# Patient Record
Sex: FEMALE | Race: OTHER | NOT HISPANIC OR LATINO | URBAN - METROPOLITAN AREA
[De-identification: names, ages, dates, MRNs, and addresses within clinical notes are randomized per-mention and may not be internally consistent; named-entity substitution may affect disease eponyms.]

---

## 2019-01-20 ENCOUNTER — INPATIENT (INPATIENT)
Facility: HOSPITAL | Age: 54
LOS: 9 days | Discharge: HOME | DRG: 216 | End: 2019-01-30
Attending: STUDENT IN AN ORGANIZED HEALTH CARE EDUCATION/TRAINING PROGRAM | Admitting: INTERNAL MEDICINE
Payer: MEDICAID

## 2019-01-20 VITALS
OXYGEN SATURATION: 99 % | WEIGHT: 179.9 LBS | SYSTOLIC BLOOD PRESSURE: 167 MMHG | RESPIRATION RATE: 20 BRPM | TEMPERATURE: 98 F | HEART RATE: 75 BPM | DIASTOLIC BLOOD PRESSURE: 97 MMHG

## 2019-01-20 DIAGNOSIS — I48.91 UNSPECIFIED ATRIAL FIBRILLATION: ICD-10-CM

## 2019-01-20 LAB
ALBUMIN SERPL ELPH-MCNC: 4.5 G/DL — SIGNIFICANT CHANGE UP (ref 3.3–5)
ALP SERPL-CCNC: 108 U/L — SIGNIFICANT CHANGE UP (ref 40–120)
ALT FLD-CCNC: 20 U/L — SIGNIFICANT CHANGE UP (ref 10–45)
ANION GAP SERPL CALC-SCNC: 13 MMOL/L — SIGNIFICANT CHANGE UP (ref 5–17)
AST SERPL-CCNC: 26 U/L — SIGNIFICANT CHANGE UP (ref 10–40)
BASOPHILS # BLD AUTO: 0.1 K/UL — SIGNIFICANT CHANGE UP (ref 0–0.2)
BASOPHILS NFR BLD AUTO: 0.8 % — SIGNIFICANT CHANGE UP (ref 0–2)
BILIRUB SERPL-MCNC: 0.8 MG/DL — SIGNIFICANT CHANGE UP (ref 0.2–1.2)
BUN SERPL-MCNC: 21 MG/DL — SIGNIFICANT CHANGE UP (ref 7–23)
CALCIUM SERPL-MCNC: 10.4 MG/DL — SIGNIFICANT CHANGE UP (ref 8.4–10.5)
CHLORIDE SERPL-SCNC: 100 MMOL/L — SIGNIFICANT CHANGE UP (ref 96–108)
CO2 SERPL-SCNC: 28 MMOL/L — SIGNIFICANT CHANGE UP (ref 22–31)
CREAT SERPL-MCNC: 0.87 MG/DL — SIGNIFICANT CHANGE UP (ref 0.5–1.3)
D DIMER BLD IA.RAPID-MCNC: <150 NG/ML DDU — SIGNIFICANT CHANGE UP
EOSINOPHIL # BLD AUTO: 0.3 K/UL — SIGNIFICANT CHANGE UP (ref 0–0.5)
EOSINOPHIL NFR BLD AUTO: 3.3 % — SIGNIFICANT CHANGE UP (ref 0–6)
GLUCOSE SERPL-MCNC: 124 MG/DL — HIGH (ref 70–99)
HCT VFR BLD CALC: 43.5 % — SIGNIFICANT CHANGE UP (ref 34.5–45)
HGB BLD-MCNC: 14.8 G/DL — SIGNIFICANT CHANGE UP (ref 11.5–15.5)
LYMPHOCYTES # BLD AUTO: 2.2 K/UL — SIGNIFICANT CHANGE UP (ref 1–3.3)
LYMPHOCYTES # BLD AUTO: 28.9 % — SIGNIFICANT CHANGE UP (ref 13–44)
MCHC RBC-ENTMCNC: 30.6 PG — SIGNIFICANT CHANGE UP (ref 27–34)
MCHC RBC-ENTMCNC: 33.9 GM/DL — SIGNIFICANT CHANGE UP (ref 32–36)
MCV RBC AUTO: 90.3 FL — SIGNIFICANT CHANGE UP (ref 80–100)
MONOCYTES # BLD AUTO: 0.6 K/UL — SIGNIFICANT CHANGE UP (ref 0–0.9)
MONOCYTES NFR BLD AUTO: 7.5 % — SIGNIFICANT CHANGE UP (ref 2–14)
NEUTROPHILS # BLD AUTO: 4.5 K/UL — SIGNIFICANT CHANGE UP (ref 1.8–7.4)
NEUTROPHILS NFR BLD AUTO: 59.4 % — SIGNIFICANT CHANGE UP (ref 43–77)
PLATELET # BLD AUTO: 266 K/UL — SIGNIFICANT CHANGE UP (ref 150–400)
POTASSIUM SERPL-MCNC: 3 MMOL/L — LOW (ref 3.5–5.3)
POTASSIUM SERPL-SCNC: 3 MMOL/L — LOW (ref 3.5–5.3)
PROT SERPL-MCNC: 8.6 G/DL — HIGH (ref 6–8.3)
RBC # BLD: 4.82 M/UL — SIGNIFICANT CHANGE UP (ref 3.8–5.2)
RBC # FLD: 13.8 % — SIGNIFICANT CHANGE UP (ref 10.3–14.5)
SODIUM SERPL-SCNC: 141 MMOL/L — SIGNIFICANT CHANGE UP (ref 135–145)
TROPONIN T, HIGH SENSITIVITY RESULT: 9 NG/L — SIGNIFICANT CHANGE UP (ref 0–51)
WBC # BLD: 7.6 K/UL — SIGNIFICANT CHANGE UP (ref 3.8–10.5)
WBC # FLD AUTO: 7.6 K/UL — SIGNIFICANT CHANGE UP (ref 3.8–10.5)

## 2019-01-20 PROCEDURE — 71046 X-RAY EXAM CHEST 2 VIEWS: CPT | Mod: 26

## 2019-01-20 PROCEDURE — 71250 CT THORAX DX C-: CPT | Mod: 26

## 2019-01-20 PROCEDURE — 93010 ELECTROCARDIOGRAM REPORT: CPT

## 2019-01-20 PROCEDURE — 99284 EMERGENCY DEPT VISIT MOD MDM: CPT | Mod: 25

## 2019-01-20 RX ORDER — CARVEDILOL PHOSPHATE 80 MG/1
3.12 CAPSULE, EXTENDED RELEASE ORAL EVERY 12 HOURS
Qty: 0 | Refills: 0 | Status: DISCONTINUED | OUTPATIENT
Start: 2019-01-20 | End: 2019-01-25

## 2019-01-20 RX ORDER — ISOSORBIDE MONONITRATE 60 MG/1
30 TABLET, EXTENDED RELEASE ORAL DAILY
Qty: 0 | Refills: 0 | Status: DISCONTINUED | OUTPATIENT
Start: 2019-01-20 | End: 2019-01-25

## 2019-01-20 RX ORDER — FAMOTIDINE 10 MG/ML
20 INJECTION INTRAVENOUS ONCE
Qty: 0 | Refills: 0 | Status: COMPLETED | OUTPATIENT
Start: 2019-01-20 | End: 2019-01-20

## 2019-01-20 RX ORDER — INFLUENZA VIRUS VACCINE 15; 15; 15; 15 UG/.5ML; UG/.5ML; UG/.5ML; UG/.5ML
0.5 SUSPENSION INTRAMUSCULAR ONCE
Qty: 0 | Refills: 0 | Status: COMPLETED | OUTPATIENT
Start: 2019-01-20 | End: 2019-01-20

## 2019-01-20 RX ORDER — POTASSIUM CHLORIDE 20 MEQ
40 PACKET (EA) ORAL ONCE
Qty: 0 | Refills: 0 | Status: COMPLETED | OUTPATIENT
Start: 2019-01-20 | End: 2019-01-20

## 2019-01-20 RX ORDER — ASPIRIN/CALCIUM CARB/MAGNESIUM 324 MG
324 TABLET ORAL ONCE
Qty: 0 | Refills: 0 | Status: COMPLETED | OUTPATIENT
Start: 2019-01-20 | End: 2019-01-20

## 2019-01-20 RX ORDER — LOSARTAN POTASSIUM 100 MG/1
25 TABLET, FILM COATED ORAL
Qty: 0 | Refills: 0 | Status: DISCONTINUED | OUTPATIENT
Start: 2019-01-20 | End: 2019-01-24

## 2019-01-20 RX ADMIN — ISOSORBIDE MONONITRATE 30 MILLIGRAM(S): 60 TABLET, EXTENDED RELEASE ORAL at 17:43

## 2019-01-20 RX ADMIN — Medication 30 MILLILITER(S): at 11:04

## 2019-01-20 RX ADMIN — Medication 324 MILLIGRAM(S): at 11:04

## 2019-01-20 RX ADMIN — Medication 0.5 MILLIGRAM(S): at 22:22

## 2019-01-20 RX ADMIN — LOSARTAN POTASSIUM 25 MILLIGRAM(S): 100 TABLET, FILM COATED ORAL at 17:43

## 2019-01-20 RX ADMIN — Medication 100 MILLIGRAM(S): at 20:23

## 2019-01-20 RX ADMIN — CARVEDILOL PHOSPHATE 3.12 MILLIGRAM(S): 80 CAPSULE, EXTENDED RELEASE ORAL at 22:22

## 2019-01-20 RX ADMIN — FAMOTIDINE 20 MILLIGRAM(S): 10 INJECTION INTRAVENOUS at 11:04

## 2019-01-20 RX ADMIN — Medication 40 MILLIEQUIVALENT(S): at 12:24

## 2019-01-20 NOTE — H&P ADULT - NSHPLABSRESULTS_GEN_ALL_CORE
LABS:                        14.8   7.6   )-----------( 266      ( 20 Jan 2019 11:12 )             43.5     01-20    141  |  100  |  21  ----------------------------<  124<H>  3.0<L>   |  28  |  0.87    Ca    10.4      20 Jan 2019 11:12    TPro  8.6<H>  /  Alb  4.5  /  TBili  0.8  /  DBili  x   /  AST  26  /  ALT  20  /  AlkPhos  108  01-20    PT/INR - ( 20 Jan 2019 11:12 )   PT: 14.8 sec;   INR: 1.28 ratio         PTT - ( 20 Jan 2019 11:12 )  PTT:37.6 sec          RADIOLOGY & ADDITIONAL TESTS:

## 2019-01-20 NOTE — ED ADULT NURSE NOTE - CHPI ED NUR SYMPTOMS NEG
no syncope/no vomiting/no diaphoresis/no chills/no fever/no nausea/no back pain/no congestion/no dizziness

## 2019-01-20 NOTE — ED PROVIDER NOTE - MEDICAL DECISION MAKING DETAILS
53 Guayanese femal, hx htn, afib not on ac p/w chest pain. Likely viral etiology but could be atypical ACS vs PE given risk factors. EKG notable for afib without ischemic changes. Will get ddimer/trop, labs, analgesia, cxr, reassess. Likely a/m to cdu for stress/cta coronary pending labs. 53 Bahamian female, hx htn, afib not on ac p/w chest pain. Likely viral etiology but could be atypical ACS vs PE given risk factors. EKG notable for afib without ischemic changes. Will get ddimer/trop, labs, analgesia, cxr, CTA if positive ddimers , reassess. Likely a/m to cdu for stress/cta coronary pending labs.ZR 53 Azerbaijani female, hx htn, afib not on ac p/w chest pain. Likely viral etiology but could be atypical ACS vs PE given risk factors. EKG notable for afib without ischemic changes. Will get ddimer/trop, labs, analgesia, cxr, CTA if positive ddimers , reassess. Likely a/m to cdu for stress/cta coronary pending lab ZR

## 2019-01-20 NOTE — ED ADULT NURSE REASSESSMENT NOTE - NS ED NURSE REASSESS COMMENT FT1
Pt received from SUDHAKAR Beckman in [facility]   PT Aox3 breathing even unlabored spontaneously, afib 80s on cardiac monitor, reporting 8/10 epigastric CP at this time. Pt received  at 1104. Will reassess pain. Family at bedside. Pt received from SUDHAKAR Beckman in [facility]   PT Aox3 breathing even unlabored spontaneously, afib 70s on cardiac monitor, reporting 8/10 epigastric CP at this time. Pt received  at 1104. Will reassess pain. Family at bedside.

## 2019-01-20 NOTE — H&P ADULT - ASSESSMENT
53F Ghanaian hx afib not on AC, htn p/w chest pain. Patient reports midsternal burning chest pain started a few days ago, constant, gradually worsening worse with cough/deep breath and endorses slight shortness of breath. Radiates to R shoulder. Non exertional/non positional. Denies leg swelling, but notes that she returned on flight from [facility] yesterday (6hr flight). Also notes recent fevers/cough began approximately 1-2 weeks ago. No history of clots, no recent procedures/hospitalizations. 53F Sudanese hx afib not on AC, htn p/w chest pain. Patient reports midsternal burning chest pain started a few days ago, constant, gradually worsening worse with cough/deep breath and endorses slight shortness of breath. Radiates to R shoulder. Non exertional/non positional. Denies leg swelling, but notes that she returned on flight from [facility] yesterday (6hr flight). Also notes recent fevers/cough began approximately 1-2 weeks ago. No history of clots, no recent procedures/hospitalizations.    chest pain  - admit to tele  - HS trop neg x 2  - 2 d echo  - cardio consult    liver cirrhosis on CT  - hepatology consult  - liver sono    Afib /  HTN  - cw coreg  - ? not on a/c

## 2019-01-20 NOTE — ED PROVIDER NOTE - OBJECTIVE STATEMENT
53F Kosovan hx afib not on AC, htn p/w chest pain. Patient reports midsternal burning chest pain started a few days ago, constant, gradually worsening worse with cough/deep breath and endorses slight shortness of breath. Radiates to R shoulder. Non exertional/non positional. Denies leg swelling, but notes that she returned on flight from [facility] yesterday (6hr flight). Also notes recent fevers/cough began approximatly 1-2 weeks ago. No history of clots, no recent procedures/hospitalizations. 53F Georgian hx afib not on AC, htn p/w chest pain. Patient reports midsternal burning chest pain started a few days ago, constant, gradually worsening worse with cough/deep breath and endorses slight shortness of breath. Radiates to R shoulder. Non exertional/non positional. Denies leg swelling, but notes that she returned on flight from [facility] yesterday (6hr flight). Also notes recent fevers/cough began approximately 1-2 weeks ago. No history of clots, no recent procedures/hospitalizations.

## 2019-01-20 NOTE — H&P ADULT - HISTORY OF PRESENT ILLNESS
53F American hx afib not on AC, htn p/w chest pain. Patient reports midsternal burning chest pain started a few days ago, constant, gradually worsening worse with cough/deep breath and endorses slight shortness of breath. Radiates to R shoulder. Non exertional/non positional. Denies leg swelling, but notes that she returned on flight from [facility] yesterday (6hr flight). Also notes recent fevers/cough began approximately 1-2 weeks ago. No history of clots, no recent procedures/hospitalizations.

## 2019-01-20 NOTE — ED PROVIDER NOTE - NEUROLOGICAL, MLM
Alert and oriented, no focal deficits, no motor or sensory deficits. cn2-12 grossly intact, normal speech and tone

## 2019-01-21 LAB
ANION GAP SERPL CALC-SCNC: 12 MMOL/L — SIGNIFICANT CHANGE UP (ref 5–17)
BUN SERPL-MCNC: 18 MG/DL — SIGNIFICANT CHANGE UP (ref 7–23)
CALCIUM SERPL-MCNC: 9.6 MG/DL — SIGNIFICANT CHANGE UP (ref 8.4–10.5)
CHLORIDE SERPL-SCNC: 105 MMOL/L — SIGNIFICANT CHANGE UP (ref 96–108)
CO2 SERPL-SCNC: 26 MMOL/L — SIGNIFICANT CHANGE UP (ref 22–31)
CREAT SERPL-MCNC: 0.71 MG/DL — SIGNIFICANT CHANGE UP (ref 0.5–1.3)
FOLATE SERPL-MCNC: 12.7 NG/ML — SIGNIFICANT CHANGE UP
GLUCOSE SERPL-MCNC: 100 MG/DL — HIGH (ref 70–99)
HCT VFR BLD CALC: 40.6 % — SIGNIFICANT CHANGE UP (ref 34.5–45)
HGB BLD-MCNC: 13.3 G/DL — SIGNIFICANT CHANGE UP (ref 11.5–15.5)
MAGNESIUM SERPL-MCNC: 2 MG/DL — SIGNIFICANT CHANGE UP (ref 1.6–2.6)
MCHC RBC-ENTMCNC: 29.4 PG — SIGNIFICANT CHANGE UP (ref 27–34)
MCHC RBC-ENTMCNC: 32.8 GM/DL — SIGNIFICANT CHANGE UP (ref 32–36)
MCV RBC AUTO: 89.8 FL — SIGNIFICANT CHANGE UP (ref 80–100)
PHOSPHATE SERPL-MCNC: 3.6 MG/DL — SIGNIFICANT CHANGE UP (ref 2.5–4.5)
PLATELET # BLD AUTO: 252 K/UL — SIGNIFICANT CHANGE UP (ref 150–400)
POTASSIUM SERPL-MCNC: 3.8 MMOL/L — SIGNIFICANT CHANGE UP (ref 3.5–5.3)
POTASSIUM SERPL-SCNC: 3.8 MMOL/L — SIGNIFICANT CHANGE UP (ref 3.5–5.3)
RBC # BLD: 4.52 M/UL — SIGNIFICANT CHANGE UP (ref 3.8–5.2)
RBC # FLD: 14.9 % — HIGH (ref 10.3–14.5)
SODIUM SERPL-SCNC: 143 MMOL/L — SIGNIFICANT CHANGE UP (ref 135–145)
TROPONIN T, HIGH SENSITIVITY RESULT: 8 NG/L — SIGNIFICANT CHANGE UP (ref 0–51)
TSH SERPL-MCNC: 2.85 UIU/ML — SIGNIFICANT CHANGE UP (ref 0.27–4.2)
VIT B12 SERPL-MCNC: 312 PG/ML — SIGNIFICANT CHANGE UP (ref 232–1245)
WBC # BLD: 6.7 K/UL — SIGNIFICANT CHANGE UP (ref 3.8–10.5)
WBC # FLD AUTO: 6.7 K/UL — SIGNIFICANT CHANGE UP (ref 3.8–10.5)

## 2019-01-21 PROCEDURE — 99255 IP/OBS CONSLTJ NEW/EST HI 80: CPT | Mod: GC

## 2019-01-21 RX ORDER — HEPARIN SODIUM 5000 [USP'U]/ML
6500 INJECTION INTRAVENOUS; SUBCUTANEOUS EVERY 6 HOURS
Qty: 0 | Refills: 0 | Status: DISCONTINUED | OUTPATIENT
Start: 2019-01-21 | End: 2019-01-25

## 2019-01-21 RX ORDER — HEPARIN SODIUM 5000 [USP'U]/ML
INJECTION INTRAVENOUS; SUBCUTANEOUS
Qty: 25000 | Refills: 0 | Status: DISCONTINUED | OUTPATIENT
Start: 2019-01-21 | End: 2019-01-23

## 2019-01-21 RX ORDER — PREGABALIN 225 MG/1
1000 CAPSULE ORAL DAILY
Qty: 0 | Refills: 0 | Status: COMPLETED | OUTPATIENT
Start: 2019-01-21 | End: 2019-01-23

## 2019-01-21 RX ORDER — HEPARIN SODIUM 5000 [USP'U]/ML
3000 INJECTION INTRAVENOUS; SUBCUTANEOUS EVERY 6 HOURS
Qty: 0 | Refills: 0 | Status: DISCONTINUED | OUTPATIENT
Start: 2019-01-21 | End: 2019-01-25

## 2019-01-21 RX ORDER — HEPARIN SODIUM 5000 [USP'U]/ML
6500 INJECTION INTRAVENOUS; SUBCUTANEOUS ONCE
Qty: 0 | Refills: 0 | Status: COMPLETED | OUTPATIENT
Start: 2019-01-21 | End: 2019-01-21

## 2019-01-21 RX ADMIN — Medication 0.5 MILLIGRAM(S): at 22:22

## 2019-01-21 RX ADMIN — HEPARIN SODIUM 1500 UNIT(S)/HR: 5000 INJECTION INTRAVENOUS; SUBCUTANEOUS at 18:28

## 2019-01-21 RX ADMIN — LOSARTAN POTASSIUM 25 MILLIGRAM(S): 100 TABLET, FILM COATED ORAL at 16:36

## 2019-01-21 RX ADMIN — CARVEDILOL PHOSPHATE 3.12 MILLIGRAM(S): 80 CAPSULE, EXTENDED RELEASE ORAL at 06:33

## 2019-01-21 RX ADMIN — LOSARTAN POTASSIUM 25 MILLIGRAM(S): 100 TABLET, FILM COATED ORAL at 06:33

## 2019-01-21 RX ADMIN — PREGABALIN 1000 MICROGRAM(S): 225 CAPSULE ORAL at 16:36

## 2019-01-21 RX ADMIN — ISOSORBIDE MONONITRATE 30 MILLIGRAM(S): 60 TABLET, EXTENDED RELEASE ORAL at 14:10

## 2019-01-21 RX ADMIN — CARVEDILOL PHOSPHATE 3.12 MILLIGRAM(S): 80 CAPSULE, EXTENDED RELEASE ORAL at 16:35

## 2019-01-21 RX ADMIN — HEPARIN SODIUM 6500 UNIT(S): 5000 INJECTION INTRAVENOUS; SUBCUTANEOUS at 18:29

## 2019-01-21 NOTE — CONSULT NOTE ADULT - SUBJECTIVE AND OBJECTIVE BOX
Patient seen and evaluated at bedside    Chief Complaint: chest pain    HPI:  53 Burkinan female with Afib, HTN presents with chest pain. Patient reports midsternal burning chest pain started a few days ago. She says it is worse with coughing and exertion. It lasts for approx 30 mins and then goes away. She says that she has had CP for the last year but worsened a few weeks ago after a URI. She sees a cardiologist in [facility] and was going to undergo further testing but then she had this travel scheduled so decided to go through with it. No on AC for Afib because she was having hemoptysis when she was discovered to have AF, which was a few weeks ago.      PMH:   HTN (hypertension)  Afib      PSH:   No significant past surgical history      Medications:   benzonatate 100 milliGRAM(s) Oral three times a day PRN  carvedilol 3.125 milliGRAM(s) Oral every 12 hours  cyanocobalamin Injectable 1000 MICROGram(s) IntraMuscular daily  influenza   Vaccine 0.5 milliLiter(s) IntraMuscular once  isosorbide   mononitrate ER Tablet (IMDUR) 30 milliGRAM(s) Oral daily  LORazepam     Tablet 0.5 milliGRAM(s) Oral at bedtime PRN  losartan 25 milliGRAM(s) Oral two times a day      Allergies:  No Known Allergies      FAMILY HISTORY:  No pertinent family history in first degree relatives      Social History:  Smoking History: denies  Alcohol Use: denies  Drug Use: denies    Review of Systems:  REVIEW OF SYSTEMS:  CONSTITUTIONAL: No weakness, fevers or chills  EYES/ENT: No visual changes;  No dysphagia  NECK: No pain or stiffness  RESPIRATORY: No cough, wheezing, hemoptysis; +shortness of breath  CARDIOVASCULAR: +chest pain; No lower extremity edema  GASTROINTESTINAL: No abdominal or epigastric pain. No nausea, vomiting, or hematemesis; No diarrhea or constipation. No melena or hematochezia.  BACK: No back pain  GENITOURINARY: No dysuria, frequency or hematuria  NEUROLOGICAL: No numbness or weakness  SKIN: No itching, burning, rashes, or lesions   All other review of systems is negative unless indicated above.    Physical Exam:  T(F): 98.1 (01-21), Max: 98.2 (01-20)  HR: 67 (01-21) (67 - 83)  BP: 133/90 (01-21) (129/79 - 147/83)  RR: 20 (01-21)  SpO2: 97% (01-21)  GENERAL: No acute distress, well-developed  HEAD:  Atraumatic, Normocephalic  ENT: EOMI, PERRLA, conjunctiva and sclera clear, Neck supple, No JVD, moist mucosa  CHEST/LUNG: Clear to auscultation bilaterally; No wheeze, equal breath sounds bilaterally   BACK: No spinal tenderness  HEART: Regular rate and rhythm; No murmurs, rubs, or gallops  ABDOMEN: Soft, Nontender, Nondistended; Bowel sounds present  EXTREMITIES:  No clubbing, cyanosis, or edema  PSYCH: Nl behavior, nl affect  NEUROLOGY: AAOx3, non-focal, cranial nerves intact  SKIN: Normal color, No rashes or lesions    Cardiovascular Diagnostic Testing:    ECG: Personally reviewed  AF, HR 62, Q waves V1, V2, AVL    Labs: Personally reviewed                        13.3   6.70  )-----------( 252      ( 21 Jan 2019 08:39 )             40.6     01-21    143  |  105  |  18  ----------------------------<  100<H>  3.8   |  26  |  0.71    Ca    9.6      21 Jan 2019 07:18  Phos  3.6     01-21  Mg     2.0     01-21    TPro  8.6<H>  /  Alb  4.5  /  TBili  0.8  /  DBili  x   /  AST  26  /  ALT  20  /  AlkPhos  108  01-20    PT/INR - ( 20 Jan 2019 11:12 )   PT: 14.8 sec;   INR: 1.28 ratio         PTT - ( 20 Jan 2019 11:12 )  PTT:37.6 sec            Thyroid Stimulating Hormone, Serum: 2.85 uIU/mL (01-21 @ 08:39) Patient seen and evaluated at bedside    Chief Complaint: chest pain    HPI:  53 Australian female with Afib, HTN presents with chest pain. Patient reports midsternal burning chest pain started a few days ago. She says it is worse with coughing and exertion. It lasts for approx 30 mins and then goes away. She says that she has had CP for the last year but worsened a few weeks ago after a URI. She sees a cardiologist in [facility] and was going to undergo further testing but then she had this travel scheduled so decided to go through with it. No on AC for Afib because she was having hemoptysis when she was discovered to have AF, which was a few weeks ago.      PMH:   HTN (hypertension)  Afib      PSH:   No significant past surgical history      Medications:   benzonatate 100 milliGRAM(s) Oral three times a day PRN  carvedilol 3.125 milliGRAM(s) Oral every 12 hours  cyanocobalamin Injectable 1000 MICROGram(s) IntraMuscular daily  influenza   Vaccine 0.5 milliLiter(s) IntraMuscular once  isosorbide   mononitrate ER Tablet (IMDUR) 30 milliGRAM(s) Oral daily  LORazepam     Tablet 0.5 milliGRAM(s) Oral at bedtime PRN  losartan 25 milliGRAM(s) Oral two times a day      Allergies:  No Known Allergies      FAMILY HISTORY:  No pertinent family history in first degree relatives      Social History:  Smoking History: denies  Alcohol Use: denies  Drug Use: denies    Review of Systems:  REVIEW OF SYSTEMS:  CONSTITUTIONAL: No weakness, fevers or chills  EYES/ENT: No visual changes;  No dysphagia  NECK: No pain or stiffness  RESPIRATORY: No cough, wheezing, hemoptysis; +shortness of breath  CARDIOVASCULAR: +chest pain; No lower extremity edema  GASTROINTESTINAL: No abdominal or epigastric pain. No nausea, vomiting, or hematemesis; No diarrhea or constipation. No melena or hematochezia.  BACK: No back pain  GENITOURINARY: No dysuria, frequency or hematuria  NEUROLOGICAL: No numbness or weakness  SKIN: No itching, burning, rashes, or lesions   All other review of systems is negative unless indicated above.    Physical Exam:  T(F): 98.1 (01-21), Max: 98.2 (01-20)  HR: 67 (01-21) (67 - 83)  BP: 133/90 (01-21) (129/79 - 147/83)  RR: 20 (01-21)  SpO2: 97% (01-21)  GENERAL: No acute distress, well-developed  HEAD:  Atraumatic, Normocephalic  ENT: EOMI, PERRLA, conjunctiva and sclera clear, Neck supple, No JVD, moist mucosa  CHEST/LUNG: Clear to auscultation bilaterally; No wheeze, equal breath sounds bilaterally   BACK: No spinal tenderness  HEART: Irregular rhythm; No murmurs, rubs, or gallops  ABDOMEN: Soft, Nontender, Nondistended; Bowel sounds present  EXTREMITIES:  No clubbing, cyanosis, or edema  PSYCH: Nl behavior, nl affect  NEUROLOGY: AAOx3, non-focal, cranial nerves intact  SKIN: Normal color, No rashes or lesions    Cardiovascular Diagnostic Testing:    ECG: Personally reviewed  AF, HR 62, Q waves V1, V2, AVL    Labs: Personally reviewed                        13.3   6.70  )-----------( 252      ( 21 Jan 2019 08:39 )             40.6     01-21    143  |  105  |  18  ----------------------------<  100<H>  3.8   |  26  |  0.71    Ca    9.6      21 Jan 2019 07:18  Phos  3.6     01-21  Mg     2.0     01-21    TPro  8.6<H>  /  Alb  4.5  /  TBili  0.8  /  DBili  x   /  AST  26  /  ALT  20  /  AlkPhos  108  01-20    PT/INR - ( 20 Jan 2019 11:12 )   PT: 14.8 sec;   INR: 1.28 ratio         PTT - ( 20 Jan 2019 11:12 )  PTT:37.6 sec            Thyroid Stimulating Hormone, Serum: 2.85 uIU/mL (01-21 @ 08:39) Patient seen and evaluated at bedside    Chief Complaint: chest pain    HPI:  53 Fijian female with Afib, HTN presents with chest pain. Patient reports midsternal burning chest pain started a few days ago. She says it is worse with coughing and exertion. It lasts for approx 30 mins and then goes away. She says that she has had CP for the last year but worsened a few weeks ago after a URI. She sees a cardiologist in [facility] and was going to undergo further testing but then she had this travel scheduled so decided to go through with it. No on AC for Afib because she was having hemoptysis when she was discovered to have AF, which was a few weeks ago.      PMH:   HTN (hypertension)  Afib      PSH:   No significant past surgical history      Medications:   benzonatate 100 milliGRAM(s) Oral three times a day PRN  carvedilol 3.125 milliGRAM(s) Oral every 12 hours  cyanocobalamin Injectable 1000 MICROGram(s) IntraMuscular daily  influenza   Vaccine 0.5 milliLiter(s) IntraMuscular once  isosorbide   mononitrate ER Tablet (IMDUR) 30 milliGRAM(s) Oral daily  LORazepam     Tablet 0.5 milliGRAM(s) Oral at bedtime PRN  losartan 25 milliGRAM(s) Oral two times a day      Allergies:  No Known Allergies      FAMILY HISTORY:  Father - [place]     Social History:  Smoking History: denies  Alcohol Use: denies  Drug Use: denies    Review of Systems:  REVIEW OF SYSTEMS:  CONSTITUTIONAL: No weakness, fevers or chills  EYES/ENT: No visual changes;  No dysphagia  NECK: No pain or stiffness  RESPIRATORY: No cough, wheezing, hemoptysis; +shortness of breath  CARDIOVASCULAR: +chest pain; No lower extremity edema  GASTROINTESTINAL: No abdominal or epigastric pain. No nausea, vomiting, or hematemesis; No diarrhea or constipation. No melena or hematochezia.  BACK: No back pain  GENITOURINARY: No dysuria, frequency or hematuria  NEUROLOGICAL: No numbness or weakness  SKIN: No itching, burning, rashes, or lesions   All other review of systems is negative unless indicated above.    Physical Exam:  T(F): 98.1 (01-21), Max: 98.2 (01-20)  HR: 67 (01-21) (67 - 83)  BP: 133/90 (01-21) (129/79 - 147/83)  RR: 20 (01-21)  SpO2: 97% (01-21)  GENERAL: No acute distress, well-developed  HEAD:  Atraumatic, Normocephalic  ENT: EOMI, PERRLA, conjunctiva and sclera clear, Neck supple, No JVD, moist mucosa  CHEST/LUNG: Clear to auscultation bilaterally; No wheeze, equal breath sounds bilaterally   BACK: No spinal tenderness  HEART: Irregular rhythm; No murmurs, rubs, or gallops  ABDOMEN: Soft, Nontender, Nondistended; Bowel sounds present  EXTREMITIES:  No clubbing, cyanosis, or edema  PSYCH: Nl behavior, nl affect  NEUROLOGY: AAOx3, non-focal, cranial nerves intact  SKIN: Normal color, No rashes or lesions    Cardiovascular Diagnostic Testing:    ECG: Personally reviewed  AF, HR 62, Q waves V1, V2, AVL    Labs: Personally reviewed                        13.3   6.70  )-----------( 252      ( 21 Jan 2019 08:39 )             40.6     01-21    143  |  105  |  18  ----------------------------<  100<H>  3.8   |  26  |  0.71    Ca    9.6      21 Jan 2019 07:18  Phos  3.6     01-21  Mg     2.0     01-21    TPro  8.6<H>  /  Alb  4.5  /  TBili  0.8  /  DBili  x   /  AST  26  /  ALT  20  /  AlkPhos  108  01-20    PT/INR - ( 20 Jan 2019 11:12 )   PT: 14.8 sec;   INR: 1.28 ratio         PTT - ( 20 Jan 2019 11:12 )  PTT:37.6 sec            Thyroid Stimulating Hormone, Serum: 2.85 uIU/mL (01-21 @ 08:39)

## 2019-01-21 NOTE — CONSULT NOTE ADULT - ASSESSMENT
53 Cuban female with Afib, HTN presents with chest pain.    Chest pain. Concern for unstable angina but also has some atypical components (worsened after URI, lasts for 30 mins at a time).   - pharmacologic NST tomorrow  - TTE  - cont Coreg and ARB    AF. CHADS-VASc 2.  - start heparin drip as she denies current hemoptysis  - cont coreg    H/o hemoptysis/pulmonary nodules. Unclear how much this is contributing to SOB/CP.  - W/u per medicine

## 2019-01-21 NOTE — CHART NOTE - NSCHARTNOTEFT_GEN_A_CORE
Vital signs and labs reviewed,  All current documents reviewed.  Cardiology contacted re: recommendation to start Heparin gtt - this was confirmed with [facility] ([id number]) - to start full anticoagulation with IV Heparin at this time, anticipate pharm. stress test in AM.  Dr Lujan contacted to confirm above.   OK to start full anticoagulation with Heparin drip - will monitor for signs/symptoms of bleeding - history reports episode of hemoptysis in past.

## 2019-01-21 NOTE — PROGRESS NOTE ADULT - ASSESSMENT
53F Georgian hx afib not on AC, htn p/w chest pain. Patient reports midsternal burning chest pain started a few days ago, constant, gradually worsening worse with cough/deep breath and endorses slight shortness of breath. Radiates to R shoulder. Non exertional/non positional. Denies leg swelling, but notes that she returned on flight from [facility] yesterday (6hr flight). Also notes recent fevers/cough began approximately 1-2 weeks ago. No history of clots, no recent procedures/hospitalizations.    chest pain  - admit to tele  - HS trop neg x 2  - 2 d echo  - cardio consult    liver cirrhosis on CT  - hepatology consult  - liver sono    Afib /  HTN  - cw coreg  - ? not on a/c

## 2019-01-21 NOTE — PROGRESS NOTE ADULT - SUBJECTIVE AND OBJECTIVE BOX
Patient is a 53y old  Female who presents with a chief complaint of chest pain (20 Jan 2019 17:19)      SUBJECTIVE / OVERNIGHT EVENTS:  No chest pain. No shortness of breath. No complaints. No events overnight.     MEDICATIONS  (STANDING):  carvedilol 3.125 milliGRAM(s) Oral every 12 hours  influenza   Vaccine 0.5 milliLiter(s) IntraMuscular once  isosorbide   mononitrate ER Tablet (IMDUR) 30 milliGRAM(s) Oral daily  losartan 25 milliGRAM(s) Oral two times a day    MEDICATIONS  (PRN):  benzonatate 100 milliGRAM(s) Oral three times a day PRN Cough  LORazepam     Tablet 0.5 milliGRAM(s) Oral at bedtime PRN insomnia      Vital Signs Last 24 Hrs  T(C): 36.5 (21 Jan 2019 07:58), Max: 36.8 (20 Jan 2019 14:44)  T(F): 97.7 (21 Jan 2019 07:58), Max: 98.2 (20 Jan 2019 14:44)  HR: 83 (21 Jan 2019 07:58) (74 - 83)  BP: 147/83 (21 Jan 2019 07:58) (129/79 - 170/91)  BP(mean): --  RR: 18 (21 Jan 2019 07:58) (18 - 21)  SpO2: 96% (21 Jan 2019 07:58) (96% - 100%)  CAPILLARY BLOOD GLUCOSE        I&O's Summary      PHYSICAL EXAM:  GENERAL: NAD, well-developed  HEAD:  Atraumatic, Normocephalic  EYES: EOMI, PERRLA, conjunctiva and sclera clear  NECK: Supple, No JVD  CHEST/LUNG: Clear to auscultation bilaterally; No wheeze  HEART: Regular rate and rhythm; No murmurs, rubs, or gallops  ABDOMEN: Soft, Nontender, Nondistended; Bowel sounds present  EXTREMITIES:  2+ Peripheral Pulses, No clubbing, cyanosis, or edema  PSYCH: AAOx3  NEUROLOGY: non-focal  SKIN: No rashes or lesions    LABS:                        13.3   6.70  )-----------( 252      ( 21 Jan 2019 08:39 )             40.6     01-21    143  |  105  |  18  ----------------------------<  100<H>  3.8   |  26  |  0.71    Ca    9.6      21 Jan 2019 07:18  Phos  3.6     01-21  Mg     2.0     01-21    TPro  8.6<H>  /  Alb  4.5  /  TBili  0.8  /  DBili  x   /  AST  26  /  ALT  20  /  AlkPhos  108  01-20    PT/INR - ( 20 Jan 2019 11:12 )   PT: 14.8 sec;   INR: 1.28 ratio         PTT - ( 20 Jan 2019 11:12 )  PTT:37.6 sec          RADIOLOGY & ADDITIONAL TESTS:    Imaging Personally Reviewed:    Consultant(s) Notes Reviewed:      Care Discussed with Consultants/Other Providers:

## 2019-01-22 LAB
ANION GAP SERPL CALC-SCNC: 15 MMOL/L — SIGNIFICANT CHANGE UP (ref 5–17)
APTT BLD: 139.3 SEC — CRITICAL HIGH (ref 27.5–36.3)
APTT BLD: 80.8 SEC — HIGH (ref 27.5–36.3)
APTT BLD: >200 SEC — CRITICAL HIGH (ref 27.5–36.3)
BUN SERPL-MCNC: 14 MG/DL — SIGNIFICANT CHANGE UP (ref 7–23)
CALCIUM SERPL-MCNC: 9.8 MG/DL — SIGNIFICANT CHANGE UP (ref 8.4–10.5)
CHLORIDE SERPL-SCNC: 106 MMOL/L — SIGNIFICANT CHANGE UP (ref 96–108)
CO2 SERPL-SCNC: 22 MMOL/L — SIGNIFICANT CHANGE UP (ref 22–31)
CREAT SERPL-MCNC: 0.68 MG/DL — SIGNIFICANT CHANGE UP (ref 0.5–1.3)
GLUCOSE SERPL-MCNC: 103 MG/DL — HIGH (ref 70–99)
HCT VFR BLD CALC: 37.3 % — SIGNIFICANT CHANGE UP (ref 34.5–45)
HCT VFR BLD CALC: 41.2 % — SIGNIFICANT CHANGE UP (ref 34.5–45)
HGB BLD-MCNC: 13 G/DL — SIGNIFICANT CHANGE UP (ref 11.5–15.5)
HGB BLD-MCNC: 13.9 G/DL — SIGNIFICANT CHANGE UP (ref 11.5–15.5)
INR BLD: 1.31 RATIO — HIGH (ref 0.88–1.16)
MAGNESIUM SERPL-MCNC: 1.9 MG/DL — SIGNIFICANT CHANGE UP (ref 1.6–2.6)
MCHC RBC-ENTMCNC: 30.2 PG — SIGNIFICANT CHANGE UP (ref 27–34)
MCHC RBC-ENTMCNC: 31 PG — SIGNIFICANT CHANGE UP (ref 27–34)
MCHC RBC-ENTMCNC: 33.8 GM/DL — SIGNIFICANT CHANGE UP (ref 32–36)
MCHC RBC-ENTMCNC: 34.8 GM/DL — SIGNIFICANT CHANGE UP (ref 32–36)
MCV RBC AUTO: 89.3 FL — SIGNIFICANT CHANGE UP (ref 80–100)
MCV RBC AUTO: 89.5 FL — SIGNIFICANT CHANGE UP (ref 80–100)
PLATELET # BLD AUTO: 215 K/UL — SIGNIFICANT CHANGE UP (ref 150–400)
PLATELET # BLD AUTO: 231 K/UL — SIGNIFICANT CHANGE UP (ref 150–400)
POTASSIUM SERPL-MCNC: 3.9 MMOL/L — SIGNIFICANT CHANGE UP (ref 3.5–5.3)
POTASSIUM SERPL-SCNC: 3.9 MMOL/L — SIGNIFICANT CHANGE UP (ref 3.5–5.3)
PROTHROM AB SERPL-ACNC: 15.2 SEC — HIGH (ref 10–12.9)
RBC # BLD: 4.18 M/UL — SIGNIFICANT CHANGE UP (ref 3.8–5.2)
RBC # BLD: 4.6 M/UL — SIGNIFICANT CHANGE UP (ref 3.8–5.2)
RBC # FLD: 13.2 % — SIGNIFICANT CHANGE UP (ref 10.3–14.5)
RBC # FLD: 13.2 % — SIGNIFICANT CHANGE UP (ref 10.3–14.5)
SODIUM SERPL-SCNC: 143 MMOL/L — SIGNIFICANT CHANGE UP (ref 135–145)
WBC # BLD: 6.8 K/UL — SIGNIFICANT CHANGE UP (ref 3.8–10.5)
WBC # BLD: 7.6 K/UL — SIGNIFICANT CHANGE UP (ref 3.8–10.5)
WBC # FLD AUTO: 6.8 K/UL — SIGNIFICANT CHANGE UP (ref 3.8–10.5)
WBC # FLD AUTO: 7.6 K/UL — SIGNIFICANT CHANGE UP (ref 3.8–10.5)

## 2019-01-22 PROCEDURE — 93460 R&L HRT ART/VENTRICLE ANGIO: CPT | Mod: 26,GC

## 2019-01-22 PROCEDURE — 99233 SBSQ HOSP IP/OBS HIGH 50: CPT | Mod: GC

## 2019-01-22 PROCEDURE — 99152 MOD SED SAME PHYS/QHP 5/>YRS: CPT | Mod: GC

## 2019-01-22 PROCEDURE — 93306 TTE W/DOPPLER COMPLETE: CPT | Mod: 26

## 2019-01-22 RX ORDER — ATORVASTATIN CALCIUM 80 MG/1
80 TABLET, FILM COATED ORAL AT BEDTIME
Qty: 0 | Refills: 0 | Status: DISCONTINUED | OUTPATIENT
Start: 2019-01-22 | End: 2019-01-24

## 2019-01-22 RX ADMIN — LOSARTAN POTASSIUM 25 MILLIGRAM(S): 100 TABLET, FILM COATED ORAL at 17:27

## 2019-01-22 RX ADMIN — HEPARIN SODIUM 0 UNIT(S)/HR: 5000 INJECTION INTRAVENOUS; SUBCUTANEOUS at 10:34

## 2019-01-22 RX ADMIN — HEPARIN SODIUM 900 UNIT(S)/HR: 5000 INJECTION INTRAVENOUS; SUBCUTANEOUS at 11:46

## 2019-01-22 RX ADMIN — HEPARIN SODIUM 900 UNIT(S)/HR: 5000 INJECTION INTRAVENOUS; SUBCUTANEOUS at 18:29

## 2019-01-22 RX ADMIN — CARVEDILOL PHOSPHATE 3.12 MILLIGRAM(S): 80 CAPSULE, EXTENDED RELEASE ORAL at 05:29

## 2019-01-22 RX ADMIN — ISOSORBIDE MONONITRATE 30 MILLIGRAM(S): 60 TABLET, EXTENDED RELEASE ORAL at 11:53

## 2019-01-22 RX ADMIN — CARVEDILOL PHOSPHATE 3.12 MILLIGRAM(S): 80 CAPSULE, EXTENDED RELEASE ORAL at 17:27

## 2019-01-22 RX ADMIN — PREGABALIN 1000 MICROGRAM(S): 225 CAPSULE ORAL at 11:53

## 2019-01-22 RX ADMIN — HEPARIN SODIUM 1200 UNIT(S)/HR: 5000 INJECTION INTRAVENOUS; SUBCUTANEOUS at 02:20

## 2019-01-22 RX ADMIN — LOSARTAN POTASSIUM 25 MILLIGRAM(S): 100 TABLET, FILM COATED ORAL at 05:29

## 2019-01-22 RX ADMIN — ATORVASTATIN CALCIUM 80 MILLIGRAM(S): 80 TABLET, FILM COATED ORAL at 23:05

## 2019-01-22 RX ADMIN — Medication 0.5 MILLIGRAM(S): at 23:05

## 2019-01-22 RX ADMIN — HEPARIN SODIUM 0 UNIT(S)/HR: 5000 INJECTION INTRAVENOUS; SUBCUTANEOUS at 01:16

## 2019-01-22 NOTE — PROGRESS NOTE ADULT - SUBJECTIVE AND OBJECTIVE BOX
Patient is a 53y old  Female who presents with a chief complaint of chest pain (21 Jan 2019 16:31)      SUBJECTIVE / OVERNIGHT EVENTS:  had chest pain this morning    MEDICATIONS  (STANDING):  atorvastatin 80 milliGRAM(s) Oral at bedtime  carvedilol 3.125 milliGRAM(s) Oral every 12 hours  cyanocobalamin Injectable 1000 MICROGram(s) IntraMuscular daily  heparin  Infusion.  Unit(s)/Hr (15 mL/Hr) IV Continuous <Continuous>  influenza   Vaccine 0.5 milliLiter(s) IntraMuscular once  isosorbide   mononitrate ER Tablet (IMDUR) 30 milliGRAM(s) Oral daily  losartan 25 milliGRAM(s) Oral two times a day    MEDICATIONS  (PRN):  benzonatate 100 milliGRAM(s) Oral three times a day PRN Cough  heparin  Injectable 6500 Unit(s) IV Push every 6 hours PRN For aPTT less than 40  heparin  Injectable 3000 Unit(s) IV Push every 6 hours PRN For aPTT between 40 - 57  LORazepam     Tablet 0.5 milliGRAM(s) Oral at bedtime PRN insomnia      Vital Signs Last 24 Hrs  T(C): 36.9 (22 Jan 2019 04:28), Max: 36.9 (22 Jan 2019 04:28)  T(F): 98.4 (22 Jan 2019 04:28), Max: 98.4 (22 Jan 2019 04:28)  HR: 73 (22 Jan 2019 11:56) (67 - 77)  BP: 133/86 (22 Jan 2019 11:56) (126/78 - 144/97)  BP(mean): --  RR: 18 (22 Jan 2019 11:56) (18 - 20)  SpO2: 99% (22 Jan 2019 11:56) (97% - 99%)  CAPILLARY BLOOD GLUCOSE        I&O's Summary    21 Jan 2019 07:01  -  22 Jan 2019 07:00  --------------------------------------------------------  IN: 30 mL / OUT: 0 mL / NET: 30 mL    22 Jan 2019 07:01  -  22 Jan 2019 14:49  --------------------------------------------------------  IN: 45 mL / OUT: 0 mL / NET: 45 mL        PHYSICAL EXAM:  GENERAL: NAD, well-developed  HEAD:  Atraumatic, Normocephalic  EYES: EOMI, PERRLA, conjunctiva and sclera clear  NECK: Supple, No JVD  CHEST/LUNG: Clear to auscultation bilaterally; No wheeze  HEART: Regular rate and rhythm; No murmurs, rubs, or gallops  ABDOMEN: Soft, Nontender, Nondistended; Bowel sounds present  EXTREMITIES:  2+ Peripheral Pulses, No clubbing, cyanosis, or edema  PSYCH: AAOx3  NEUROLOGY: non-focal  SKIN: No rashes or lesions    LABS:                        13.9   6.8   )-----------( 231      ( 22 Jan 2019 09:55 )             41.2     01-22    143  |  106  |  14  ----------------------------<  103<H>  3.9   |  22  |  0.68    Ca    9.8      22 Jan 2019 09:56  Phos  3.6     01-21  Mg     1.9     01-22      PT/INR - ( 22 Jan 2019 09:56 )   PT: 15.2 sec;   INR: 1.31 ratio         PTT - ( 22 Jan 2019 09:56 )  PTT:139.3 sec          RADIOLOGY & ADDITIONAL TESTS:    Imaging Personally Reviewed:    Consultant(s) Notes Reviewed:      Care Discussed with Consultants/Other Providers: Patient is a 53y old  Female who presents with a chief complaint of chest pain (21 Jan 2019 16:31)      SUBJECTIVE / OVERNIGHT EVENTS:  had chest pain this morning    MEDICATIONS  (STANDING):  atorvastatin 80 milliGRAM(s) Oral at bedtime  carvedilol 3.125 milliGRAM(s) Oral every 12 hours  cyanocobalamin Injectable 1000 MICROGram(s) IntraMuscular daily  heparin  Infusion.  Unit(s)/Hr (15 mL/Hr) IV Continuous <Continuous>  influenza   Vaccine 0.5 milliLiter(s) IntraMuscular once  isosorbide   mononitrate ER Tablet (IMDUR) 30 milliGRAM(s) Oral daily  losartan 25 milliGRAM(s) Oral two times a day    MEDICATIONS  (PRN):  benzonatate 100 milliGRAM(s) Oral three times a day PRN Cough  heparin  Injectable 6500 Unit(s) IV Push every 6 hours PRN For aPTT less than 40  heparin  Injectable 3000 Unit(s) IV Push every 6 hours PRN For aPTT between 40 - 57  LORazepam     Tablet 0.5 milliGRAM(s) Oral at bedtime PRN insomnia      Vital Signs Last 24 Hrs  T(C): 36.9 (22 Jan 2019 04:28), Max: 36.9 (22 Jan 2019 04:28)  T(F): 98.4 (22 Jan 2019 04:28), Max: 98.4 (22 Jan 2019 04:28)  HR: 73 (22 Jan 2019 11:56) (67 - 77)  BP: 133/86 (22 Jan 2019 11:56) (126/78 - 144/97)  BP(mean): --  RR: 18 (22 Jan 2019 11:56) (18 - 20)  SpO2: 99% (22 Jan 2019 11:56) (97% - 99%)  CAPILLARY BLOOD GLUCOSE        I&O's Summary    21 Jan 2019 07:01  -  22 Jan 2019 07:00  --------------------------------------------------------  IN: 30 mL / OUT: 0 mL / NET: 30 mL    22 Jan 2019 07:01  -  22 Jan 2019 14:49  --------------------------------------------------------  IN: 45 mL / OUT: 0 mL / NET: 45 mL        PHYSICAL EXAM:  GENERAL: NAD, well-developed  HEAD:  Atraumatic, Normocephalic  EYES: EOMI, PERRLA, conjunctiva and sclera clear  NECK: Supple, No JVD  CHEST/LUNG: Clear to auscultation bilaterally; No wheeze  HEART: Regular rate and rhythm; No murmurs, rubs, or gallops  ABDOMEN: Soft, Nontender, Nondistended; Bowel sounds present  EXTREMITIES:  2+ Peripheral Pulses, No clubbing, cyanosis, or edema  PSYCH: AAOx3  NEUROLOGY: non-focal  SKIN: No rashes or lesions    LABS:                        13.9   6.8   )-----------( 231      ( 22 Jan 2019 09:55 )             41.2     01-22    143  |  106  |  14  ----------------------------<  103<H>  3.9   |  22  |  0.68    Ca    9.8      22 Jan 2019 09:56  Phos  3.6     01-21  Mg     1.9     01-22      PT/INR - ( 22 Jan 2019 09:56 )   PT: 15.2 sec;   INR: 1.31 ratio         PTT - ( 22 Jan 2019 09:56 )  PTT:139.3 sec          RADIOLOGY & ADDITIONAL TESTS:    Imaging Personally Reviewed:    < from: Transthoracic Echocardiogram (01.22.19 @ 07:32) >  Conclusions:  1. Doming and thickening of the tips of the mitral valve  consistent with rheumatic mitral valve disease. Severe  mitral regurgitation. Mean transmitral valve gradient  equals 8 mm Hg, consistent with moderate mitral stenosis.  2. Severely dilated left atrium.  LA volume index = 142  cc/m2.  3. Moderate concentric left ventricular hypertrophy.  4. Normal left ventricular systolic function , with  paradoxical septal motion consistent with right heart  overload. No segmental wall motion abnormalities.  5. Normal right ventricular size with decreased right  ventricular systolic function.  6. Estimated pulmonary artery systolic pressure equals 55  mm Hg, assuming right atrial pressure equals 8 mm Hg,  consistent with moderate to severe pulmonary pressures.    < end of copied text >    Consultant(s) Notes Reviewed:      Care Discussed with Consultants/Other Providers:

## 2019-01-22 NOTE — PROGRESS NOTE ADULT - SUBJECTIVE AND OBJECTIVE BOX
Consult Cardiology Consult Progress Note  · Requested by Name:	GELACIO McintyreLe	  		  · Date/Time:	22-Jan-2019	  · Reason for Referral/Consultation:	chest pain	  · Reason for Admission	chest pain	      · Subjective and Objective: 	  Patient seen and evaluated at bedside    Chief Complaint: chest pain    HPI:  53 [place] female with Afib, HTN presents with chest pain. Patient reports midsternal burning chest pain started a few days ago. She says it is worse with coughing and exertion. It lasts for approx 30 mins and then goes away. She says that she has had CP for the last year but worsened a few weeks ago after a URI. She sees a cardiologist in [facility] and was going to undergo further testing but then she had this travel scheduled so decided to go through with it. No on AC for Afib because she was having hemoptysis when she was discovered to have AF, which was a few weeks ago.    =================  Interval Events  - planning for [facility] given symptoms and RFs  - Concerning pulmonary nodules with hemoptysis  - Still with ongoing CP        =================      PMH:   HTN (hypertension)  Afib      PSH:   No significant past surgical history      Medications:   benzonatate 100 milliGRAM(s) Oral three times a day PRN  carvedilol 3.125 milliGRAM(s) Oral every 12 hours  cyanocobalamin Injectable 1000 MICROGram(s) IntraMuscular daily  influenza   Vaccine 0.5 milliLiter(s) IntraMuscular once  isosorbide   mononitrate ER Tablet (IMDUR) 30 milliGRAM(s) Oral daily  LORazepam     Tablet 0.5 milliGRAM(s) Oral at bedtime PRN  losartan 25 milliGRAM(s) Oral two times a day      Allergies:  No Known Allergies      FAMILY HISTORY:  Father - MI     Social History:  Smoking History: denies  Alcohol Use: denies  Drug Use: denies    Review of Systems:  REVIEW OF SYSTEMS:  CONSTITUTIONAL: No weakness, fevers or chills  EYES/ENT: No visual changes;  No dysphagia  NECK: No pain or stiffness  RESPIRATORY: No cough, wheezing, hemoptysis; +shortness of breath  CARDIOVASCULAR: +chest pain; No lower extremity edema  GASTROINTESTINAL: No abdominal or epigastric pain. No nausea, vomiting, or hematemesis; No diarrhea or constipation. No melena or hematochezia.  BACK: No back pain  GENITOURINARY: No dysuria, frequency or hematuria  NEUROLOGICAL: No numbness or weakness  SKIN: No itching, burning, rashes, or lesions   All other review of systems is negative unless indicated above.    Physical Exam:  T(F): 98.1 (01-21), Max: 98.2 (01-20)  HR: 67 (01-21) (67 - 83)  BP: 133/90 (01-21) (129/79 - 147/83)  RR: 20 (01-21)  SpO2: 97% (01-21)  GENERAL: No acute distress, well-developed  HEAD:  Atraumatic, Normocephalic  ENT: EOMI, PERRLA, conjunctiva and sclera clear, Neck supple, No JVD, moist mucosa  CHEST/LUNG: Clear to auscultation bilaterally; No wheeze, equal breath sounds bilaterally   BACK: No spinal tenderness  HEART: Irregular rhythm; No murmurs, rubs, or gallops  ABDOMEN: Soft, Nontender, Nondistended; Bowel sounds present  EXTREMITIES:  No clubbing, cyanosis, or edema  PSYCH: Nl behavior, nl affect  NEUROLOGY: AAOx3, non-focal, cranial nerves intact  SKIN: Normal color, No rashes or lesions    Cardiovascular Diagnostic Testing:    ECG: Personally reviewed  AF, HR 62, Q waves V1, V2, AVL    Labs: Personally reviewed 01/22/2019                        13.3   6.70  )-----------( 252      ( 21 Jan 2019 08:39 )             40.6     01-21    143  |  105  |  18  ----------------------------<  100<H>  3.8   |  26  |  0.71    Ca    9.6      21 Jan 2019 07:18  Phos  3.6     01-21  Mg     2.0     01-21    TPro  8.6<H>  /  Alb  4.5  /  TBili  0.8  /  DBili  x   /  AST  26  /  ALT  20  /  AlkPhos  108  01-20    PT/INR - ( 20 Jan 2019 11:12 )   PT: 14.8 sec;   INR: 1.28 ratio         PTT - ( 20 Jan 2019 11:12 )  PTT:37.6 sec            Thyroid Stimulating Hormone, Serum: 2.85 uIU/mL (01-21 @ 08:39)      Assessment and Recommendation:   · Assessment		  53 [place] female with Afib, HTN presents with chest pain.    Chest pain. Concern for unstable angina but also has some atypical components (worsened after URI, lasts for 30 mins at a time).   - LHC  - TTE  - cont Coreg and ARB    AF. CHADS-VASc 2.  - cont coreg    H/o hemoptysis/pulmonary nodules. Unclear how much this is contributing to SOB/CP.  - Would check a Chest CT

## 2019-01-22 NOTE — PROGRESS NOTE ADULT - ASSESSMENT
53F [place] hx afib not on AC, htn p/w chest pain. Patient reports midsternal burning chest pain started a few days ago, constant, gradually worsening worse with cough/deep breath and endorses slight shortness of breath. Radiates to R shoulder. Non exertional/non positional. Denies leg swelling, but notes that she returned on flight from [facility] yesterday (6hr flight). Also notes recent fevers/cough began approximately 1-2 weeks ago. No history of clots, no recent procedures/hospitalizations.    chest pain ACS  -  tele monitior  - HS trop neg x 2  - 2 d echo done  - cardio consult appreciated  - cardiac cath today    liver cirrhosis on CT  - hepatology consult was called.  no inpt workup is indicated.  pt to follow up out pt with liver clinic  - liver sono    Afib /  HTN  - cw coreg  - on a/c    PULM nodule  - nedds repeat imaging in 1 month  - follow up with pulm clinic

## 2019-01-22 NOTE — CHART NOTE - NSCHARTNOTEFT_GEN_A_CORE
Removal of Radial Band    Pulses in the right upper extremity are palpable. The patient was placed in the supine position. The insertion site was identified and the band deflated per protocol. The radial band was removed slowly. Direct pressure was applied for 3 minutes      Monitoring of the right wrist and both upper extremities including neuro-vascular checks and vital signs every 15 minutes x 4.    Complications: None

## 2019-01-22 NOTE — PROVIDER CONTACT NOTE (CRITICAL VALUE NOTIFICATION) - ACTION/TREATMENT ORDERED:
NP made aware. Follow Heparin Full Anticoagulant nomogram. Hold Heparin gtt for 60 min and restart Heparin at 9cc/hr.
Per nomogram, hold Heparin gtt for 1 hr and restart at 1200 units/hr

## 2019-01-23 DIAGNOSIS — I34.0 NONRHEUMATIC MITRAL (VALVE) INSUFFICIENCY: ICD-10-CM

## 2019-01-23 LAB
ALBUMIN SERPL ELPH-MCNC: 4 G/DL — SIGNIFICANT CHANGE UP (ref 3.3–5)
ALP SERPL-CCNC: 93 U/L — SIGNIFICANT CHANGE UP (ref 40–120)
ALT FLD-CCNC: 18 U/L — SIGNIFICANT CHANGE UP (ref 10–45)
ANION GAP SERPL CALC-SCNC: 13 MMOL/L — SIGNIFICANT CHANGE UP (ref 5–17)
APPEARANCE UR: CLEAR — SIGNIFICANT CHANGE UP
APTT BLD: 36.6 SEC — HIGH (ref 27.5–36.3)
APTT BLD: 67.4 SEC — HIGH (ref 27.5–36.3)
APTT BLD: 67.5 SEC — HIGH (ref 27.5–36.3)
AST SERPL-CCNC: 19 U/L — SIGNIFICANT CHANGE UP (ref 10–40)
BILIRUB SERPL-MCNC: 1 MG/DL — SIGNIFICANT CHANGE UP (ref 0.2–1.2)
BILIRUB UR-MCNC: NEGATIVE — SIGNIFICANT CHANGE UP
BLD GP AB SCN SERPL QL: NEGATIVE — SIGNIFICANT CHANGE UP
BUN SERPL-MCNC: 12 MG/DL — SIGNIFICANT CHANGE UP (ref 7–23)
CALCIUM SERPL-MCNC: 9.4 MG/DL — SIGNIFICANT CHANGE UP (ref 8.4–10.5)
CHLORIDE SERPL-SCNC: 105 MMOL/L — SIGNIFICANT CHANGE UP (ref 96–108)
CO2 SERPL-SCNC: 24 MMOL/L — SIGNIFICANT CHANGE UP (ref 22–31)
COLOR SPEC: SIGNIFICANT CHANGE UP
CREAT SERPL-MCNC: 0.59 MG/DL — SIGNIFICANT CHANGE UP (ref 0.5–1.3)
DIFF PNL FLD: NEGATIVE — SIGNIFICANT CHANGE UP
GLUCOSE SERPL-MCNC: 102 MG/DL — HIGH (ref 70–99)
GLUCOSE UR QL: NEGATIVE — SIGNIFICANT CHANGE UP
HCG SERPL-ACNC: 3.4 MIU/ML — SIGNIFICANT CHANGE UP
HCT VFR BLD CALC: 40.4 % — SIGNIFICANT CHANGE UP (ref 34.5–45)
HGB BLD-MCNC: 13.6 G/DL — SIGNIFICANT CHANGE UP (ref 11.5–15.5)
KETONES UR-MCNC: NEGATIVE — SIGNIFICANT CHANGE UP
LEUKOCYTE ESTERASE UR-ACNC: NEGATIVE — SIGNIFICANT CHANGE UP
MAGNESIUM SERPL-MCNC: 1.9 MG/DL — SIGNIFICANT CHANGE UP (ref 1.6–2.6)
MCHC RBC-ENTMCNC: 30 PG — SIGNIFICANT CHANGE UP (ref 27–34)
MCHC RBC-ENTMCNC: 33.7 GM/DL — SIGNIFICANT CHANGE UP (ref 32–36)
MCV RBC AUTO: 88.9 FL — SIGNIFICANT CHANGE UP (ref 80–100)
NITRITE UR-MCNC: NEGATIVE — SIGNIFICANT CHANGE UP
NT-PROBNP SERPL-SCNC: 815 PG/ML — HIGH (ref 0–300)
PH UR: 6 — SIGNIFICANT CHANGE UP (ref 5–8)
PLATELET # BLD AUTO: 215 K/UL — SIGNIFICANT CHANGE UP (ref 150–400)
POTASSIUM SERPL-MCNC: 3.6 MMOL/L — SIGNIFICANT CHANGE UP (ref 3.5–5.3)
POTASSIUM SERPL-SCNC: 3.6 MMOL/L — SIGNIFICANT CHANGE UP (ref 3.5–5.3)
PROT SERPL-MCNC: 7.5 G/DL — SIGNIFICANT CHANGE UP (ref 6–8.3)
PROT UR-MCNC: NEGATIVE — SIGNIFICANT CHANGE UP
RBC # BLD: 4.54 M/UL — SIGNIFICANT CHANGE UP (ref 3.8–5.2)
RBC # FLD: 13.1 % — SIGNIFICANT CHANGE UP (ref 10.3–14.5)
RH IG SCN BLD-IMP: POSITIVE — SIGNIFICANT CHANGE UP
SODIUM SERPL-SCNC: 142 MMOL/L — SIGNIFICANT CHANGE UP (ref 135–145)
SP GR SPEC: 1.01 — SIGNIFICANT CHANGE UP (ref 1.01–1.02)
UROBILINOGEN FLD QL: ABNORMAL
WBC # BLD: 6.5 K/UL — SIGNIFICANT CHANGE UP (ref 3.8–10.5)
WBC # FLD AUTO: 6.5 K/UL — SIGNIFICANT CHANGE UP (ref 3.8–10.5)

## 2019-01-23 PROCEDURE — 99254 IP/OBS CNSLTJ NEW/EST MOD 60: CPT | Mod: GC

## 2019-01-23 PROCEDURE — 99253 IP/OBS CNSLTJ NEW/EST LOW 45: CPT

## 2019-01-23 PROCEDURE — 99254 IP/OBS CNSLTJ NEW/EST MOD 60: CPT

## 2019-01-23 PROCEDURE — 76700 US EXAM ABDOM COMPLETE: CPT | Mod: 26

## 2019-01-23 PROCEDURE — 99233 SBSQ HOSP IP/OBS HIGH 50: CPT | Mod: GC

## 2019-01-23 PROCEDURE — 93880 EXTRACRANIAL BILAT STUDY: CPT | Mod: 26

## 2019-01-23 RX ORDER — HEPARIN SODIUM 5000 [USP'U]/ML
900 INJECTION INTRAVENOUS; SUBCUTANEOUS
Qty: 25000 | Refills: 0 | Status: DISCONTINUED | OUTPATIENT
Start: 2019-01-23 | End: 2019-01-25

## 2019-01-23 RX ADMIN — Medication 100 MILLIGRAM(S): at 21:19

## 2019-01-23 RX ADMIN — HEPARIN SODIUM 900 UNIT(S)/HR: 5000 INJECTION INTRAVENOUS; SUBCUTANEOUS at 19:37

## 2019-01-23 RX ADMIN — HEPARIN SODIUM 900 UNIT(S)/HR: 5000 INJECTION INTRAVENOUS; SUBCUTANEOUS at 12:17

## 2019-01-23 RX ADMIN — HEPARIN SODIUM 900 UNIT(S)/HR: 5000 INJECTION INTRAVENOUS; SUBCUTANEOUS at 05:55

## 2019-01-23 RX ADMIN — CARVEDILOL PHOSPHATE 3.12 MILLIGRAM(S): 80 CAPSULE, EXTENDED RELEASE ORAL at 05:31

## 2019-01-23 RX ADMIN — LOSARTAN POTASSIUM 25 MILLIGRAM(S): 100 TABLET, FILM COATED ORAL at 17:24

## 2019-01-23 RX ADMIN — CARVEDILOL PHOSPHATE 3.12 MILLIGRAM(S): 80 CAPSULE, EXTENDED RELEASE ORAL at 17:24

## 2019-01-23 RX ADMIN — Medication 0.5 MILLIGRAM(S): at 21:19

## 2019-01-23 RX ADMIN — PREGABALIN 1000 MICROGRAM(S): 225 CAPSULE ORAL at 12:20

## 2019-01-23 RX ADMIN — LOSARTAN POTASSIUM 25 MILLIGRAM(S): 100 TABLET, FILM COATED ORAL at 05:31

## 2019-01-23 RX ADMIN — ISOSORBIDE MONONITRATE 30 MILLIGRAM(S): 60 TABLET, EXTENDED RELEASE ORAL at 12:20

## 2019-01-23 RX ADMIN — ATORVASTATIN CALCIUM 80 MILLIGRAM(S): 80 TABLET, FILM COATED ORAL at 21:19

## 2019-01-23 NOTE — CONSULT NOTE ADULT - ASSESSMENT
53F [place]i hx afib not on AC, htn p/w chest pain. LHC showed clean coronaries, ECHO with severe mitral valve dz, CT-showed pulmonary nodules and pulm consult for eval prior to valve repair.     1.  Pulmonary nodules-  -Etiology: unclear at this time but could be infectious or malignancy  -Patient denies significant weight loss  -No tobacco use or exposures and no family history  -Would benefit from CT-guided biopsy of left lower lobe nodule prior to valve repair  -Please discuss case with Dr. MAMI Arriaga or Dr. Cobian re: scheduling a time    Rest of plan is as per primary team.    Gregorio Encarnacion DO  Pulmonary Fellow

## 2019-01-23 NOTE — CONSULT NOTE ADULT - SUBJECTIVE AND OBJECTIVE BOX
History of Present Illness:  53F [place] hx afib not on AC, htn p/w chest pain. Patient reports midsternal burning chest pain started a few days ago, constant, gradually worsening worse with cough/deep breath and endorses slight shortness of breath. Radiates to R shoulder. Non exertional/non positional. Denies leg swelling, but notes that she returned on flight from [facility] yesterday (6hr flight). Also notes recent fevers/cough began approximately 1-2 weeks ago. No history of clots, no recent procedures/hospitalizations.    CT Surgery consulted for Severe MR    Past Medical History  HTN (hypertension)  Afib    Past Surgical History  No significant past surgical history      MEDICATIONS  (STANDING):  atorvastatin 80 milliGRAM(s) Oral at bedtime  carvedilol 3.125 milliGRAM(s) Oral every 12 hours  heparin  Infusion. 900 Unit(s)/Hr (9 mL/Hr) IV Continuous <Continuous>  influenza   Vaccine 0.5 milliLiter(s) IntraMuscular once  isosorbide   mononitrate ER Tablet (IMDUR) 30 milliGRAM(s) Oral daily  losartan 25 milliGRAM(s) Oral two times a day    MEDICATIONS  (PRN):  benzonatate 100 milliGRAM(s) Oral three times a day PRN Cough  heparin  Injectable 6500 Unit(s) IV Push every 6 hours PRN For aPTT less than 40  heparin  Injectable 3000 Unit(s) IV Push every 6 hours PRN For aPTT between 40 - 57  LORazepam     Tablet 0.5 milliGRAM(s) Oral at bedtime PRN insomnia      Vital Signs Last 24 Hrs  T(C): 37.1 (01-23-19 @ 05:00), Max: 37.1 (01-23-19 @ 05:00)  T(F): 98.8 (01-23-19 @ 05:00), Max: 98.8 (01-23-19 @ 05:00)  HR: 93 (01-23-19 @ 05:00) (66 - 93)  BP: 142/80 (01-23-19 @ 05:00) (130/84 - 167/116)  RR: 18 (01-23-19 @ 05:00) (18 - 18)  SpO2: 100% (01-23-19 @ 05:00) (97% - 100%)           Weight (kg): 81.6 (20 Jan 2019 10:24)  Allergies: No Known Allergies    SOCIAL HISTORY:   Traveler from [facility]  Smoker: [ ] Yes  [x ] No        PACK YEARS:                         WHEN QUIT?  ETOH use: [ ] Yes  [x ] No              FREQUENCY / QUANTITY:  Ilicit Drug use:  [ ] Yes  [x ] No  FAMILY HISTORY:  No pertinent family history in first degree relatives    Review of Systems  GENERAL:  no weakness, fatigue, fevers or chills  NEURO: no dizziness, numbness, tingling or weakness  SKIN: no itching, burning, rashes, or lesions   HEENT: no visual changes;  no headache, no vertigo, no recent colds  RESPIRATORY: no shortness of breath, no cough, sputum, wheezing, hemoptysis;   CARDIOVASCULAR:  no chest pain,  or palpitations  GI: no abd pain. no N/V/D.  PERIPHERAL VASCULAR: no swelling, no tenderness, no erythema, no varicose veins.     PHYSICAL EXAM  General: Well nourished, well developed, NAD.                                              Neuro: Normal exam oriented to person/place & time with no focal motor or sensory  deficits.                    Eyes: Normal exam of conjunctiva & lids, pupils equally reactive.   ENT: Normal exam of nasal/oral mucosa with absence of cyanosis.   Neck: Normal exam of jugular veins, trachea & thyroid.   Chest: Normal lung exam with good air movement absence of wheezes, rales, or rhonchi:                                                                          CV:  Auscultation: normal S1S2, Irregular  3/6  Murmurs   Carotids: No Bruits[x ]  Abdominal Aorta: normal [ x] nonpalpable[ ]                                                                         GI: Normal exam of abdomen with no noted masses or tenderness. +[id number]                                                                                            Extremities: Normal no evidence of cyanosis or deformity, Edema: negative   Lower Extremity Pulses: Right[ x] Left[ x]Varicosities[ -]  SKIN : Normal exam to inspection & palpation.                                                           LABS:                        13.6   6.5   )-----------( 215      ( 23 Jan 2019 05:35 )             40.4     01-23    142  |  105  |  12  ----------------------------<  102<H>  3.6   |  24  |  0.59    Ca    9.4      23 Jan 2019 05:35  Mg     1.9     01-23    TPro  7.5  /  Alb  4.0  /  TBili  1.0  /  DBili  x   /  AST  19  /  ALT  18  /  AlkPhos  93  01-23    PT/INR - ( 22 Jan 2019 09:56 )   PT: 15.2 sec;   INR: 1.31 ratio         PTT - ( 23 Jan 2019 11:32 )  PTT:67.4 sec      Cardiac Cath:  Preliminary 1/22/19  diagnostic via RRA luminal irregularities 90% mid LCX <2mm vessel too small to stent , Luminal irregularities to RCA  TTE / ABBY:< from: Transthoracic Echocardiogram (01.22.19 @ 07:32) >  EF 61%  1. Doming and thickening of the tips of the mitral valve  consistent with rheumatic mitral valve disease. Severe  mitral regurgitation. Mean transmitral valve gradient  equals 8 mm Hg, consistent with moderate mitral stenosis.  2. Severely dilated left atrium.  LA volume index = 142  cc/m2.  3. Moderate concentric left ventricular hypertrophy.  4. Normal left ventricular systolic function , with  paradoxical septal motion consistent with right heart  overload. No segmental wall motion abnormalities.  5. Normal right ventricular size with decreased right  ventricular systolic function.  6. Estimated pulmonary artery systolic pressure equals 55  mm Hg, assuming right atrial pressure equals 8 mm Hg,  consistent with moderate to severe pulmonary pressures.  < from: CT Chest No Cont (01.20.19 @ 12:38) >  Indeterminate 1.6 cm left lower lobe pulmonary nodule; suggest one-month   follow-up chest CT.    Mosaic attenuation may reflect pulmonary hypertension.    Massivecardiomegaly.    Cirrhotic liver.

## 2019-01-23 NOTE — PROGRESS NOTE ADULT - SUBJECTIVE AND OBJECTIVE BOX
Patient seen and examined at bedside.    Overnight Events: Denies CP, palpitations. Continues to have orthopnea and PND.       REVIEW OF SYSTEMS:  Constitutional:     No fevers, chills, weight loss, weight gain  HEENT:                  No dry eyes, nasal congestion, postnasal drip  CV:                         No chest pain, palpitations, orthopnea, PND  Resp:                     No cough, SOB, dyspnea, wheezing, sputum  GI:                          No nausea, vomiting, abdominal pain, diarrhea, constipation  :                        No dysuria, nocturia, hematuria, increased urinary frequency  Musculoskeletal: No back pain, myalgias, arthralgias   Skin:                       No rash, pruritus, ecchymoses  Neurological:        No headache, dizziness, syncope, weakness, numbness  Psychiatric:           No anxiety, depression   Endocrine:            No hot/cold intolerance, polydipsia  Heme/Lymph:      No bleeding, easy bruising  Allergic/Immune: No itchy eyes, rhinorrhea, hives angioedema      Current Meds:  atorvastatin 80 milliGRAM(s) Oral at bedtime  benzonatate 100 milliGRAM(s) Oral three times a day PRN  carvedilol 3.125 milliGRAM(s) Oral every 12 hours  heparin  Infusion. 900 Unit(s)/Hr IV Continuous <Continuous>  heparin  Injectable 6500 Unit(s) IV Push every 6 hours PRN  heparin  Injectable 3000 Unit(s) IV Push every 6 hours PRN  influenza   Vaccine 0.5 milliLiter(s) IntraMuscular once  isosorbide   mononitrate ER Tablet (IMDUR) 30 milliGRAM(s) Oral daily  LORazepam     Tablet 0.5 milliGRAM(s) Oral at bedtime PRN  losartan 25 milliGRAM(s) Oral two times a day      PAST MEDICAL & SURGICAL HISTORY:  HTN (hypertension)  Afib  No significant past surgical history      Vitals:  T(F): 98.6 (01-23), Max: 98.8 (01-23)  HR: 71 (01-23) (66 - 93)  BP: 157/99 (01-23) (130/84 - 167/116)  RR: 18 (01-23)  SpO2: 100% (01-23)  I&O's Summary    22 Jan 2019 07:01  -  23 Jan 2019 07:00  --------------------------------------------------------  IN: 81 mL / OUT: 0 mL / NET: 81 mL    23 Jan 2019 07:01  -  23 Jan 2019 14:33  --------------------------------------------------------  IN: 360 mL / OUT: 0 mL / NET: 360 mL        Physical Exam:  Appearance: No acute distress; well appearing  Eyes: PERRL, EOMI, pink conjunctiva  HENT: Normal oral mucosa  Cardiovascular: irregularly irregular, S1, S2, diastolic murmur; +JVD  Respiratory: Clear to auscultation bilaterally  Gastrointestinal: soft, non-tender, non-distended with normal bowel sounds  Musculoskeletal: No clubbing; no joint deformity   Neurologic: Non-focal  Lymphatic: No lymphadenopathy  Psychiatry: AAOx3, mood & affect appropriate  Skin: No rashes, ecchymoses, or cyanosis                          13.6   6.5   )-----------( 215      ( 23 Jan 2019 05:35 )             40.4     01-23    142  |  105  |  12  ----------------------------<  102<H>  3.6   |  24  |  0.59    Ca    9.4      23 Jan 2019 05:35  Mg     1.9     01-23    TPro  7.5  /  Alb  4.0  /  TBili  1.0  /  DBili  x   /  AST  19  /  ALT  18  /  AlkPhos  93  01-23    PT/INR - ( 22 Jan 2019 09:56 )   PT: 15.2 sec;   INR: 1.31 ratio         PTT - ( 23 Jan 2019 11:32 )  PTT:67.4 sec          Cath:      Echo:  < from: Transthoracic Echocardiogram (01.22.19 @ 07:32) >  Dimensions:    Normal Values:  LA:     7.2    2.0 - 4.0 cm  Ao:     2.3    2.0 - 3.8 cm  SEPTUM: 1.3    0.6 - 1.2 cm  PWT:    1.3  0.6 - 1.1 cm  LVIDd:  5.7    3.0 - 5.6 cm  LVIDs:  3.8    1.8 - 4.0 cm  Derived variables:  LVMI: 176 g/m2  RWT: 0.45  Fractional short: 33 %  EF (Teicholtz): 61 %  Doppler Peak Velocity (m/sec): AoV=1.2  ------------------------------------------------------------------------  Conclusions:  1. Doming and thickening of the tips of the mitral valve  consistent with rheumatic mitral valve disease. Severe  mitral regurgitation. Mean transmitral valve gradient  equals 8 mm Hg, consistent with moderate mitral stenosis.  2. Severely dilated left atrium.  LA volume index = 142  cc/m2.  3. Moderate concentric left ventricular hypertrophy.  4. Normal left ventricular systolic function , with  paradoxical septal motion consistent with right heart  overload. No segmental wall motion abnormalities.  5. Normal right ventricular size with decreased right  ventricular systolic function.  6. Estimated pulmonary artery systolic pressure equals 55  mm Hg, assuming right atrial pressure equals 8 mm Hg,  consistent with moderate to severe pulmonary pressures.    < end of copied text >      Interpretation of Telemetry: AFib Patient seen and examined at bedside.    Overnight Events: Denies CP, palpitations. Continues to have orthopnea and PND.       REVIEW OF SYSTEMS:  Constitutional:     No fevers, chills, weight loss, weight gain  HEENT:                  No dry eyes, nasal congestion, postnasal drip  CV:                         No chest pain, palpitations, orthopnea, PND  Resp:                     No cough, SOB, dyspnea, wheezing, sputum  GI:                          No nausea, vomiting, abdominal pain, diarrhea, constipation  :                        No dysuria, nocturia, hematuria, increased urinary frequency  Musculoskeletal: No back pain, myalgias, arthralgias   Skin:                       No rash, pruritus, ecchymoses  Neurological:        No headache, dizziness, syncope, weakness, numbness  Psychiatric:           No anxiety, depression   Endocrine:            No hot/cold intolerance, polydipsia  Heme/Lymph:      No bleeding, easy bruising  Allergic/Immune: No itchy eyes, rhinorrhea, hives angioedema      Current Meds:  atorvastatin 80 milliGRAM(s) Oral at bedtime  benzonatate 100 milliGRAM(s) Oral three times a day PRN  carvedilol 3.125 milliGRAM(s) Oral every 12 hours  heparin  Infusion. 900 Unit(s)/Hr IV Continuous <Continuous>  heparin  Injectable 6500 Unit(s) IV Push every 6 hours PRN  heparin  Injectable 3000 Unit(s) IV Push every 6 hours PRN  influenza   Vaccine 0.5 milliLiter(s) IntraMuscular once  isosorbide   mononitrate ER Tablet (IMDUR) 30 milliGRAM(s) Oral daily  LORazepam     Tablet 0.5 milliGRAM(s) Oral at bedtime PRN  losartan 25 milliGRAM(s) Oral two times a day      PAST MEDICAL & SURGICAL HISTORY:  HTN (hypertension)  Afib  No significant past surgical history      Vitals:  T(F): 98.6 (01-23), Max: 98.8 (01-23)  HR: 71 (01-23) (66 - 93)  BP: 157/99 (01-23) (130/84 - 167/116)  RR: 18 (01-23)  SpO2: 100% (01-23)  I&O's Summary    22 Jan 2019 07:01  -  23 Jan 2019 07:00  --------------------------------------------------------  IN: 81 mL / OUT: 0 mL / NET: 81 mL    23 Jan 2019 07:01  -  23 Jan 2019 14:33  --------------------------------------------------------  IN: 360 mL / OUT: 0 mL / NET: 360 mL        Physical Exam:  Appearance: No acute distress; well appearing  Eyes: PERRL, EOMI, pink conjunctiva  HENT: Normal oral mucosa  Cardiovascular: irregularly irregular, S1, S2, diastolic murmur; +JVD  Respiratory: Clear to auscultation bilaterally  Gastrointestinal: soft, non-tender, non-distended with normal bowel sounds  Musculoskeletal: No clubbing; no joint deformity   Neurologic: Non-focal  Lymphatic: No lymphadenopathy  Psychiatry: AAOx3, mood & affect appropriate  Skin: No rashes, ecchymoses, or cyanosis                          13.6   6.5   )-----------( 215      ( 23 Jan 2019 05:35 )             40.4     01-23    142  |  105  |  12  ----------------------------<  102<H>  3.6   |  24  |  0.59    Ca    9.4      23 Jan 2019 05:35  Mg     1.9     01-23    TPro  7.5  /  Alb  4.0  /  TBili  1.0  /  DBili  x   /  AST  19  /  ALT  18  /  AlkPhos  93  01-23    PT/INR - ( 22 Jan 2019 09:56 )   PT: 15.2 sec;   INR: 1.31 ratio         PTT - ( 23 Jan 2019 11:32 )  PTT:67.4 sec            Echo:  < from: Transthoracic Echocardiogram (01.22.19 @ 07:32) >  Dimensions:    Normal Values:  LA:     7.2    2.0 - 4.0 cm  Ao:     2.3    2.0 - 3.8 cm  SEPTUM: 1.3    0.6 - 1.2 cm  PWT:    1.3  0.6 - 1.1 cm  LVIDd:  5.7    3.0 - 5.6 cm  LVIDs:  3.8    1.8 - 4.0 cm  Derived variables:  LVMI: 176 g/m2  RWT: 0.45  Fractional short: 33 %  EF (Teicholtz): 61 %  Doppler Peak Velocity (m/sec): AoV=1.2  ------------------------------------------------------------------------  Conclusions:  1. Doming and thickening of the tips of the mitral valve  consistent with rheumatic mitral valve disease. Severe  mitral regurgitation. Mean transmitral valve gradient  equals 8 mm Hg, consistent with moderate mitral stenosis.  2. Severely dilated left atrium.  LA volume index = 142  cc/m2.  3. Moderate concentric left ventricular hypertrophy.  4. Normal left ventricular systolic function , with  paradoxical septal motion consistent with right heart  overload. No segmental wall motion abnormalities.  5. Normal right ventricular size with decreased right  ventricular systolic function.  6. Estimated pulmonary artery systolic pressure equals 55  mm Hg, assuming right atrial pressure equals 8 mm Hg,  consistent with moderate to severe pulmonary pressures.    < end of copied text >      Interpretation of Telemetry: AFib

## 2019-01-23 NOTE — PROGRESS NOTE ADULT - SUBJECTIVE AND OBJECTIVE BOX
Patient is a 53y old  Female who presents with a chief complaint of chest pain (22 Jan 2019 22:28)      SUBJECTIVE / OVERNIGHT EVENTS: s/p cath.  verbal report: clean coronaries.  No chest pain. No shortness of breath. No complaints. No events overnight.     MEDICATIONS  (STANDING):  atorvastatin 80 milliGRAM(s) Oral at bedtime  carvedilol 3.125 milliGRAM(s) Oral every 12 hours  heparin  Infusion. 900 Unit(s)/Hr (9 mL/Hr) IV Continuous <Continuous>  influenza   Vaccine 0.5 milliLiter(s) IntraMuscular once  isosorbide   mononitrate ER Tablet (IMDUR) 30 milliGRAM(s) Oral daily  losartan 25 milliGRAM(s) Oral two times a day    MEDICATIONS  (PRN):  benzonatate 100 milliGRAM(s) Oral three times a day PRN Cough  heparin  Injectable 6500 Unit(s) IV Push every 6 hours PRN For aPTT less than 40  heparin  Injectable 3000 Unit(s) IV Push every 6 hours PRN For aPTT between 40 - 57  LORazepam     Tablet 0.5 milliGRAM(s) Oral at bedtime PRN insomnia      Vital Signs Last 24 Hrs  T(C): 37.1 (23 Jan 2019 05:00), Max: 37.1 (23 Jan 2019 05:00)  T(F): 98.8 (23 Jan 2019 05:00), Max: 98.8 (23 Jan 2019 05:00)  HR: 93 (23 Jan 2019 05:00) (66 - 93)  BP: 142/80 (23 Jan 2019 05:00) (130/84 - 167/116)  BP(mean): --  RR: 18 (23 Jan 2019 05:00) (18 - 18)  SpO2: 100% (23 Jan 2019 05:00) (97% - 100%)  CAPILLARY BLOOD GLUCOSE        I&O's Summary    22 Jan 2019 07:01  -  23 Jan 2019 07:00  --------------------------------------------------------  IN: 81 mL / OUT: 0 mL / NET: 81 mL        PHYSICAL EXAM:  GENERAL: NAD, well-developed  HEAD:  Atraumatic, Normocephalic  EYES: EOMI, PERRLA, conjunctiva and sclera clear  NECK: Supple, No JVD  CHEST/LUNG: Clear to auscultation bilaterally; No wheeze  HEART: Regular rate and rhythm; No murmurs, rubs, or gallops  ABDOMEN: Soft, Nontender, Nondistended; Bowel sounds present  EXTREMITIES:  2+ Peripheral Pulses, No clubbing, cyanosis, or edema  PSYCH: AAOx3  NEUROLOGY: non-focal  SKIN: No rashes or lesions    LABS:                        13.6   6.5   )-----------( 215      ( 23 Jan 2019 05:35 )             40.4     01-23    142  |  105  |  12  ----------------------------<  102<H>  3.6   |  24  |  0.59    Ca    9.4      23 Jan 2019 05:35  Mg     1.9     01-23    TPro  7.5  /  Alb  4.0  /  TBili  1.0  /  DBili  x   /  AST  19  /  ALT  18  /  AlkPhos  93  01-23    PT/INR - ( 22 Jan 2019 09:56 )   PT: 15.2 sec;   INR: 1.31 ratio         PTT - ( 23 Jan 2019 11:32 )  PTT:67.4 sec          RADIOLOGY & ADDITIONAL TESTS:    Imaging Personally Reviewed:    < from: Transthoracic Echocardiogram (01.22.19 @ 07:32) >  Conclusions:  1. Doming and thickening of the tips of the mitral valve  consistent with rheumatic mitral valve disease. Severe  mitral regurgitation. Mean transmitral valve gradient  equals 8 mm Hg, consistent with moderate mitral stenosis.  2. Severely dilated left atrium.  LA volume index = 142  cc/m2.  3. Moderate concentric left ventricular hypertrophy.  4. Normal left ventricular systolic function , with  paradoxical septal motion consistent with right heart  overload. No segmental wall motion abnormalities.  5. Normal right ventricular size with decreased right  ventricular systolic function.  6. Estimated pulmonary artery systolic pressure equals 55  mm Hg, assuming right atrial pressure equals 8 mm Hg,  consistent with moderate to severe pulmonary pressures.  ------------------------------------------------------------------------    < end of copied text >  < from: US Abdomen Complete (01.23.19 @ 12:02) >  IMPRESSION:     Mildly nodular liver edge, consistent with cirrhosis.      < end of copied text >    Consultant(s) Notes Reviewed:      Care Discussed with Consultants/Other Providers:

## 2019-01-23 NOTE — PROGRESS NOTE ADULT - ASSESSMENT
53F [place] hx afib not on AC, htn p/w chest pain. Patient reports midsternal burning chest pain started a few days ago, constant, gradually worsening worse with cough/deep breath and endorses slight shortness of breath. Radiates to R shoulder. Non exertional/non positional. Denies leg swelling, but notes that she returned on flight from [facility] yesterday (6hr flight). Also notes recent fevers/cough began approximately 1-2 weeks ago. No history of clots, no recent procedures/hospitalizations.    chest pain ACS  -  tele monitior  - HS trop neg x 2  - 2 d echo done  - cardio consult appreciated  - s/p cardiac cath   - cont medical management    liver cirrhosis on CT  - hepatology consult was called.  no inpt workup is indicated.  pt to follow up out pt with liver clinic  - liver sono done    Afib /  HTN  - cw coreg  - on a/c    PULM nodule  - needs repeat imaging in 1 month  - follow up with pulm clinic    d/c planning if cleared by cardiology.

## 2019-01-23 NOTE — CONSULT NOTE ADULT - SUBJECTIVE AND OBJECTIVE BOX
CHIEF COMPLAINT:    HPI:    PAST MEDICAL & SURGICAL HISTORY:  HTN (hypertension)  Afib  No significant past surgical history      FAMILY HISTORY:  No pertinent family history in first degree relatives      SOCIAL HISTORY:  Smoking: __ packs x ___ years  EtOH Use:  Marital Status:  Occupation:  Recent Travel:  Country of Birth:  Advance Directives:    Allergies    No Known Allergies    Intolerances        HOME MEDICATIONS:    REVIEW OF SYSTEMS:  Constitutional:   Eyes:  ENT:  CV:  Resp:  GI:  :  MSK:  Integumentary:  Neurological:  Psychiatric:  Endocrine:  Hematologic/Lymphatic:  Allergic/Immunologic:  [ ] All other systems negative  [ ] Unable to assess ROS because ________    OBJECTIVE:  ICU Vital Signs Last 24 Hrs  T(C): 37 (23 Jan 2019 13:32), Max: 37.1 (23 Jan 2019 05:00)  T(F): 98.6 (23 Jan 2019 13:32), Max: 98.8 (23 Jan 2019 05:00)  HR: 71 (23 Jan 2019 13:32) (66 - 93)  BP: 157/99 (23 Jan 2019 13:32) (130/84 - 167/116)  BP(mean): --  ABP: --  ABP(mean): --  RR: 18 (23 Jan 2019 13:32) (18 - 18)  SpO2: 100% (23 Jan 2019 13:32) (97% - 100%)        01-22 @ 07:01 - 01-23 @ 07:00  --------------------------------------------------------  IN: 81 mL / OUT: 0 mL / NET: 81 mL    01-23 @ 07:01 - 01-23 @ 16:24  --------------------------------------------------------  IN: 360 mL / OUT: 0 mL / NET: 360 mL      CAPILLARY BLOOD GLUCOSE          PHYSICAL EXAM:  General:  AAOx3  HEENT: EOMI, PERRL  Lymph Nodes: No LAD  Neck: JVP 4-6cm  Respiratory:   Cardiovascular: RRR, no m/r/g  Abdomen: soft, NT/ND, no HSM  Extremities: no c/c/e  Skin: nl. skin turgor  Neurological: no deficitis      HOSPITAL MEDICATIONS:  MEDICATIONS  (STANDING):  atorvastatin 80 milliGRAM(s) Oral at bedtime  carvedilol 3.125 milliGRAM(s) Oral every 12 hours  heparin  Infusion. 900 Unit(s)/Hr (9 mL/Hr) IV Continuous <Continuous>  influenza   Vaccine 0.5 milliLiter(s) IntraMuscular once  isosorbide   mononitrate ER Tablet (IMDUR) 30 milliGRAM(s) Oral daily  losartan 25 milliGRAM(s) Oral two times a day    MEDICATIONS  (PRN):  benzonatate 100 milliGRAM(s) Oral three times a day PRN Cough  heparin  Injectable 6500 Unit(s) IV Push every 6 hours PRN For aPTT less than 40  heparin  Injectable 3000 Unit(s) IV Push every 6 hours PRN For aPTT between 40 - 57  LORazepam     Tablet 0.5 milliGRAM(s) Oral at bedtime PRN insomnia      LABS:                        13.6   6.5   )-----------( 215      ( 23 Jan 2019 05:35 )             40.4     01-23    142  |  105  |  12  ----------------------------<  102<H>  3.6   |  24  |  0.59    Ca    9.4      23 Jan 2019 05:35  Mg     1.9     01-23    TPro  7.5  /  Alb  4.0  /  TBili  1.0  /  DBili  x   /  AST  19  /  ALT  18  /  AlkPhos  93  01-23    PT/INR - ( 22 Jan 2019 09:56 )   PT: 15.2 sec;   INR: 1.31 ratio         PTT - ( 23 Jan 2019 11:32 )  PTT:67.4 sec          MICROBIOLOGY:     RADIOLOGY:  [ ] Reviewed and interpreted by me    PULMONARY FUNCTION TESTS:    EKG: CHIEF COMPLAINT: SOB    HPI:  54 y/o F with PMH of rheumatic fever as a child, AF not on AC [facility].  En route, she deeveopled chest discomfort, presented to the ED for further evaluation.  She went for [facility] which was clean but found to have moderate MS and severe MRRimma  Pulmonary consulted for evaluation of pulmonary nodules prior to valve replacement surgery.    Of note, she is a non-smoker.  Cooked on a gas stove.  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      No family history of malignancy.    PAST MEDICAL & SURGICAL HISTORY:  HTN (hypertension)  Afib  No significant past surgical history  FAMILY HISTORY:  No pertinent family history in first degree relatives      SOCIAL HISTORY:  Smoking: denies  EtOH Use: denies  Marital Status:   Occupation: house wife  Recent Travel: [facility]       Allergies    No Known Allergies    Intolerances    HOME MEDICATIONS:  · 	losartan 50 mg oral tablet: 0.5 tab(s) orally every 12 hours, Last Dose Taken:    · 	isosorbide mononitrate 30 mg oral tablet, extended release: 1 tab(s) orally once a day (in the morning), Last Dose Taken:    · 	Coreg 3.125 mg oral tablet: 1 tab(s) orally once a day  	  REVIEW OF SYSTEMS:  Constitutional: fatigue  Eyes:  ENT:  CV: chest pain  Resp: sob  GI: abd pain  :  MSK:  Integumentary:  Neurological:  Psychiatric:  Endocrine:  Hematologic/Lymphatic:  Allergic/Immunologic:  [x] All other systems negative  [ ] Unable to assess ROS because ________    OBJECTIVE:  ICU Vital Signs Last 24 Hrs  T(C): 37 (23 Jan 2019 13:32), Max: 37.1 (23 Jan 2019 05:00)  T(F): 98.6 (23 Jan 2019 13:32), Max: 98.8 (23 Jan 2019 05:00)  HR: 71 (23 Jan 2019 13:32) (66 - 93)  BP: 157/99 (23 Jan 2019 13:32) (130/84 - 167/116)  BP(mean): --  ABP: --  ABP(mean): --  RR: 18 (23 Jan 2019 13:32) (18 - 18)  SpO2: 100% (23 Jan 2019 13:32) (97% - 100%)        01-22 @ 07:01  -  01-23 @ 07:00  --------------------------------------------------------  IN: 81 mL / OUT: 0 mL / NET: 81 mL    01-23 @ 07:01  - 01-23 @ 16:24  --------------------------------------------------------  IN: 360 mL / OUT: 0 mL / NET: 360 mL      CAPILLARY BLOOD GLUCOSE          PHYSICAL EXAM:  General:  AAOx3  HEENT: EOMI, PERRL  Lymph Nodes: No LAD  Respiratory: dec bs at bases  Cardiovascular: AF, diastolic murmur  Abdomen: soft, NT/ND, no HSM  Extremities: no c/c/e  Skin: nl. skin turgor  Neurological: no deficitis      HOSPITAL MEDICATIONS:  MEDICATIONS  (STANDING):  atorvastatin 80 milliGRAM(s) Oral at bedtime  carvedilol 3.125 milliGRAM(s) Oral every 12 hours  heparin  Infusion. 900 Unit(s)/Hr (9 mL/Hr) IV Continuous <Continuous>  influenza   Vaccine 0.5 milliLiter(s) IntraMuscular once  isosorbide   mononitrate ER Tablet (IMDUR) 30 milliGRAM(s) Oral daily  losartan 25 milliGRAM(s) Oral two times a day    MEDICATIONS  (PRN):  benzonatate 100 milliGRAM(s) Oral three times a day PRN Cough  heparin  Injectable 6500 Unit(s) IV Push every 6 hours PRN For aPTT less than 40  heparin  Injectable 3000 Unit(s) IV Push every 6 hours PRN For aPTT between 40 - 57  LORazepam     Tablet 0.5 milliGRAM(s) Oral at bedtime PRN insomnia      LABS:                        13.6   6.5   )-----------( 215      ( 23 Jan 2019 05:35 )             40.4     01-23    142  |  105  |  12  ----------------------------<  102<H>  3.6   |  24  |  0.59    Ca    9.4      23 Jan 2019 05:35  Mg     1.9     01-23    TPro  7.5  /  Alb  4.0  /  TBili  1.0  /  DBili  x   /  AST  19  /  ALT  18  /  AlkPhos  93  01-23    PT/INR - ( 22 Jan 2019 09:56 )   PT: 15.2 sec;   INR: 1.31 ratio         PTT - ( 23 Jan 2019 11:32 )  PTT:67.4 sec          RADIOLOGY:  [x] Reviewed and interpreted by me

## 2019-01-23 NOTE — PROGRESS NOTE ADULT - ASSESSMENT
53 Croat[place] female with Afib, HTN who presented with chest pain.    ****Note in progress    #HFrEF  #Chest pain   - LHC with clean cors  - cont Coreg and ARB    #Severe MR  #Mod MS  - CT surgery consulted, appreciate recs    AFib   - CHADS-VASc 2.  - cont coreg  - hep gtt    EARL Maloney  Cardiology Fellow  [id number] 53 Citizen of [place] female with Afib, HTN who presented with chest pain. LHC with clean coronaries. TTE with reduced EF, severe MR and mod MS.     #HFrEF  #Chest pain   - LHC with clean cors  - cont Coreg and ARB    #Severe MR  #Mod MS  - LA severely dilated  - CT surgery consulted, appreciate recs    AFib   - CHADS-VASc 2.  - cont coreg  - hep gtt    EARL Maloney  Cardiology Fellow  [id number]

## 2019-01-23 NOTE — CONSULT NOTE ADULT - PROBLEM SELECTOR RECOMMENDATION 9
TELE  [facility] TSH Pro BNP  bedside spirometry   cardiac surgery workup and evaluation  Pulmonary  consult  care as per primary Team  D/w Dr Bellamy

## 2019-01-23 NOTE — CONSULT NOTE ADULT - ASSESSMENT
This is a  53F traveler from  [facility] hx afib not on AC, htn p/w chest pain. Endorses a  hospitalization for her heart during childhood..  Presented to [facility] on 1/20/19  with c/o  midsternal burning chest pain started a few days ago, constant, gradually worsening worse with cough/deep breath and endorses slight shortness of breath. Radiates to R shoulder.  Non exertional/non positional. Denies leg swelling, but notes that she recently traveled  on flight from [facility] yesterday (6hr flight). Also notes recent fevers/cough began approximately 1-2 weeks ago. No history of clots, no recent procedures/hospitalizations .  Chest Ct reveals LLL pulmonary nodule  1.6cm  and cirrhotic liver.  Abdominal US mildly nodular liver edge consistent with cirrhosis. Hepatology  recommends outpatient follow up.  Echocardiogram revels  donning and thickening of mitral valve consistent with RHD severe Mitral regurgitation moderate stenosis with  moderate to severe pulmonary pressures.

## 2019-01-23 NOTE — PROVIDER CONTACT NOTE (OTHER) - ASSESSMENT
stable in no distress, resting comfortable during episode, VS- see flowsheet.  Cath site benign with no hematoma noted, dsg dry & intact. Extremity warm, mobile with positive pulses. Pt denies numbness, tingling, pain or discomfort. Denies chest pain , palpitations, DOCKERY noted. no visual disturbances, no HA.

## 2019-01-23 NOTE — CHART NOTE - NSCHARTNOTEFT_GEN_A_CORE
Notified by RN - patient had 3.4 sec pause on tele with underlying Afib. Patient is asymptomatic. VS stable. Patient had 2 sec pause overnight per tele. Patient on low dose coreg. Discussed case with overnight cardiology fellow - no intervention needed at this time. Continue coreg. Continue to monitor for further episodes.    Jose Cherry PA-C  Department of Medicine  #[id number]

## 2019-01-24 LAB
APTT BLD: 75.2 SEC — HIGH (ref 27.5–36.3)
HBA1C BLD-MCNC: 5.8 % — HIGH (ref 4–5.6)
HCT VFR BLD CALC: 40.7 % — SIGNIFICANT CHANGE UP (ref 34.5–45)
HGB BLD-MCNC: 13.8 G/DL — SIGNIFICANT CHANGE UP (ref 11.5–15.5)
MCHC RBC-ENTMCNC: 30.5 PG — SIGNIFICANT CHANGE UP (ref 27–34)
MCHC RBC-ENTMCNC: 34 GM/DL — SIGNIFICANT CHANGE UP (ref 32–36)
MCV RBC AUTO: 89.6 FL — SIGNIFICANT CHANGE UP (ref 80–100)
PLATELET # BLD AUTO: 246 K/UL — SIGNIFICANT CHANGE UP (ref 150–400)
RBC # BLD: 4.54 M/UL — SIGNIFICANT CHANGE UP (ref 3.8–5.2)
RBC # FLD: 13.5 % — SIGNIFICANT CHANGE UP (ref 10.3–14.5)
WBC # BLD: 7.6 K/UL — SIGNIFICANT CHANGE UP (ref 3.8–10.5)
WBC # FLD AUTO: 7.6 K/UL — SIGNIFICANT CHANGE UP (ref 3.8–10.5)

## 2019-01-24 PROCEDURE — 99254 IP/OBS CNSLTJ NEW/EST MOD 60: CPT | Mod: GC

## 2019-01-24 PROCEDURE — 99233 SBSQ HOSP IP/OBS HIGH 50: CPT | Mod: GC

## 2019-01-24 PROCEDURE — 76937 US GUIDE VASCULAR ACCESS: CPT | Mod: 26,GC

## 2019-01-24 PROCEDURE — 94010 BREATHING CAPACITY TEST: CPT | Mod: 26

## 2019-01-24 PROCEDURE — 93451 RIGHT HEART CATH: CPT | Mod: 26,GC

## 2019-01-24 RX ORDER — CEFUROXIME AXETIL 250 MG
1500 TABLET ORAL ONCE
Qty: 0 | Refills: 0 | Status: DISCONTINUED | OUTPATIENT
Start: 2019-01-24 | End: 2019-01-25

## 2019-01-24 RX ORDER — CHLORHEXIDINE GLUCONATE 213 G/1000ML
30 SOLUTION TOPICAL ONCE
Qty: 0 | Refills: 0 | Status: COMPLETED | OUTPATIENT
Start: 2019-01-24 | End: 2019-01-25

## 2019-01-24 RX ORDER — CHLORHEXIDINE GLUCONATE 213 G/1000ML
1 SOLUTION TOPICAL ONCE
Qty: 0 | Refills: 0 | Status: COMPLETED | OUTPATIENT
Start: 2019-01-24 | End: 2019-01-24

## 2019-01-24 RX ORDER — ATORVASTATIN CALCIUM 80 MG/1
40 TABLET, FILM COATED ORAL AT BEDTIME
Qty: 0 | Refills: 0 | Status: DISCONTINUED | OUTPATIENT
Start: 2019-01-24 | End: 2019-01-25

## 2019-01-24 RX ADMIN — ISOSORBIDE MONONITRATE 30 MILLIGRAM(S): 60 TABLET, EXTENDED RELEASE ORAL at 12:26

## 2019-01-24 RX ADMIN — LOSARTAN POTASSIUM 25 MILLIGRAM(S): 100 TABLET, FILM COATED ORAL at 05:26

## 2019-01-24 RX ADMIN — HEPARIN SODIUM 900 UNIT(S)/HR: 5000 INJECTION INTRAVENOUS; SUBCUTANEOUS at 07:12

## 2019-01-24 RX ADMIN — CARVEDILOL PHOSPHATE 3.12 MILLIGRAM(S): 80 CAPSULE, EXTENDED RELEASE ORAL at 05:26

## 2019-01-24 RX ADMIN — ATORVASTATIN CALCIUM 40 MILLIGRAM(S): 80 TABLET, FILM COATED ORAL at 23:06

## 2019-01-24 RX ADMIN — CARVEDILOL PHOSPHATE 3.12 MILLIGRAM(S): 80 CAPSULE, EXTENDED RELEASE ORAL at 20:25

## 2019-01-24 NOTE — PROVIDER CONTACT NOTE (OTHER) - ACTION/TREATMENT ORDERED:
PA aware , will continue to monitor
PA made aware, follow up with cards, continue to monitor status and maintain safety.

## 2019-01-24 NOTE — CONSULT NOTE ADULT - SUBJECTIVE AND OBJECTIVE BOX
Chief Complaint:  Patient is a 53y old  Female who presents with a chief complaint of chest pain (2019 13:03)    HPI:  53 year old female with hx of Afib (not on AC) and Hypertension presented with one day of chest pain that she describes as burning, affected the middle/right side of her chest and radiating the right shoulder. The pain was exacerbated by deep breathing without alleviating factors. These symptoms initially began on her flight here from [facility]. She denies fever, chills, shortness of breath, nausea, vomiting, diarrhea, hx of EtOH use, family hx of liver disease, constipation, dizziness, palpitations, melena, hematochezia and hematemesis. Of note, she reports a two week history of cough.    Allergies:  No Known Allergies    Home Medications:  Reviewed    Hospital Medications:  atorvastatin 40 milliGRAM(s) Oral at bedtime  benzonatate 100 milliGRAM(s) Oral three times a day PRN  carvedilol 3.125 milliGRAM(s) Oral every 12 hours  cefuroxime  IVPB 1500 milliGRAM(s) IV Intermittent once  chlorhexidine 0.12% Liquid 30 milliLiter(s) Swish and Spit once  chlorhexidine 4% Liquid 1 Application(s) Topical once  heparin  Infusion. 900 Unit(s)/Hr IV Continuous <Continuous>  heparin  Injectable 6500 Unit(s) IV Push every 6 hours PRN  heparin  Injectable 3000 Unit(s) IV Push every 6 hours PRN  influenza   Vaccine 0.5 milliLiter(s) IntraMuscular once  isosorbide   mononitrate ER Tablet (IMDUR) 30 milliGRAM(s) Oral daily  LORazepam     Tablet 0.5 milliGRAM(s) Oral at bedtime PRN    PMHX/PSHX:  HTN (hypertension)  Afib  No significant past surgical history    Family history:  No pertinent family history in first degree relatives  Denies family history of PUD, IBD, colon cancer/polyps, stomach cancer/polyps, pancreatic cancer/masses, liver cancer/disease, breast/ovarian cancer and endometrial cancer.     Social History:   No history of tobacco use, EtOH use or illicit drug use     ROS:   General:  No fevers, chills  ENT:  No sore throat or dysphagia  CV:  + pain No palpitations  Resp:  + cough, No dyspnea or wheezing  GI:  No pain, No nausea, No vomiting, No diarrhea, No constipation, No rectal bleeding, No tarry stools  Skin:  No rash or edema      PHYSICAL EXAM:   GENERAL:  NAD, Appears stated age  HEENT:  NC/AT,  conjunctivae clear and pink, sclera -anicteric  CHEST:  CTA B/L, Normal effort  HEART:  Irregularly irregular, + systolic murmur  ABDOMEN:  Soft, non-tender, non-distended, BS+  EXTREMITIES  No cyanosis or Edema  SKIN:  Warm & Dry.  NEURO:  Alert, oriented    Vital Signs:  Vital Signs Last 24 Hrs  T(C): 36.5 (2019 14:05), Max: 37 (2019 09:00)  T(F): 97.7 (2019 14:05), Max: 98.6 (2019 09:00)  HR: 80 (2019 14:05) (74 - 84)  BP: 133/105 (2019 14:05) (133/105 - 162/98)  BP(mean): --  RR: 16 (2019 14:05) (16 - 18)  SpO2: 94% (2019 14:05) (94% - 98%)  Daily     Daily     LABS:                        13.8   7.6   )-----------( 246      ( 2019 06:18 )             40.7     Mean Cell Volume: 89.6 fl (19 @ 06:18)        142  |  105  |  12  ----------------------------<  102<H>  3.6   |  24  |  0.59    Ca    9.4      2019 05:35  Mg     1.9         TPro  7.5  /  Alb  4.0  /  TBili  1.0  /  DBili  x   /  AST  19  /  ALT  18  /  AlkPhos  93      LIVER FUNCTIONS - ( 2019 05:35 )  Alb: 4.0 g/dL / Pro: 7.5 g/dL / ALK PHOS: 93 U/L / ALT: 18 U/L / AST: 19 U/L / GGT: x           PTT - ( 2019 06:18 )  PTT:75.2 sec  Urinalysis Basic - ( 2019 18:43 )    Color: Light Yellow / Appearance: Clear / S.014 / pH: x  Gluc: x / Ketone: Negative  / Bili: Negative / Urobili: 2 mg/dL   Blood: x / Protein: Negative / Nitrite: Negative   Leuk Esterase: Negative / RBC: x / WBC x   Sq Epi: x / Non Sq Epi: x / Bacteria: x    Imaging:    < from: US Abdomen Complete (19 @ 12:02) >  IMPRESSION:     Mildly nodular liver edge, consistent with cirrhosis.    < end of copied text >

## 2019-01-24 NOTE — PROGRESS NOTE ADULT - SUBJECTIVE AND OBJECTIVE BOX
Patient is a 53y old  Female who presents with a chief complaint of chest pain (2019 11:49)      SUBJECTIVE / OVERNIGHT EVENTS:  No chest pain. No shortness of breath. No complaints. No events overnight.     MEDICATIONS  (STANDING):  atorvastatin 40 milliGRAM(s) Oral at bedtime  carvedilol 3.125 milliGRAM(s) Oral every 12 hours  cefuroxime  IVPB 1500 milliGRAM(s) IV Intermittent once  chlorhexidine 0.12% Liquid 30 milliLiter(s) Swish and Spit once  chlorhexidine 4% Liquid 1 Application(s) Topical once  heparin  Infusion. 900 Unit(s)/Hr (9 mL/Hr) IV Continuous <Continuous>  influenza   Vaccine 0.5 milliLiter(s) IntraMuscular once  isosorbide   mononitrate ER Tablet (IMDUR) 30 milliGRAM(s) Oral daily    MEDICATIONS  (PRN):  benzonatate 100 milliGRAM(s) Oral three times a day PRN Cough  heparin  Injectable 6500 Unit(s) IV Push every 6 hours PRN For aPTT less than 40  heparin  Injectable 3000 Unit(s) IV Push every 6 hours PRN For aPTT between 40 - 57  LORazepam     Tablet 0.5 milliGRAM(s) Oral at bedtime PRN insomnia      Vital Signs Last 24 Hrs  T(C): 37 (2019 09:00), Max: 37 (2019 13:32)  T(F): 98.6 (2019 09:00), Max: 98.6 (2019 13:32)  HR: 74 (2019 12:24) (71 - 84)  BP: 151/82 (2019 12:24) (142/90 - 162/98)  BP(mean): --  RR: 18 (2019 10:38) (18 - 18)  SpO2: 97% (2019 10:38) (97% - 100%)  CAPILLARY BLOOD GLUCOSE        I&O's Summary    2019 07:01  -  2019 07:00  --------------------------------------------------------  IN: 708 mL / OUT: 300 mL / NET: 408 mL        PHYSICAL EXAM:  GENERAL: NAD, well-developed  HEAD:  Atraumatic, Normocephalic  EYES: EOMI, PERRLA, conjunctiva and sclera clear  NECK: Supple, No JVD  CHEST/LUNG: Clear to auscultation bilaterally; No wheeze  HEART: Regular rate and rhythm; murmurs LSB, no rubs, or gallops  ABDOMEN: Soft, Nontender, Nondistended; Bowel sounds present  EXTREMITIES:  2+ Peripheral Pulses, No clubbing, cyanosis, or edema  PSYCH: AAOx3  NEUROLOGY: non-focal  SKIN: No rashes or lesions    LABS:                        13.8   7.6   )-----------( 246      ( 2019 06:18 )             40.7         142  |  105  |  12  ----------------------------<  102<H>  3.6   |  24  |  0.59    Ca    9.4      2019 05:35  Mg     1.9         TPro  7.5  /  Alb  4.0  /  TBili  1.0  /  DBili  x   /  AST  19  /  ALT  18  /  AlkPhos  93      PTT - ( 2019 06:18 )  PTT:75.2 sec      Urinalysis Basic - ( 2019 18:43 )    Color: Light Yellow / Appearance: Clear / S.014 / pH: x  Gluc: x / Ketone: Negative  / Bili: Negative / Urobili: 2 mg/dL   Blood: x / Protein: Negative / Nitrite: Negative   Leuk Esterase: Negative / RBC: x / WBC x   Sq Epi: x / Non Sq Epi: x / Bacteria: x        RADIOLOGY & ADDITIONAL TESTS:    Imaging Personally Reviewed:    Consultant(s) Notes Reviewed:      Care Discussed with Consultants/Other Providers:

## 2019-01-24 NOTE — PROGRESS NOTE ADULT - ASSESSMENT
This is a  53F traveler from  [facility] hx afib not on AC, htn p/w chest pain. Endorses a  hospitalization for her heart during childhood..  Presented to [facility] on 1/20/19  with c/o  midsternal burning chest pain started a few days ago, constant, gradually worsening worse with cough/deep breath and endorses slight shortness of breath. Radiates to R shoulder.  Non exertional/non positional. Denies leg swelling, but notes that she recently traveled  on flight from [facility] yesterday (6hr flight). Also notes recent fevers/cough began approximately 1-2 weeks ago. No history of clots, no recent procedures/hospitalizations .  Chest Ct reveals LLL pulmonary nodule  1.6cm  and cirrhotic liver.  Abdominal US mildly nodular liver edge consistent with cirrhosis. Hepatology  assessment  Jarek class A no contraindication to surgery  Echocardiogram revels  donning and thickening of mitral valve consistent with RHD severe Mitral regurgitation moderate stenosis with  moderate to severe pulmonary pressures. On heparin gtt for Afib scheduled  for MVR in am.

## 2019-01-24 NOTE — CONSULT NOTE ADULT - SUBJECTIVE AND OBJECTIVE BOX
Thoracic Surgery Consult Note  Spectra [id number]    HPI:  53-year-old woman with afib not on AC presented with midsternal chest pain s/p LHC with clean vessels found to have severe mitral regurgitation with a cirrhotic liver and a 1.6cm left lower lobe pulmonary nodule. Patient to undergo mitral valve surgery cardiac surgery on . Thoracic surgery consulted for possible intervention for pulmonary nodule.   Denies cough, shortness of breath. Never smoker.   No personal history of cancer. Never had a colonoscopy before.   S/p hysterectomy for fibroids.    PAST MEDICAL & SURGICAL HISTORY:  HTN (hypertension)  Afib    ALLERGIES:  NKA    	  HOME MEDICATIONS:  Home Medications:  Ativan 2 mg oral tablet: 0.25 tab(s) orally once a day (at bedtime), As Needed (2019 15:15)  Coreg 3.125 mg oral tablet: 1 tab(s) orally once a day    NOTE: PATIENT HAS NOT STARTED MED YET (2019 15:15)  isosorbide mononitrate 30 mg oral tablet, extended release: 1 tab(s) orally once a day (in the morning) (2019 15:15)  Lasix 40 mg oral tablet: 1 tab(s) orally once a day    NOTE: PATIENT HAS NOT STARTED MED YET (2019 15:15)  losartan 50 mg oral tablet: 0.5 tab(s) orally every 12 hours (2019 15:15)      MEDICATIONS  (STANDING):  atorvastatin 80 milliGRAM(s) Oral at bedtime  carvedilol 3.125 milliGRAM(s) Oral every 12 hours  heparin  Infusion. 900 Unit(s)/Hr (9 mL/Hr) IV Continuous <Continuous>  influenza   Vaccine 0.5 milliLiter(s) IntraMuscular once  isosorbide   mononitrate ER Tablet (IMDUR) 30 milliGRAM(s) Oral daily  losartan 25 milliGRAM(s) Oral two times a day      SOCIAL HISTORY:  Denies smoking and ETOH use.     FAMILY HISTORY:  No pertinent history in first degree relatives.  ___________________________________________  REVIEW OF SYSTEMS:  Constitutional: No fevers, chills, no recent weight loss  ENMT: No changes in hearing, no changes in vision, no sore throat, no cough  Respiratory: No shortness of breath  Cardiovascular: No chest pain, palpitations  Gastrointestinal: No abdominal pain, no diarrhea/constipation  Genitourinary: No dysuria, frequency, or urgency    Extremities: No joint swelling, no limited range of movement  Neurological: No paresthesia  Skin: No rashes  ___________________________________________  PHYSICAL EXAM:  Vital Signs Last 24 Hrs  T(C): 36.4 (2019 21:15), Max: 37.1 (2019 05:00)  T(F): 97.5 (2019 21:15), Max: 98.8 (2019 05:00)  HR: 77 (2019 22:45) (66 - 93)  BP: 142/90 (2019 22:45) (130/84 - 162/98)  BP(mean): --  RR: 18 (2019 21:15) (18 - 18)  SpO2: 98% (2019 21:15) (98% - 100%)CAPILLARY BLOOD GLUCOSE        I&O's Detail    2019 07:01  -  2019 07:00  --------------------------------------------------------  IN:    heparin  Infusion.: 81 mL  Total IN: 81 mL    OUT:  Total OUT: 0 mL    Total NET: 81 mL      2019 07:01  -  2019 00:55  --------------------------------------------------------  IN:    heparin  Infusion.: 108 mL    Oral Fluid: 600 mL  Total IN: 708 mL    OUT:    Voided: 300 mL  Total OUT: 300 mL    Total NET: 408 mL        General: A&Ox3, NAD.  Neuro: Motor and sensory grossly intact with no focal deficits.  HEENT: Anicteric sclerae.  Respiratory: Unlabored breathing.   CVS: Regular rate and rhythm.  Abdomen: Soft, non-distended, non-tender.   Extremities: Warm bilaterally w/ palpable pulses.   MSK: Intact ROM.  ____________________________________________  LABS:  CBC Full  -  ( 2019 05:35 )  WBC Count : 6.5 K/uL  Hemoglobin : 13.6 g/dL  Hematocrit : 40.4 %  Platelet Count - Automated : 215 K/uL  Mean Cell Volume : 88.9 fl  Mean Cell Hemoglobin : 30.0 pg  Mean Cell Hemoglobin Concentration : 33.7 gm/dL        142  |  105  |  12  ----------------------------<  102<H>  3.6   |  24  |  0.59    Ca    9.4      2019 05:35  Mg     1.9         TPro  7.5  /  Alb  4.0  /  TBili  1.0  /  DBili  x   /  AST  19  /  ALT  18  /  AlkPhos  93      LIVER FUNCTIONS - ( 2019 05:35 )  Alb: 4.0 g/dL / Pro: 7.5 g/dL / ALK PHOS: 93 U/L / ALT: 18 U/L / AST: 19 U/L / GGT: x           PT/INR - ( 2019 09:56 )   PT: 15.2 sec;   INR: 1.31 ratio         PTT - ( 2019 18:43 )  PTT:67.5 sec  Urinalysis Basic - ( 2019 18:43 )    Color: Light Yellow / Appearance: Clear / S.014 / pH: x  Gluc: x / Ketone: Negative  / Bili: Negative / Urobili: 2 mg/dL   Blood: x / Protein: Negative / Nitrite: Negative   Leuk Esterase: Negative / RBC: x / WBC x   Sq Epi: x / Non Sq Epi: x / Bacteria: x    ____________________________________________  RADIOLOGY:  CT Chest No Cont (19 @ 12:38)   LUNGS AND LARGE AIRWAYS: Patent central airways. Indeterminate 1.6 cm   nodule in the superior segment of the left lower lobe. A 5 mm left apical   nodule on image 17. The lungs demonstrate areas of mosaic attenuation   bilaterally.  PLEURA: No pleural effusion.  VESSELS: The main pulmonary artery is enlarged up to 4.5 cm.  HEART: Massive cardiomegaly. Trace pericardial fluid.      MEDIASTINUM AND ASHLI: No lymphadenopathy.   CHEST WALL AND LOWER NECK: Within normal limits.   BONES: Within normal limits.     Cirrhotic liver.    IMPRESSION:   Indeterminate 1.6 cm left lower lobe pulmonary nodule; suggest one-month   follow-up chest CT.    Mosaic attenuation may reflect pulmonary hypertension.    Massivecardiomegaly.    Cirrhotic liver.

## 2019-01-24 NOTE — CONSULT NOTE ADULT - ATTENDING COMMENTS
Patient seen and examined, data reviewed.  CT Chest personally reviewed by me and discussed with Dr. Dennis Arriaga.  Patient reports 3-4 day history of cough, dyspnea, fever chills and hemoptysis which prompted her visit to a hospital in [facility] where she was noted to be in atrial fibrilation.  She denies history of tobacco exposure.  No prior CXRs or CTs to review.  Asked to evaluate regarding 1.6cm LLL pulmonary nodule and 5 mm nodule in RUL anteriorly.  I am concernced about the LLL nodule which is large and could represent a malignant process, atypical infectious process or inflammatory focus.  The upper lobe  nodule in the right could be inflammatory related to an osteophyte.  Given that she appears hemodynamically stable would advise CT guided biopsy of lung lesion in the LLL with presence of cytology to review specimen and material sent for culture prior to proceeding with surgery.
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  Abdominal sonogram notable for nodular liver surface concerning for possible cardiac cirrhosis (though this appearance is neither sensitive nor specific in the setting of congestion).   No prior history of hepatic decompensations (no ascites, no hepatic encephalopathy, no variceal hemorrhage).  Liver synthetic function appears preserved, and alkaline phosphatase and bilirubin are also within normal limits. Current MELD-Na 9.  Cannot exclude the possibility of compensated (Child-Castro class A) cardiac cirrhosis.   Agree with plan for hepatic venous pressure measurements for further evaluation, to assess for presence of a transhepatic gradient.   Pending these results, will discuss with CT Surgery whether or not to proceed with liver biopsy for further evaluation.
Agree with plan as outlined above with the following exceptions.   Elevated risk factors. Ongoing CP.  Will send for LHC.   Start Atorva 80   Add plavix after cath if necessary  Check TTE  Discussed with family and primary team
Pt seen and examined.  53F with severe MR/MS, pulmonary HTN, right heart dysfunction secondary to rheumatic mitral valve.  Pt scheduled for MVR 1/25.  Pt will need (1) carotid dopplers, (2) thoracic consult for pulm nodule, (3) transhepatic gradient and GI consult to evaluate cirrhosis prior to surgery.

## 2019-01-24 NOTE — PROGRESS NOTE ADULT - SUBJECTIVE AND OBJECTIVE BOX
Patient seen and examined at bedside.    Overnight Events: No complaints this morning. Denies CP, SOB, LE edema.       REVIEW OF SYSTEMS:  Constitutional:     No fevers, chills, weight loss, weight gain  HEENT:                  No dry eyes, nasal congestion, postnasal drip  CV:                         No chest pain, palpitations, orthopnea, PND  Resp:                     No cough, SOB, dyspnea, wheezing, sputum  GI:                          No nausea, vomiting, abdominal pain, diarrhea, constipation  :                        No dysuria, nocturia, hematuria, increased urinary frequency  Musculoskeletal: No back pain, myalgias, arthralgias   Skin:                       No rash, pruritus, ecchymoses  Neurological:        No headache, dizziness, syncope, weakness, numbness  Psychiatric:           No anxiety, depression   Endocrine:            No hot/cold intolerance, polydipsia  Heme/Lymph:      No bleeding, easy bruising  Allergic/Immune: No itchy eyes, rhinorrhea, hives angioedema      Current Meds:  atorvastatin 40 milliGRAM(s) Oral at bedtime  benzonatate 100 milliGRAM(s) Oral three times a day PRN  carvedilol 3.125 milliGRAM(s) Oral every 12 hours  cefuroxime  IVPB 1500 milliGRAM(s) IV Intermittent once  chlorhexidine 0.12% Liquid 30 milliLiter(s) Swish and Spit once  chlorhexidine 4% Liquid 1 Application(s) Topical once  heparin  Infusion. 900 Unit(s)/Hr IV Continuous <Continuous>  heparin  Injectable 6500 Unit(s) IV Push every 6 hours PRN  heparin  Injectable 3000 Unit(s) IV Push every 6 hours PRN  influenza   Vaccine 0.5 milliLiter(s) IntraMuscular once  isosorbide   mononitrate ER Tablet (IMDUR) 30 milliGRAM(s) Oral daily  LORazepam     Tablet 0.5 milliGRAM(s) Oral at bedtime PRN      PAST MEDICAL & SURGICAL HISTORY:  HTN (hypertension)  Afib  No significant past surgical history      Vitals:  T(F): 98.6 (01-24), Max: 98.6 (01-23)  HR: 74 (01-24) (71 - 84)  BP: 147/91 (01-24) (142/90 - 162/98)  RR: 18 (01-24)  SpO2: 97% (01-24)  I&O's Summary    23 Jan 2019 07:01  -  24 Jan 2019 07:00  --------------------------------------------------------  IN: 708 mL / OUT: 300 mL / NET: 408 mL        Physical Exam:  Appearance: No acute distress; well appearing, obese  Eyes: PERRL, EOMI, pink conjunctiva  HENT: Normal oral mucosa  Cardiovascular: irregularly irregular S1, S2, systolic and diastolic murmurs, +JVD  Respiratory: diminished in bases  Gastrointestinal: soft, non-tender, non-distended with normal bowel sounds  Musculoskeletal: No clubbing; no joint deformity   Neurologic: Non-focal  Lymphatic: No lymphadenopathy  Psychiatry: AAOx3, mood & affect appropriate  Skin: No rashes, ecchymoses, or cyanosis                          13.8   7.6   )-----------( 246      ( 24 Jan 2019 06:18 )             40.7     01-23    142  |  105  |  12  ----------------------------<  102<H>  3.6   |  24  |  0.59    Ca    9.4      23 Jan 2019 05:35  Mg     1.9     01-23    TPro  7.5  /  Alb  4.0  /  TBili  1.0  /  DBili  x   /  AST  19  /  ALT  18  /  AlkPhos  93  01-23    PTT - ( 24 Jan 2019 06:18 )  PTT:75.2 sec      Serum Pro-Brain Natriuretic Peptide: 815 pg/mL (01-23 @ 18:43)      Hemoglobin A1C, Whole Blood: 5.8 % (01-23 @ 23:14)      New ECG(s): Personally reviewed    Echo:    Stress Testing:     Cath:    Imaging:    Interpretation of Telemetry: AFib, 2-4 sec pauses

## 2019-01-24 NOTE — PROGRESS NOTE ADULT - SUBJECTIVE AND OBJECTIVE BOX
Cardiac Surgery Pre-op Note:    CC: Patient is a 53y old  Female in  afib  who presents with a chief complaint of chest pain underwent echocardiogram found to have severe MR now schedule for MVR in am      Referring Physician: Dr Watkins  Surgeon: Dr Bellamy  Procedure: MVR     No Known Allergies  Intolerances        HPI:  53F [place] hx afib not on AC, htn p/w chest pain. Patient reports midsternal burning chest pain started a few days ago, constant, gradually worsening worse with cough/deep breath and endorses slight shortness of breath. Radiates to R shoulder. Non exertional/non positional. Denies leg swelling, but notes that she returned on flight from [facility] yesterday (6hr flight). Also notes recent fevers/cough began approximately 1-2 weeks ago. No history of clots, no recent procedures/hospitalizations. (2019 17:19)      PAST MEDICAL & SURGICAL HISTORY:  HTN (hypertension)  Afib  No significant past surgical history      MEDICATIONS  (STANDING):  atorvastatin 40 milliGRAM(s) Oral at bedtime  carvedilol 3.125 milliGRAM(s) Oral every 12 hours  cefuroxime  IVPB 1500 milliGRAM(s) IV Intermittent once  chlorhexidine 0.12% Liquid 30 milliLiter(s) Swish and Spit once  chlorhexidine 4% Liquid 1 Application(s) Topical once  heparin  Infusion. 900 Unit(s)/Hr (9 mL/Hr) IV Continuous <Continuous>  influenza   Vaccine 0.5 milliLiter(s) IntraMuscular once  isosorbide   mononitrate ER Tablet (IMDUR) 30 milliGRAM(s) Oral daily    MEDICATIONS  (PRN):  benzonatate 100 milliGRAM(s) Oral three times a day PRN Cough  heparin  Injectable 6500 Unit(s) IV Push every 6 hours PRN For aPTT less than 40  heparin  Injectable 3000 Unit(s) IV Push every 6 hours PRN For aPTT between 40 - 57  LORazepam     Tablet 0.5 milliGRAM(s) Oral at bedtime PRN insomniaICU Vital Signs Last 24 Hrs    T(C): 36.7 (2019 15:43), Max: 37 (2019 09:00)  T(F): 98.1 (2019 15:43), Max: 98.6 (2019 09:00)  HR: 81 (2019 15:43) (74 - 84)  BP: 138/87 (2019 15:43) (133/105 - 162/98)  RR: 20 (2019 15:43) (16 - 20)  SpO2: 95% (2019 15:43) (94% - 98%)  weight 81.6 kg    Labs:                        13.8   7.6   )-----------( 246      ( 2019 06:18 )             40.7         142  |  105  |  12  ----------------------------<  102<H>  3.6   |  24  |  0.59    Ca    9.4      2019 05:35  Mg     1.9         TPro  7.5  /  Alb  4.0  /  TBili  1.0  /  DBili  x   /  AST  19  /  ALT  18  /  AlkPhos  93      PTT - ( 2019 06:18 )  PTT:75.2 sec    Blood Type: ABO Interpretation: O ( @ 19:13)    HGB A1C: Hemoglobin A1C, Whole Blood: 5.8 % ( @ 23:14)    Prealbumin:   Pro-BNP: Serum Pro-Brain Natriuretic Peptide: 815 pg/mL ( @ 18:43)    Thyroid Panel:  @ 08:39/2.85  --/--/--    MRSA:  / MSSA:   Urinalysis Basic - ( 2019 18:43 )    Color: Light Yellow / Appearance: Clear / S.014 / pH: x  Gluc: x / Ketone: Negative  / Bili: Negative / Urobili: 2 mg/dL   Blood: x / Protein: Negative / Nitrite: Negative   Leuk Esterase: Negative / RBC: x / WBC x   Sq Epi: x / Non Sq Epi: x / Bacteria: x        CXR: < from: Xray Chest 2 Views PA/Lat (19 @ 11:34) >  Cardiomegaly.  Retrocardiac opacity in the left lung, which can represent atelectasis or   pneumonia.  EKG:< from: 12 Lead ECG (19 @ 10:30) >   ATRIAL FIBRILLATION  SEPTAL INFARCT , AGE UNDETERMINED  ABNORMALECG      Carotid Duplex: < from: VA Duplex Carotid, Bilat (19 @ 20:32) >  no hemodynamically significant stenosis seen within the bilateral   internal carotid arteries.    Mild stenosis left external carotid artery.  PFT's:    Echocardiogram:< from: Transthoracic Echocardiogram (19 @ 07:32) >  . Doming and thickening of the tips of the mitral valve  consistent with rheumatic mitral valve disease. Severe  mitral regurgitation. Mean transmitral valve gradient  equals 8 mm Hg, consistent with moderate mitral stenosis.  2. Severely dilated left atrium.  LA volume index = 142  cc/m2.  3. Moderate concentric left ventricular hypertrophy.  4. Normal left ventricular systolic function , with  paradoxical septal motion consistent with right heart  overload. No segmental wall motion abnormalities.  5. Normal right ventricular size with decreased right  ventricular systolic function.  6. Estimated pulmonary artery systolic pressure equals 55  mm Hg, assuming right atrial pressure equals 8 mm Hg,  consistent with moderate to severe pulmonary pressures.        Cardiac catheterization:< from: Cardiac Cath Lab - Adult (19 @ 20:52) >  CORONARY VESSELS: The coronary circulation is right dominant.  LM:   --  LM: Normal.  LAD:   --  LAD: Angiography showed minor luminal irregularities with no  flow limiting lesions.  --  D1: Normal.  CX:   --  Circumflex: Angiography showed minor luminal irregularities with  no flow limiting lesions.  --  OM1: Angiography showed minor luminal irregularities with no flow  limiting lesions.  RCA:   --  Proximal RCA: Angiography showed minor luminal irregularities  with no flow limiting lesions.  COMPLICATIONS: There were no complications.  DIAGNOSTIC IMPRESSIONS: Enlarged cardiac silhouette. Minor CAD.      Gen: WN/WD NAD  Neuro: AAOx3, nonfocal  Pulm: CTA B/L  CV: IRRR, S1S2  Abd: Soft, NT, ND +BS  Ext: No edema, + peripheral pulses      Pt has AICD/PPM [ ] Yes  [x ] No             Brand Name:  Pre-op Beta Blocker ordered within 24 hrs of surgery ([facility] ONLY)?  [ ] Yes  [ ] No  If not, Why?  Type & Cross  [ x] Yes  [ ] No  NPO after Midnight [x ] Yes  [ ] No  Pre-op ABX ordered, to be taped on chart:  [x ] Yes  [ ] No     Hibiclens/Peridex ordered [ x] Yes  [ ] No  Intraop on Hold: PRBCs, CXR, ABBY [ x]   Consent obtained  [ ] Yes  xNo

## 2019-01-24 NOTE — PROGRESS NOTE ADULT - ASSESSMENT
53F [place]rit[place] hx afib not on AC, htn p/w chest pain. Patient reports midsternal burning chest pain started a few days ago, constant, gradually worsening worse with cough/deep breath and endorses slight shortness of breath. Radiates to R shoulder. Non exertional/non positional. Denies leg swelling, but notes that she returned on flight from [facility] yesterday (6hr flight). Also notes recent fevers/cough began approximately 1-2 weeks ago. No history of clots, no recent procedures/hospitalizations.    Sev MR  - RHC  - for valve repair Friday    liver cirrhosis on CT  - liver sono done  - hepatology to see pt    Afib /  HTN  - cw coreg  - on a/c iv heparin    PULM nodule  - seen by pulm and CT surg  - PET scan as out pt    d/c planning if cleared by cardiology.

## 2019-01-24 NOTE — PROGRESS NOTE ADULT - PROBLEM SELECTOR PLAN 1
NPO at midnight  heparin gtt on call to OR  [facility]  Peridex rinse in   MVR in am

## 2019-01-24 NOTE — CONSULT NOTE ADULT - ASSESSMENT
Impression:  # Cirrhotic Appearing Liver; Blood work not consistent with cirrhosis given normal albumin, bilirubin and platelets - however unable to rule out cirrhosis secondary to significant cardiac disease. If patient does have cirrhosis, would be a Child Class A (5 points). Given Class A, unclear if transhepatic pressures/biopsies to confirm cirrhosis would  at this time  # Afib (not on AC)  # Pulmonary Nodule: Pulm recommending outpatient followup with PET and PFTs  # Hypertension    Recommendations:  - Will discuss with CT surgery team regarding transhepatic pressures  - Supportive care per primary team    Lidia Villareal MD  Gastroenterology Fellow  [phone]  Please page on call fellow on weekends and after 5pm on weekdays

## 2019-01-24 NOTE — CONSULT NOTE ADULT - ASSESSMENT
Assessment/Plan: 53y Female with a 1.6cm LLL nodule.   No smoking history or personal history of cancer. Assessment/Plan: 53y Female with a 1.6cm LLL nodule.   No smoking history or personal history of cancer.     - Will need outpatient followup and workup, including PET and PFTs before attempting resection  - No urgent surgical intervention at this time    Discussed with Dr. Kamille Kelly [phone]

## 2019-01-24 NOTE — PROGRESS NOTE ADULT - ASSESSMENT
***Note in progress 53 [place] female with Afib, HTN who presented with chest pain. LHC with clean coronaries. TTE with reduced EF, severe MR and mod MS.     #HFrEF  - LHC with clean cors  - cont Coreg, okay to hold ARB before surgery    #Severe MR  #Mod MS  - LA severely dilated  - CT surgery consulted, appreciate recs    AFib   - CHADS-VASc 2.  - cont coreg  - hep darrel Maloney  Cardiology Fellow  [id number]

## 2019-01-25 ENCOUNTER — APPOINTMENT (OUTPATIENT)
Dept: CARDIOTHORACIC SURGERY | Facility: HOSPITAL | Age: 54
End: 2019-01-25

## 2019-01-25 ENCOUNTER — RESULT REVIEW (OUTPATIENT)
Age: 54
End: 2019-01-25

## 2019-01-25 PROBLEM — I10 ESSENTIAL (PRIMARY) HYPERTENSION: Chronic | Status: ACTIVE | Noted: 2019-01-20

## 2019-01-25 PROBLEM — Z00.00 ENCOUNTER FOR PREVENTIVE HEALTH EXAMINATION: Status: ACTIVE | Noted: 2019-01-25

## 2019-01-25 PROBLEM — I48.91 UNSPECIFIED ATRIAL FIBRILLATION: Chronic | Status: ACTIVE | Noted: 2019-01-20

## 2019-01-25 LAB
ALBUMIN SERPL ELPH-MCNC: 3.3 G/DL — SIGNIFICANT CHANGE UP (ref 3.3–5)
ALP SERPL-CCNC: 59 U/L — SIGNIFICANT CHANGE UP (ref 40–120)
ALT FLD-CCNC: 17 U/L — SIGNIFICANT CHANGE UP (ref 10–45)
ANION GAP SERPL CALC-SCNC: 14 MMOL/L — SIGNIFICANT CHANGE UP (ref 5–17)
ANION GAP SERPL CALC-SCNC: 14 MMOL/L — SIGNIFICANT CHANGE UP (ref 5–17)
APTT BLD: 34.8 SEC — SIGNIFICANT CHANGE UP (ref 27.5–36.3)
APTT BLD: 59.4 SEC — HIGH (ref 27.5–36.3)
AST SERPL-CCNC: 33 U/L — SIGNIFICANT CHANGE UP (ref 10–40)
BASE EXCESS BLDMV CALC-SCNC: 0.2 MMOL/L — SIGNIFICANT CHANGE UP (ref -3–3)
BASE EXCESS BLDMV CALC-SCNC: 0.2 MMOL/L — SIGNIFICANT CHANGE UP (ref -3–3)
BASE EXCESS BLDMV CALC-SCNC: 1.2 MMOL/L — SIGNIFICANT CHANGE UP (ref -3–3)
BASE EXCESS BLDMV CALC-SCNC: 1.5 MMOL/L — SIGNIFICANT CHANGE UP (ref -3–3)
BASE EXCESS BLDMV CALC-SCNC: 1.6 MMOL/L — SIGNIFICANT CHANGE UP (ref -3–3)
BASOPHILS # BLD AUTO: 0 K/UL — SIGNIFICANT CHANGE UP (ref 0–0.2)
BILIRUB SERPL-MCNC: 1.4 MG/DL — HIGH (ref 0.2–1.2)
BLD GP AB SCN SERPL QL: NEGATIVE — SIGNIFICANT CHANGE UP
BUN SERPL-MCNC: 12 MG/DL — SIGNIFICANT CHANGE UP (ref 7–23)
BUN SERPL-MCNC: 9 MG/DL — SIGNIFICANT CHANGE UP (ref 7–23)
CALCIUM SERPL-MCNC: 8.5 MG/DL — SIGNIFICANT CHANGE UP (ref 8.4–10.5)
CALCIUM SERPL-MCNC: 9.5 MG/DL — SIGNIFICANT CHANGE UP (ref 8.4–10.5)
CHLORIDE SERPL-SCNC: 104 MMOL/L — SIGNIFICANT CHANGE UP (ref 96–108)
CHLORIDE SERPL-SCNC: 108 MMOL/L — SIGNIFICANT CHANGE UP (ref 96–108)
CK MB CFR SERPL CALC: 20.2 NG/ML — HIGH (ref 0–3.8)
CK SERPL-CCNC: 253 U/L — HIGH (ref 25–170)
CO2 BLDMV-SCNC: 26 MMOL/L — SIGNIFICANT CHANGE UP (ref 21–29)
CO2 BLDMV-SCNC: 28 MMOL/L — SIGNIFICANT CHANGE UP (ref 21–29)
CO2 BLDMV-SCNC: 28 MMOL/L — SIGNIFICANT CHANGE UP (ref 21–29)
CO2 SERPL-SCNC: 22 MMOL/L — SIGNIFICANT CHANGE UP (ref 22–31)
CO2 SERPL-SCNC: 26 MMOL/L — SIGNIFICANT CHANGE UP (ref 22–31)
CREAT SERPL-MCNC: 0.65 MG/DL — SIGNIFICANT CHANGE UP (ref 0.5–1.3)
CREAT SERPL-MCNC: 0.79 MG/DL — SIGNIFICANT CHANGE UP (ref 0.5–1.3)
EOSINOPHIL # BLD AUTO: 0.2 K/UL — SIGNIFICANT CHANGE UP (ref 0–0.5)
EOSINOPHIL NFR BLD AUTO: 4 % — SIGNIFICANT CHANGE UP (ref 0–6)
FIBRINOGEN PPP-MCNC: 333 MG/DL — LOW (ref 350–510)
GAS PNL BLDA: SIGNIFICANT CHANGE UP
GAS PNL BLDMV: SIGNIFICANT CHANGE UP
GLUCOSE BLDC GLUCOMTR-MCNC: 109 MG/DL — HIGH (ref 70–99)
GLUCOSE BLDC GLUCOMTR-MCNC: 119 MG/DL — HIGH (ref 70–99)
GLUCOSE BLDC GLUCOMTR-MCNC: 141 MG/DL — HIGH (ref 70–99)
GLUCOSE SERPL-MCNC: 100 MG/DL — HIGH (ref 70–99)
GLUCOSE SERPL-MCNC: 100 MG/DL — HIGH (ref 70–99)
HCG UR QL: NEGATIVE — SIGNIFICANT CHANGE UP
HCO3 BLDMV-SCNC: 25 MMOL/L — SIGNIFICANT CHANGE UP (ref 20–28)
HCO3 BLDMV-SCNC: 26 MMOL/L — SIGNIFICANT CHANGE UP (ref 20–28)
HCO3 BLDMV-SCNC: 27 MMOL/L — SIGNIFICANT CHANGE UP (ref 20–28)
HCT VFR BLD CALC: 33.9 % — LOW (ref 34.5–45)
HCT VFR BLD CALC: 34 % — LOW (ref 34.5–45)
HCT VFR BLD CALC: 40.2 % — SIGNIFICANT CHANGE UP (ref 34.5–45)
HGB BLD-MCNC: 11.7 G/DL — SIGNIFICANT CHANGE UP (ref 11.5–15.5)
HGB BLD-MCNC: 11.9 G/DL — SIGNIFICANT CHANGE UP (ref 11.5–15.5)
HGB BLD-MCNC: 13.3 G/DL — SIGNIFICANT CHANGE UP (ref 11.5–15.5)
HOROWITZ INDEX BLDMV+IHG-RTO: 100 — SIGNIFICANT CHANGE UP
HOROWITZ INDEX BLDMV+IHG-RTO: 40 — SIGNIFICANT CHANGE UP
INR BLD: 1.68 RATIO — HIGH (ref 0.88–1.16)
LYMPHOCYTES # BLD AUTO: 17 % — SIGNIFICANT CHANGE UP (ref 13–44)
LYMPHOCYTES # BLD AUTO: 2.4 K/UL — SIGNIFICANT CHANGE UP (ref 1–3.3)
MAGNESIUM SERPL-MCNC: 3 MG/DL — HIGH (ref 1.6–2.6)
MCHC RBC-ENTMCNC: 29.6 PG — SIGNIFICANT CHANGE UP (ref 27–34)
MCHC RBC-ENTMCNC: 31 PG — SIGNIFICANT CHANGE UP (ref 27–34)
MCHC RBC-ENTMCNC: 31.3 PG — SIGNIFICANT CHANGE UP (ref 27–34)
MCHC RBC-ENTMCNC: 33.1 GM/DL — SIGNIFICANT CHANGE UP (ref 32–36)
MCHC RBC-ENTMCNC: 34.5 GM/DL — SIGNIFICANT CHANGE UP (ref 32–36)
MCHC RBC-ENTMCNC: 35 GM/DL — SIGNIFICANT CHANGE UP (ref 32–36)
MCV RBC AUTO: 89.3 FL — SIGNIFICANT CHANGE UP (ref 80–100)
MCV RBC AUTO: 89.5 FL — SIGNIFICANT CHANGE UP (ref 80–100)
MCV RBC AUTO: 89.8 FL — SIGNIFICANT CHANGE UP (ref 80–100)
MONOCYTES # BLD AUTO: 0.4 K/UL — SIGNIFICANT CHANGE UP (ref 0–0.9)
MONOCYTES NFR BLD AUTO: 3 % — SIGNIFICANT CHANGE UP (ref 2–14)
NEUTROPHILS # BLD AUTO: 8.9 K/UL — HIGH (ref 1.8–7.4)
NEUTROPHILS NFR BLD AUTO: 71 % — SIGNIFICANT CHANGE UP (ref 43–77)
O2 CT VFR BLD CALC: 39 MMHG — SIGNIFICANT CHANGE UP (ref 30–65)
O2 CT VFR BLD CALC: 43 MMHG — SIGNIFICANT CHANGE UP (ref 30–65)
O2 CT VFR BLD CALC: 44 MMHG — SIGNIFICANT CHANGE UP (ref 30–65)
O2 CT VFR BLD CALC: 45 MMHG — SIGNIFICANT CHANGE UP (ref 30–65)
O2 CT VFR BLD CALC: 53 MMHG — SIGNIFICANT CHANGE UP (ref 30–65)
PCO2 BLDMV: 39 MMHG — SIGNIFICANT CHANGE UP (ref 30–65)
PCO2 BLDMV: 44 MMHG — SIGNIFICANT CHANGE UP (ref 30–65)
PCO2 BLDMV: 44 MMHG — SIGNIFICANT CHANGE UP (ref 30–65)
PCO2 BLDMV: 45 MMHG — SIGNIFICANT CHANGE UP (ref 30–65)
PCO2 BLDMV: 47 MMHG — SIGNIFICANT CHANGE UP (ref 30–65)
PH BLDMV: 7.36 — SIGNIFICANT CHANGE UP (ref 7.32–7.45)
PH BLDMV: 7.37 — SIGNIFICANT CHANGE UP (ref 7.32–7.45)
PH BLDMV: 7.38 — SIGNIFICANT CHANGE UP (ref 7.32–7.45)
PH BLDMV: 7.39 — SIGNIFICANT CHANGE UP (ref 7.32–7.45)
PH BLDMV: 7.42 — SIGNIFICANT CHANGE UP (ref 7.32–7.45)
PHOSPHATE SERPL-MCNC: 2.2 MG/DL — LOW (ref 2.5–4.5)
PLATELET # BLD AUTO: 159 K/UL — SIGNIFICANT CHANGE UP (ref 150–400)
PLATELET # BLD AUTO: 229 K/UL — SIGNIFICANT CHANGE UP (ref 150–400)
PLATELET # BLD AUTO: ABNORMAL (ref 150–400)
POTASSIUM SERPL-MCNC: 3.6 MMOL/L — SIGNIFICANT CHANGE UP (ref 3.5–5.3)
POTASSIUM SERPL-MCNC: 3.8 MMOL/L — SIGNIFICANT CHANGE UP (ref 3.5–5.3)
POTASSIUM SERPL-SCNC: 3.6 MMOL/L — SIGNIFICANT CHANGE UP (ref 3.5–5.3)
POTASSIUM SERPL-SCNC: 3.8 MMOL/L — SIGNIFICANT CHANGE UP (ref 3.5–5.3)
PROT SERPL-MCNC: 5.4 G/DL — LOW (ref 6–8.3)
PROTHROM AB SERPL-ACNC: 19.5 SEC — HIGH (ref 10–12.9)
RBC # BLD: 3.77 M/UL — LOW (ref 3.8–5.2)
RBC # BLD: 3.81 M/UL — SIGNIFICANT CHANGE UP (ref 3.8–5.2)
RBC # BLD: 4.49 M/UL — SIGNIFICANT CHANGE UP (ref 3.8–5.2)
RBC # FLD: 13.4 % — SIGNIFICANT CHANGE UP (ref 10.3–14.5)
RBC # FLD: 13.6 % — SIGNIFICANT CHANGE UP (ref 10.3–14.5)
RBC # FLD: 14.9 % — HIGH (ref 10.3–14.5)
RH IG SCN BLD-IMP: POSITIVE — SIGNIFICANT CHANGE UP
SAO2 % BLDMV: 66 % — SIGNIFICANT CHANGE UP (ref 60–90)
SAO2 % BLDMV: 73 % — SIGNIFICANT CHANGE UP (ref 60–90)
SAO2 % BLDMV: 75 % — SIGNIFICANT CHANGE UP (ref 60–90)
SAO2 % BLDMV: 76 % — SIGNIFICANT CHANGE UP (ref 60–90)
SAO2 % BLDMV: 86 % — SIGNIFICANT CHANGE UP (ref 60–90)
SODIUM SERPL-SCNC: 144 MMOL/L — SIGNIFICANT CHANGE UP (ref 135–145)
SODIUM SERPL-SCNC: 144 MMOL/L — SIGNIFICANT CHANGE UP (ref 135–145)
TROPONIN T, HIGH SENSITIVITY RESULT: 342 NG/L — HIGH (ref 0–51)
WBC # BLD: 11.1 K/UL — HIGH (ref 3.8–10.5)
WBC # BLD: 13.5 K/UL — HIGH (ref 3.8–10.5)
WBC # BLD: 6.36 K/UL — SIGNIFICANT CHANGE UP (ref 3.8–10.5)
WBC # FLD AUTO: 11.1 K/UL — HIGH (ref 3.8–10.5)
WBC # FLD AUTO: 13.5 K/UL — HIGH (ref 3.8–10.5)
WBC # FLD AUTO: 6.36 K/UL — SIGNIFICANT CHANGE UP (ref 3.8–10.5)

## 2019-01-25 PROCEDURE — 71045 X-RAY EXAM CHEST 1 VIEW: CPT | Mod: 26

## 2019-01-25 PROCEDURE — 99291 CRITICAL CARE FIRST HOUR: CPT

## 2019-01-25 PROCEDURE — 33430 REPLACEMENT OF MITRAL VALVE: CPT

## 2019-01-25 PROCEDURE — 88305 TISSUE EXAM BY PATHOLOGIST: CPT | Mod: 26

## 2019-01-25 PROCEDURE — 99233 SBSQ HOSP IP/OBS HIGH 50: CPT

## 2019-01-25 PROCEDURE — 33641 REPAIR HEART SEPTUM DEFECT: CPT

## 2019-01-25 PROCEDURE — 33463 VALVULOPLASTY TRICUSPID: CPT

## 2019-01-25 PROCEDURE — 99232 SBSQ HOSP IP/OBS MODERATE 35: CPT | Mod: GC

## 2019-01-25 RX ORDER — MILRINONE LACTATE 1 MG/ML
0.25 INJECTION, SOLUTION INTRAVENOUS
Qty: 20 | Refills: 0 | Status: DISCONTINUED | OUTPATIENT
Start: 2019-01-25 | End: 2019-01-26

## 2019-01-25 RX ORDER — POTASSIUM CHLORIDE 20 MEQ
10 PACKET (EA) ORAL
Qty: 0 | Refills: 0 | Status: DISCONTINUED | OUTPATIENT
Start: 2019-01-25 | End: 2019-01-28

## 2019-01-25 RX ORDER — DEXTROSE 50 % IN WATER 50 %
50 SYRINGE (ML) INTRAVENOUS
Qty: 0 | Refills: 0 | Status: DISCONTINUED | OUTPATIENT
Start: 2019-01-25 | End: 2019-01-30

## 2019-01-25 RX ORDER — ALBUMIN HUMAN 25 %
250 VIAL (ML) INTRAVENOUS ONCE
Qty: 0 | Refills: 0 | Status: COMPLETED | OUTPATIENT
Start: 2019-01-25 | End: 2019-01-25

## 2019-01-25 RX ORDER — CHLORHEXIDINE GLUCONATE 213 G/1000ML
5 SOLUTION TOPICAL EVERY 4 HOURS
Qty: 0 | Refills: 0 | Status: DISCONTINUED | OUTPATIENT
Start: 2019-01-25 | End: 2019-01-25

## 2019-01-25 RX ORDER — FENTANYL CITRATE 50 UG/ML
50 INJECTION INTRAVENOUS ONCE
Qty: 0 | Refills: 0 | Status: DISCONTINUED | OUTPATIENT
Start: 2019-01-25 | End: 2019-01-25

## 2019-01-25 RX ORDER — POTASSIUM CHLORIDE 20 MEQ
10 PACKET (EA) ORAL
Qty: 0 | Refills: 0 | Status: COMPLETED | OUTPATIENT
Start: 2019-01-25 | End: 2019-01-26

## 2019-01-25 RX ORDER — POTASSIUM CHLORIDE 20 MEQ
10 PACKET (EA) ORAL
Qty: 0 | Refills: 0 | Status: COMPLETED | OUTPATIENT
Start: 2019-01-25 | End: 2019-01-25

## 2019-01-25 RX ORDER — ACETAMINOPHEN 500 MG
1000 TABLET ORAL ONCE
Qty: 0 | Refills: 0 | Status: COMPLETED | OUTPATIENT
Start: 2019-01-25 | End: 2019-01-25

## 2019-01-25 RX ORDER — SODIUM CHLORIDE 9 MG/ML
250 INJECTION, SOLUTION INTRAVENOUS ONCE
Qty: 0 | Refills: 0 | Status: COMPLETED | OUTPATIENT
Start: 2019-01-25 | End: 2019-01-25

## 2019-01-25 RX ORDER — SODIUM CHLORIDE 9 MG/ML
1000 INJECTION INTRAMUSCULAR; INTRAVENOUS; SUBCUTANEOUS
Qty: 0 | Refills: 0 | Status: DISCONTINUED | OUTPATIENT
Start: 2019-01-25 | End: 2019-01-30

## 2019-01-25 RX ORDER — DEXTROSE 50 % IN WATER 50 %
25 SYRINGE (ML) INTRAVENOUS
Qty: 0 | Refills: 0 | Status: DISCONTINUED | OUTPATIENT
Start: 2019-01-25 | End: 2019-01-28

## 2019-01-25 RX ORDER — INSULIN HUMAN 100 [IU]/ML
3 INJECTION, SOLUTION SUBCUTANEOUS
Qty: 100 | Refills: 0 | Status: DISCONTINUED | OUTPATIENT
Start: 2019-01-25 | End: 2019-01-26

## 2019-01-25 RX ORDER — POTASSIUM PHOSPHATE, MONOBASIC POTASSIUM PHOSPHATE, DIBASIC 236; 224 MG/ML; MG/ML
15 INJECTION, SOLUTION INTRAVENOUS ONCE
Qty: 0 | Refills: 0 | Status: COMPLETED | OUTPATIENT
Start: 2019-01-25 | End: 2019-01-25

## 2019-01-25 RX ORDER — NICARDIPINE HYDROCHLORIDE 30 MG/1
5 CAPSULE, EXTENDED RELEASE ORAL
Qty: 40 | Refills: 0 | Status: DISCONTINUED | OUTPATIENT
Start: 2019-01-25 | End: 2019-01-26

## 2019-01-25 RX ORDER — DOBUTAMINE HCL 250MG/20ML
1.5 VIAL (ML) INTRAVENOUS
Qty: 500 | Refills: 0 | Status: DISCONTINUED | OUTPATIENT
Start: 2019-01-25 | End: 2019-01-26

## 2019-01-25 RX ORDER — METOCLOPRAMIDE HCL 10 MG
10 TABLET ORAL EVERY 8 HOURS
Qty: 0 | Refills: 0 | Status: COMPLETED | OUTPATIENT
Start: 2019-01-25 | End: 2019-01-27

## 2019-01-25 RX ORDER — MEPERIDINE HYDROCHLORIDE 50 MG/ML
25 INJECTION INTRAMUSCULAR; INTRAVENOUS; SUBCUTANEOUS ONCE
Qty: 0 | Refills: 0 | Status: DISCONTINUED | OUTPATIENT
Start: 2019-01-25 | End: 2019-01-26

## 2019-01-25 RX ORDER — CEFUROXIME AXETIL 250 MG
1500 TABLET ORAL EVERY 8 HOURS
Qty: 0 | Refills: 0 | Status: COMPLETED | OUTPATIENT
Start: 2019-01-25 | End: 2019-01-27

## 2019-01-25 RX ORDER — PANTOPRAZOLE SODIUM 20 MG/1
40 TABLET, DELAYED RELEASE ORAL DAILY
Qty: 0 | Refills: 0 | Status: DISCONTINUED | OUTPATIENT
Start: 2019-01-25 | End: 2019-01-26

## 2019-01-25 RX ORDER — DOCUSATE SODIUM 100 MG
100 CAPSULE ORAL THREE TIMES A DAY
Qty: 0 | Refills: 0 | Status: DISCONTINUED | OUTPATIENT
Start: 2019-01-25 | End: 2019-01-30

## 2019-01-25 RX ORDER — WARFARIN SODIUM 2.5 MG/1
5 TABLET ORAL ONCE
Qty: 0 | Refills: 0 | Status: COMPLETED | OUTPATIENT
Start: 2019-01-25 | End: 2019-01-25

## 2019-01-25 RX ADMIN — CHLORHEXIDINE GLUCONATE 5 MILLILITER(S): 213 SOLUTION TOPICAL at 16:20

## 2019-01-25 RX ADMIN — Medication 3.67 MICROGRAM(S)/KG/MIN: at 15:10

## 2019-01-25 RX ADMIN — Medication 10 MILLIGRAM(S): at 23:00

## 2019-01-25 RX ADMIN — Medication 100 MILLIEQUIVALENT(S): at 15:41

## 2019-01-25 RX ADMIN — CHLORHEXIDINE GLUCONATE 30 MILLILITER(S): 213 SOLUTION TOPICAL at 06:28

## 2019-01-25 RX ADMIN — NICARDIPINE HYDROCHLORIDE 25 MG/HR: 30 CAPSULE, EXTENDED RELEASE ORAL at 19:10

## 2019-01-25 RX ADMIN — NICARDIPINE HYDROCHLORIDE 25 MG/HR: 30 CAPSULE, EXTENDED RELEASE ORAL at 14:25

## 2019-01-25 RX ADMIN — Medication 100 MILLIEQUIVALENT(S): at 23:00

## 2019-01-25 RX ADMIN — MILRINONE LACTATE 6.12 MICROGRAM(S)/KG/MIN: 1 INJECTION, SOLUTION INTRAVENOUS at 19:10

## 2019-01-25 RX ADMIN — Medication 100 MILLIEQUIVALENT(S): at 23:56

## 2019-01-25 RX ADMIN — SODIUM CHLORIDE 10 MILLILITER(S): 9 INJECTION INTRAMUSCULAR; INTRAVENOUS; SUBCUTANEOUS at 14:10

## 2019-01-25 RX ADMIN — INSULIN HUMAN 3 UNIT(S)/HR: 100 INJECTION, SOLUTION SUBCUTANEOUS at 19:09

## 2019-01-25 RX ADMIN — WARFARIN SODIUM 5 MILLIGRAM(S): 2.5 TABLET ORAL at 23:55

## 2019-01-25 RX ADMIN — Medication 100 MILLIEQUIVALENT(S): at 15:04

## 2019-01-25 RX ADMIN — FENTANYL CITRATE 50 MICROGRAM(S): 50 INJECTION INTRAVENOUS at 22:27

## 2019-01-25 RX ADMIN — Medication 7.34 MICROGRAM(S)/KG/MIN: at 14:16

## 2019-01-25 RX ADMIN — INSULIN HUMAN 3 UNIT(S)/HR: 100 INJECTION, SOLUTION SUBCUTANEOUS at 17:02

## 2019-01-25 RX ADMIN — Medication 400 MILLIGRAM(S): at 17:18

## 2019-01-25 RX ADMIN — MILRINONE LACTATE 6.12 MICROGRAM(S)/KG/MIN: 1 INJECTION, SOLUTION INTRAVENOUS at 14:33

## 2019-01-25 RX ADMIN — CARVEDILOL PHOSPHATE 3.12 MILLIGRAM(S): 80 CAPSULE, EXTENDED RELEASE ORAL at 06:19

## 2019-01-25 RX ADMIN — Medication 100 MILLIEQUIVALENT(S): at 14:30

## 2019-01-25 RX ADMIN — Medication 125 MILLILITER(S): at 16:19

## 2019-01-25 RX ADMIN — Medication 1000 MILLIGRAM(S): at 17:31

## 2019-01-25 RX ADMIN — Medication 100 MILLIEQUIVALENT(S): at 18:29

## 2019-01-25 RX ADMIN — FENTANYL CITRATE 50 MICROGRAM(S): 50 INJECTION INTRAVENOUS at 22:12

## 2019-01-25 RX ADMIN — PANTOPRAZOLE SODIUM 40 MILLIGRAM(S): 20 TABLET, DELAYED RELEASE ORAL at 16:18

## 2019-01-25 RX ADMIN — Medication 100 MILLIGRAM(S): at 16:18

## 2019-01-25 RX ADMIN — CHLORHEXIDINE GLUCONATE 1 APPLICATION(S): 213 SOLUTION TOPICAL at 04:17

## 2019-01-25 RX ADMIN — POTASSIUM PHOSPHATE, MONOBASIC POTASSIUM PHOSPHATE, DIBASIC 62.5 MILLIMOLE(S): 236; 224 INJECTION, SOLUTION INTRAVENOUS at 16:49

## 2019-01-25 RX ADMIN — Medication 100 MILLIEQUIVALENT(S): at 16:18

## 2019-01-25 RX ADMIN — Medication 100 MILLIEQUIVALENT(S): at 17:25

## 2019-01-25 RX ADMIN — SODIUM CHLORIDE 1000 MILLILITER(S): 9 INJECTION, SOLUTION INTRAVENOUS at 18:28

## 2019-01-25 RX ADMIN — Medication 100 MILLIEQUIVALENT(S): at 18:08

## 2019-01-25 RX ADMIN — Medication 3.67 MICROGRAM(S)/KG/MIN: at 19:09

## 2019-01-25 RX ADMIN — Medication 125 MILLILITER(S): at 18:27

## 2019-01-25 NOTE — BRIEF OPERATIVE NOTE - PROCEDURE
<<-----Click on this checkbox to enter Procedure Atrial fenestration closure  01/25/2019  left atrial appendage ligation  Active  [facility]  ASD repair  01/25/2019    Active  [facility]  Tricuspid valve repair  01/25/2019  34mm ring  Active  [facility]  Mitral valve replacement  01/25/2019  31mm tissue valve  Active  [facility]

## 2019-01-25 NOTE — PROGRESS NOTE ADULT - SUBJECTIVE AND OBJECTIVE BOX
Chief Complaint:  Patient is a 53y old  Female who presents with a chief complaint of MVR / Tricuspid Repair / Cardiac Surgery (2019 14:43)    Interval Events:   Patient s/p tricuspid repair and mitral valve replacement. Transferred to CTICU    Allergies:  No Known Allergies    Hospital Medications:  cefuroxime  IVPB 1500 milliGRAM(s) IV Intermittent every 8 hours  chlorhexidine 0.12% Liquid 5 milliLiter(s) Oral Mucosa every 4 hours  dextrose 50% Injectable 50 milliLiter(s) IV Push every 15 minutes  dextrose 50% Injectable 25 milliLiter(s) IV Push every 15 minutes  DOBUTamine Infusion 1.5 MICROgram(s)/kG/Min IV Continuous <Continuous>  docusate sodium 100 milliGRAM(s) Oral three times a day  influenza   Vaccine 0.5 milliLiter(s) IntraMuscular once  insulin Infusion 3 Unit(s)/Hr IV Continuous <Continuous>  meperidine     Injectable 25 milliGRAM(s) IV Push once  metoclopramide Injectable 10 milliGRAM(s) IV Push every 8 hours  milrinone Infusion 0.25 MICROgram(s)/kG/Min IV Continuous <Continuous>  niCARdipine Infusion 5 mG/Hr IV Continuous <Continuous>  pantoprazole  Injectable 40 milliGRAM(s) IV Push daily  potassium chloride  10 mEq/50 mL IVPB 10 milliEquivalent(s) IV Intermittent every 1 hour  potassium chloride  10 mEq/50 mL IVPB 10 milliEquivalent(s) IV Intermittent every 1 hour  potassium chloride  10 mEq/50 mL IVPB 10 milliEquivalent(s) IV Intermittent every 1 hour  sodium chloride 0.9%. 1000 milliLiter(s) IV Continuous <Continuous>  warfarin 5 milliGRAM(s) Oral once    PMHX/PSHX:  HTN (hypertension)  Afib  No significant past surgical history    ROS:   Unable to Assess     PHYSICAL EXAM:   Vital Signs:  Vital Signs Last 24 Hrs  T(C): 36.3 (2019 15:30), Max: 36.9 (2019 20:02)  T(F): 97.3 (2019 15:30), Max: 98.5 (2019 20:02)  HR: 79 (2019 18:30) (70 - 84)  BP: 126/79 (2019 05:10) (126/79 - 173/104)  BP(mean): --  RR: 10 (2019 18:30) (10 - 25)  SpO2: 100% (2019 18:30) (95% - 100%)  Daily Height in cm: 157 (2019 14:10)    Daily     GENERAL:  NAD, Appears stated age  HEENT:  NC/AT,  conjunctivae clear and pink, sclera -anicteric  CHEST:  Normal Effort, Breath sounds clear, surgical sites dressed  HEART:  RRR, S1 + S2, no murmurs  ABDOMEN:  Soft, non-tender, non-distended, normoactive bowel sounds  EXTEREMITIES:  no cyanosis or edema  SKIN:  Warm & Dry.  NEURO:  Intubated    LABS:                        11.9   11.1  )-----------( CLUMPED    ( 2019 14:38 )             34.0     Mean Cell Volume: 89.3 fl (19 @ 14:38)        144  |  108  |  9   ----------------------------<  100<H>  3.8   |  22  |  0.65    Ca    8.5      2019 14:40  Phos  2.2       Mg     3.0         TPro  5.4<L>  /  Alb  3.3  /  TBili  1.4<H>  /  DBili  x   /  AST  33  /  ALT  17  /  AlkPhos  59  25    LIVER FUNCTIONS - ( 2019 14:40 )  Alb: 3.3 g/dL / Pro: 5.4 g/dL / ALK PHOS: 59 U/L / ALT: 17 U/L / AST: 33 U/L / GGT: x           PT/INR - ( 2019 14:38 )   PT: 19.5 sec;   INR: 1.68 ratio         PTT - ( 2019 14:38 )  PTT:34.8 sec  Urinalysis Basic - ( 2019 18:43 )    Color: Light Yellow / Appearance: Clear / S.014 / pH: x  Gluc: x / Ketone: Negative  / Bili: Negative / Urobili: 2 mg/dL   Blood: x / Protein: Negative / Nitrite: Negative   Leuk Esterase: Negative / RBC: x / WBC x   Sq Epi: x / Non Sq Epi: x / Bacteria: x    Imaging:

## 2019-01-25 NOTE — PROGRESS NOTE ADULT - ASSESSMENT
53F [place] hx afib not on AC, htn p/w chest pain. Patient reports midsternal burning chest pain started a few days ago, constant, gradually worsening worse with cough/deep breath and endorses slight shortness of breath. Radiates to R shoulder. Non exertional/non positional. Denies leg swelling, but notes that she returned on flight from [facility] yesterday (6hr flight). Also notes recent fevers/cough began approximately 1-2 weeks ago. No history of clots, no recent procedures/hospitalizations.    Sev MR  - s/p RHC  - for valve repair     liver cirrhosis on CT  - liver sono done  - hepatology follwing    Afib /  HTN  - cw coreg  - on a/c iv heparin    PULM nodule  - seen by pulm and CT surg  - PET scan as out pt

## 2019-01-25 NOTE — PROGRESS NOTE ADULT - SUBJECTIVE AND OBJECTIVE BOX
Patient is a 53y old  Female who presents with a chief complaint of chest pain (2019 15:57)      SUBJECTIVE / OVERNIGHT EVENTS:  pt seen and examined earlier this morning.  No chest pain. No shortness of breath. No complaints. No events overnight.     MEDICATIONS  (STANDING):  influenza   Vaccine 0.5 milliLiter(s) IntraMuscular once    MEDICATIONS  (PRN):      Vital Signs Last 24 Hrs  T(C): 36.8 (2019 05:10), Max: 36.9 (2019 20:02)  T(F): 98.2 (2019 05:10), Max: 98.5 (2019 20:02)  HR: 72 (2019 05:10) (72 - 84)  BP: 126/79 (2019 05:10) (126/79 - 173/104)  BP(mean): --  RR: 18 (2019 05:10) (16 - 20)  SpO2: 96% (2019 05:10) (94% - 96%)  CAPILLARY BLOOD GLUCOSE        I&O's Summary      PHYSICAL EXAM:  GENERAL: NAD, well-developed  HEAD:  Atraumatic, Normocephalic  EYES: EOMI, PERRLA, conjunctiva and sclera clear  NECK: Supple, No JVD  CHEST/LUNG: Clear to auscultation bilaterally; No wheeze  HEART: Regular rate and rhythm; No murmurs, rubs, or gallops  ABDOMEN: Soft, Nontender, Nondistended; Bowel sounds present  EXTREMITIES:  2+ Peripheral Pulses, No clubbing, cyanosis, or edema  PSYCH: AAOx3  NEUROLOGY: non-focal  SKIN: No rashes or lesions    LABS:                        13.3   6.36  )-----------( 229      ( 2019 07:39 )             40.2         144  |  104  |  12  ----------------------------<  100<H>  3.6   |  26  |  0.79    Ca    9.5      2019 05:56      PTT - ( 2019 07:37 )  PTT:59.4 sec      Urinalysis Basic - ( 2019 18:43 )    Color: Light Yellow / Appearance: Clear / S.014 / pH: x  Gluc: x / Ketone: Negative  / Bili: Negative / Urobili: 2 mg/dL   Blood: x / Protein: Negative / Nitrite: Negative   Leuk Esterase: Negative / RBC: x / WBC x   Sq Epi: x / Non Sq Epi: x / Bacteria: x        RADIOLOGY & ADDITIONAL TESTS:    Imaging Personally Reviewed:    Consultant(s) Notes Reviewed:      Care Discussed with Consultants/Other Providers:

## 2019-01-25 NOTE — BRIEF OPERATIVE NOTE - IV INFUSIONS - CRYSTALLOIDS
Dobutamine <<-----Click on this checkbox to enter Procedure Central line placement  09/01/2018    Active  [place]

## 2019-01-25 NOTE — PROGRESS NOTE ADULT - SUBJECTIVE AND OBJECTIVE BOX
Progress Note:   · Provider Specialty	Cardiology	    Reason for Admission:    Reason for Admission:  · Reason for Admission	chest pain	      · Subjective and Objective: 	  Referring: Nina Bellamy  Reason for Referral: SOB/Mitral Valve Disease    Patient seen and examined at bedside.  =====================  Interval Events:   - No complaints this morning. Denies CP, SOB, LE edema.   - Planning for CTS   =====================================    REVIEW OF SYSTEMS:  Constitutional:     No fevers, chills, weight loss, weight gain  HEENT:                  No dry eyes, nasal congestion, postnasal drip  CV:                         No chest pain, palpitations, orthopnea, PND  Resp:                     No cough, SOB, dyspnea, wheezing, sputum  GI:                          No nausea, vomiting, abdominal pain, diarrhea, constipation  :                        No dysuria, nocturia, hematuria, increased urinary frequency  Musculoskeletal: No back pain, myalgias, arthralgias   Skin:                       No rash, pruritus, ecchymoses  Neurological:        No headache, dizziness, syncope, weakness, numbness  Psychiatric:           No anxiety, depression   Endocrine:            No hot/cold intolerance, polydipsia  Heme/Lymph:      No bleeding, easy bruising  Allergic/Immune: No itchy eyes, rhinorrhea, hives angioedema      Current Meds:  atorvastatin 40 milliGRAM(s) Oral at bedtime  benzonatate 100 milliGRAM(s) Oral three times a day PRN  carvedilol 3.125 milliGRAM(s) Oral every 12 hours  cefuroxime  IVPB 1500 milliGRAM(s) IV Intermittent once  chlorhexidine 0.12% Liquid 30 milliLiter(s) Swish and Spit once  chlorhexidine 4% Liquid 1 Application(s) Topical once  heparin  Infusion. 900 Unit(s)/Hr IV Continuous <Continuous>  heparin  Injectable 6500 Unit(s) IV Push every 6 hours PRN  heparin  Injectable 3000 Unit(s) IV Push every 6 hours PRN  influenza   Vaccine 0.5 milliLiter(s) IntraMuscular once  isosorbide   mononitrate ER Tablet (IMDUR) 30 milliGRAM(s) Oral daily  LORazepam     Tablet 0.5 milliGRAM(s) Oral at bedtime PRN      PAST MEDICAL & SURGICAL HISTORY:  HTN (hypertension)  Afib  No significant past surgical history      Vitals:  T(F): 98.6 (01-24), Max: 98.6 (01-23)  HR: 74 (01-24) (71 - 84)  BP: 147/91 (01-24) (142/90 - 162/98)  RR: 18 (01-24)  SpO2: 97% (01-24)  I&O's Summary    23 Jan 2019 07:01  -  24 Jan 2019 07:00  --------------------------------------------------------  IN: 708 mL / OUT: 300 mL / NET: 408 mL        Physical Exam:  Appearance: No acute distress; well appearing, obese  Eyes: PERRL, EOMI, pink conjunctiva  HENT: Normal oral mucosa  Cardiovascular: irregularly irregular S1, S2, systolic and diastolic murmurs, +JVD  Respiratory: diminished in bases  Gastrointestinal: soft, non-tender, non-distended with normal bowel sounds  Musculoskeletal: No clubbing; no joint deformity   Neurologic: Non-focal  Lymphatic: No lymphadenopathy  Psychiatry: AAOx3, mood & affect appropriate  Skin: No rashes, ecchymoses, or cyanosis    Labs Personally Reviewed 1/ 25/2019                          13.8   7.6   )-----------( 246      ( 24 Jan 2019 06:18 )             40.7     01-23    142  |  105  |  12  ----------------------------<  102<H>  3.6   |  24  |  0.59    Ca    9.4      23 Jan 2019 05:35  Mg     1.9     01-23    TPro  7.5  /  Alb  4.0  /  TBili  1.0  /  DBili  x   /  AST  19  /  ALT  18  /  AlkPhos  93  01-23    PTT - ( 24 Jan 2019 06:18 )  PTT:75.2 sec      Serum Pro-Brain Natriuretic Peptide: 815 pg/mL (01-23 @ 18:43)      Hemoglobin A1C, Whole Blood: 5.8 % (01-23 @ 23:14)      New ECG(s): Personally reviewed    Echo:    Stress Testing:     Cath:    Imaging:    Interpretation of Telemetry: AFib, 2-4 sec pauses      Assessment and Plan:   · Assessment		  53 [place] female with Afib, HTN who presented with chest pain. LHC with clean coronaries. TTE with reduced EF, severe MR and mod MS.     #HFrEF  - LHC with clean cors  - cont Coreg, okay to hold ARB before surgery    #Severe MR  #Mod MS  - LA severely dilated  - CT surgery consulted, appreciate recs    AFib   - CHADS-VASc 2.  - cont coreg  - hep gtt

## 2019-01-25 NOTE — PROGRESS NOTE ADULT - SUBJECTIVE AND OBJECTIVE BOX
VENECIA FIGUEREDO   MRN#: [id number]     The patient is a 53y Female who was seen, evaluated, & examined with the CTICU staff post-operatively with a multidisciplinary care plan formulated & implemented.  All available clinical, laboratory, radiographic, pharmacologic, and electrocardiographic data were reviewed & analyzed.      The patient was in the CTICU in critical condition secondary to:     acute hypoxic respiratory failure-persistent cardiopulmonary dysfunction  cardiogenic shock-cardiovascular dysfunction    For support and evaluation & prevention of further decompensation secondary to persistent cardiopulmonary dysfunction and cardiogenic shock-cardiovascular dysfunction, respiratory status / failure required:     supplemental oxygen with full ventilatory support/mechanical ventilation  continuous pulse oximetry monitoring  following ABGs with A-line monitoring    Invasive hemodynamic monitoring with     a PA catheter was required to follow serial CIs/MVO2s   an A-line was required for continuous MAP/BP monitoring     to ensure adequate cardiovascular support and to evaluate for & help prevent decompensation while receiving     intermittent volume expansion  IV Dobutamine infusion  IV Primacor infusion  IV Cardene infusion    secondary to cardiogenic shock-cardiovascular dysfunction    In addition:   Weaning off of Dobutamine and Cardene and increasing Primacor in the setting of hypertension on Dobutamine and Cardene  Asystolic at start of the procedure; now v-paced at 80 bpm    The patient required critical care management and I provided 30 minutes of non-continuous care to the patient in addition to  discussing the patient and plan at length with the CTICU staff and helping coordinate care.

## 2019-01-25 NOTE — PROGRESS NOTE ADULT - ASSESSMENT
Impression:  # Cirrhotic Appearing Liver; Blood work not consistent with cirrhosis given normal albumin, bilirubin and platelets - however unable to rule out cirrhosis secondary to significant cardiac disease (ie passive congestion). If patient does have cirrhosis, would be a Child Class A (5 points).   # Mod AS/Severe MR: S/p replacement today  # Afib (not on AC)  # Pulmonary Nodule: Pulm recommending outpatient followup with PET and PFTs  # Hypertension    Recommendations:  - Care per CTICU team  - Supportive care per primary team    Lidia Villareal MD  Gastroenterology Fellow  [phone]  Please page on call fellow on weekends and after 5pm on weekdays Impression:  # Cirrhotic Appearing Liver; Blood work not consistent with cirrhosis given normal albumin, bilirubin and platelets - however unable to rule out cirrhosis secondary to significant cardiac disease (ie passive congestion). If patient does have cirrhosis, would be a Child Class A (5 points). Reviewed cath report in chart. HVPG of 11mmHg, consistent with portal Hypertension.  # Mod AS/Severe MR: S/p replacement today  # Afib (not on AC)  # Pulmonary Nodule: Pulm recommending outpatient followup with PET and PFTs  # Hypertension    Recommendations:  - Care per CTICU team  - Supportive care per primary team    Lidia Villareal MD  Gastroenterology Fellow  [phone]  Please page on call fellow on weekends and after 5pm on weekdays

## 2019-01-25 NOTE — BRIEF OPERATIVE NOTE - PRE-OP DX
ASD (atrial septal defect)  01/25/2019    Active  Race, Leilani  Mitral valve stenosis, unspecified etiology  01/25/2019    Active  Race, Leilani  Tricuspid valve insufficiency, unspecified etiology  01/25/2019    Active  Race, Leilani

## 2019-01-25 NOTE — AIRWAY REMOVAL NOTE  ADULT & PEDS - ARTIFICAL AIRWAY REMOVAL COMMENTS
written order for Written order for extubation verified. The patient was identified by full name and birth date compared to the identification band. Present during the procedure was Isa DE LA FUENTE.

## 2019-01-25 NOTE — BRIEF OPERATIVE NOTE - OPERATION/FINDINGS
normal EF, normal PA pressures, mixed rheumatic/myxomatous mitral valve, off bypass heart block to slow sinus rhythm prior to leaving room, V-paced

## 2019-01-26 LAB
ALBUMIN SERPL ELPH-MCNC: 3.8 G/DL — SIGNIFICANT CHANGE UP (ref 3.3–5)
ALP SERPL-CCNC: 55 U/L — SIGNIFICANT CHANGE UP (ref 40–120)
ALT FLD-CCNC: 19 U/L — SIGNIFICANT CHANGE UP (ref 10–45)
ANION GAP SERPL CALC-SCNC: 10 MMOL/L — SIGNIFICANT CHANGE UP (ref 5–17)
APTT BLD: 28.3 SEC — SIGNIFICANT CHANGE UP (ref 27.5–36.3)
APTT BLD: 38.2 SEC — HIGH (ref 27.5–36.3)
APTT BLD: 61.4 SEC — HIGH (ref 27.5–36.3)
AST SERPL-CCNC: 50 U/L — HIGH (ref 10–40)
BASE EXCESS BLDMV CALC-SCNC: -0.5 MMOL/L — SIGNIFICANT CHANGE UP (ref -3–3)
BASE EXCESS BLDMV CALC-SCNC: 0.1 MMOL/L — SIGNIFICANT CHANGE UP (ref -3–3)
BASE EXCESS BLDV CALC-SCNC: 1.3 MMOL/L — SIGNIFICANT CHANGE UP (ref -2–2)
BILIRUB SERPL-MCNC: 1.8 MG/DL — HIGH (ref 0.2–1.2)
BUN SERPL-MCNC: 9 MG/DL — SIGNIFICANT CHANGE UP (ref 7–23)
CALCIUM SERPL-MCNC: 8.6 MG/DL — SIGNIFICANT CHANGE UP (ref 8.4–10.5)
CHLORIDE SERPL-SCNC: 109 MMOL/L — HIGH (ref 96–108)
CO2 BLDMV-SCNC: 26 MMOL/L — SIGNIFICANT CHANGE UP (ref 21–29)
CO2 BLDMV-SCNC: 26 MMOL/L — SIGNIFICANT CHANGE UP (ref 21–29)
CO2 BLDV-SCNC: 28 MMOL/L — SIGNIFICANT CHANGE UP (ref 22–30)
CO2 SERPL-SCNC: 21 MMOL/L — LOW (ref 22–31)
CREAT SERPL-MCNC: 0.72 MG/DL — SIGNIFICANT CHANGE UP (ref 0.5–1.3)
GAS PNL BLDA: SIGNIFICANT CHANGE UP
GAS PNL BLDMV: SIGNIFICANT CHANGE UP
GAS PNL BLDMV: SIGNIFICANT CHANGE UP
GAS PNL BLDV: SIGNIFICANT CHANGE UP
GLUCOSE BLDC GLUCOMTR-MCNC: 110 MG/DL — HIGH (ref 70–99)
GLUCOSE BLDC GLUCOMTR-MCNC: 112 MG/DL — HIGH (ref 70–99)
GLUCOSE BLDC GLUCOMTR-MCNC: 112 MG/DL — HIGH (ref 70–99)
GLUCOSE BLDC GLUCOMTR-MCNC: 119 MG/DL — HIGH (ref 70–99)
GLUCOSE BLDC GLUCOMTR-MCNC: 122 MG/DL — HIGH (ref 70–99)
GLUCOSE BLDC GLUCOMTR-MCNC: 98 MG/DL — SIGNIFICANT CHANGE UP (ref 70–99)
GLUCOSE SERPL-MCNC: 110 MG/DL — HIGH (ref 70–99)
HCO3 BLDMV-SCNC: 25 MMOL/L — SIGNIFICANT CHANGE UP (ref 20–28)
HCO3 BLDMV-SCNC: 25 MMOL/L — SIGNIFICANT CHANGE UP (ref 20–28)
HCO3 BLDV-SCNC: 27 MMOL/L — SIGNIFICANT CHANGE UP (ref 21–29)
HCT VFR BLD CALC: 30.7 % — LOW (ref 34.5–45)
HGB BLD-MCNC: 10.8 G/DL — LOW (ref 11.5–15.5)
HOROWITZ INDEX BLDMV+IHG-RTO: 40 — SIGNIFICANT CHANGE UP
HOROWITZ INDEX BLDMV+IHG-RTO: 44 — SIGNIFICANT CHANGE UP
HOROWITZ INDEX BLDV+IHG-RTO: 32 — SIGNIFICANT CHANGE UP
INR BLD: 1.5 RATIO — HIGH (ref 0.88–1.16)
INR BLD: 1.76 RATIO — HIGH (ref 0.88–1.16)
MAGNESIUM SERPL-MCNC: 2.3 MG/DL — SIGNIFICANT CHANGE UP (ref 1.6–2.6)
MCHC RBC-ENTMCNC: 31.8 PG — SIGNIFICANT CHANGE UP (ref 27–34)
MCHC RBC-ENTMCNC: 35.2 GM/DL — SIGNIFICANT CHANGE UP (ref 32–36)
MCV RBC AUTO: 90.4 FL — SIGNIFICANT CHANGE UP (ref 80–100)
O2 CT VFR BLD CALC: 40 MMHG — SIGNIFICANT CHANGE UP (ref 30–65)
O2 CT VFR BLD CALC: 46 MMHG — SIGNIFICANT CHANGE UP (ref 30–65)
PCO2 BLDMV: 46 MMHG — SIGNIFICANT CHANGE UP (ref 30–65)
PCO2 BLDMV: 47 MMHG — SIGNIFICANT CHANGE UP (ref 30–65)
PCO2 BLDV: 49 MMHG — SIGNIFICANT CHANGE UP (ref 35–50)
PH BLDMV: 7.35 — SIGNIFICANT CHANGE UP (ref 7.32–7.45)
PH BLDMV: 7.36 — SIGNIFICANT CHANGE UP (ref 7.32–7.45)
PH BLDV: 7.36 — SIGNIFICANT CHANGE UP (ref 7.35–7.45)
PHOSPHATE SERPL-MCNC: 3.1 MG/DL — SIGNIFICANT CHANGE UP (ref 2.5–4.5)
PLATELET # BLD AUTO: 143 K/UL — LOW (ref 150–400)
PO2 BLDV: 40 MMHG — SIGNIFICANT CHANGE UP (ref 25–45)
POTASSIUM SERPL-MCNC: 4.9 MMOL/L — SIGNIFICANT CHANGE UP (ref 3.5–5.3)
POTASSIUM SERPL-SCNC: 4.9 MMOL/L — SIGNIFICANT CHANGE UP (ref 3.5–5.3)
PROT SERPL-MCNC: 6.1 G/DL — SIGNIFICANT CHANGE UP (ref 6–8.3)
PROTHROM AB SERPL-ACNC: 17.3 SEC — HIGH (ref 10–12.9)
PROTHROM AB SERPL-ACNC: 20.6 SEC — HIGH (ref 10–12.9)
RBC # BLD: 3.4 M/UL — LOW (ref 3.8–5.2)
RBC # FLD: 13.6 % — SIGNIFICANT CHANGE UP (ref 10.3–14.5)
SAO2 % BLDMV: 68 % — SIGNIFICANT CHANGE UP (ref 60–90)
SAO2 % BLDMV: 73 % — SIGNIFICANT CHANGE UP (ref 60–90)
SAO2 % BLDV: 69 % — SIGNIFICANT CHANGE UP (ref 67–88)
SODIUM SERPL-SCNC: 140 MMOL/L — SIGNIFICANT CHANGE UP (ref 135–145)
WBC # BLD: 11 K/UL — HIGH (ref 3.8–10.5)
WBC # FLD AUTO: 11 K/UL — HIGH (ref 3.8–10.5)

## 2019-01-26 PROCEDURE — 71045 X-RAY EXAM CHEST 1 VIEW: CPT | Mod: 26

## 2019-01-26 PROCEDURE — 99233 SBSQ HOSP IP/OBS HIGH 50: CPT

## 2019-01-26 PROCEDURE — 99291 CRITICAL CARE FIRST HOUR: CPT

## 2019-01-26 PROCEDURE — 93010 ELECTROCARDIOGRAM REPORT: CPT | Mod: 76

## 2019-01-26 PROCEDURE — 36620 INSERTION CATHETER ARTERY: CPT | Mod: 78

## 2019-01-26 RX ORDER — DEXTROSE 50 % IN WATER 50 %
25 SYRINGE (ML) INTRAVENOUS ONCE
Qty: 0 | Refills: 0 | Status: DISCONTINUED | OUTPATIENT
Start: 2019-01-26 | End: 2019-01-28

## 2019-01-26 RX ORDER — ALBUMIN HUMAN 25 %
250 VIAL (ML) INTRAVENOUS ONCE
Qty: 0 | Refills: 0 | Status: COMPLETED | OUTPATIENT
Start: 2019-01-26 | End: 2019-01-26

## 2019-01-26 RX ORDER — HEPARIN SODIUM 5000 [USP'U]/ML
600 INJECTION INTRAVENOUS; SUBCUTANEOUS
Qty: 25000 | Refills: 0 | Status: DISCONTINUED | OUTPATIENT
Start: 2019-01-26 | End: 2019-01-28

## 2019-01-26 RX ORDER — ASPIRIN/CALCIUM CARB/MAGNESIUM 324 MG
81 TABLET ORAL DAILY
Qty: 0 | Refills: 0 | Status: DISCONTINUED | OUTPATIENT
Start: 2019-01-26 | End: 2019-01-30

## 2019-01-26 RX ORDER — PANTOPRAZOLE SODIUM 20 MG/1
40 TABLET, DELAYED RELEASE ORAL
Qty: 0 | Refills: 0 | Status: DISCONTINUED | OUTPATIENT
Start: 2019-01-26 | End: 2019-01-30

## 2019-01-26 RX ORDER — GLUCAGON INJECTION, SOLUTION 0.5 MG/.1ML
1 INJECTION, SOLUTION SUBCUTANEOUS ONCE
Qty: 0 | Refills: 0 | Status: DISCONTINUED | OUTPATIENT
Start: 2019-01-26 | End: 2019-01-28

## 2019-01-26 RX ORDER — ACETAMINOPHEN 500 MG
1000 TABLET ORAL ONCE
Qty: 0 | Refills: 0 | Status: COMPLETED | OUTPATIENT
Start: 2019-01-26 | End: 2019-01-26

## 2019-01-26 RX ORDER — INSULIN LISPRO 100/ML
VIAL (ML) SUBCUTANEOUS
Qty: 0 | Refills: 0 | Status: DISCONTINUED | OUTPATIENT
Start: 2019-01-26 | End: 2019-01-30

## 2019-01-26 RX ORDER — DEXTROSE 50 % IN WATER 50 %
12.5 SYRINGE (ML) INTRAVENOUS ONCE
Qty: 0 | Refills: 0 | Status: DISCONTINUED | OUTPATIENT
Start: 2019-01-26 | End: 2019-01-28

## 2019-01-26 RX ORDER — HYDROMORPHONE HYDROCHLORIDE 2 MG/ML
0.5 INJECTION INTRAMUSCULAR; INTRAVENOUS; SUBCUTANEOUS ONCE
Qty: 0 | Refills: 0 | Status: DISCONTINUED | OUTPATIENT
Start: 2019-01-26 | End: 2019-01-26

## 2019-01-26 RX ORDER — ENOXAPARIN SODIUM 100 MG/ML
40 INJECTION SUBCUTANEOUS DAILY
Qty: 0 | Refills: 0 | Status: DISCONTINUED | OUTPATIENT
Start: 2019-01-26 | End: 2019-01-26

## 2019-01-26 RX ORDER — DEXTROSE 50 % IN WATER 50 %
25 SYRINGE (ML) INTRAVENOUS ONCE
Qty: 0 | Refills: 0 | Status: DISCONTINUED | OUTPATIENT
Start: 2019-01-26 | End: 2019-01-30

## 2019-01-26 RX ORDER — DEXTROSE 50 % IN WATER 50 %
15 SYRINGE (ML) INTRAVENOUS ONCE
Qty: 0 | Refills: 0 | Status: DISCONTINUED | OUTPATIENT
Start: 2019-01-26 | End: 2019-01-30

## 2019-01-26 RX ORDER — SODIUM CHLORIDE 9 MG/ML
1000 INJECTION, SOLUTION INTRAVENOUS
Qty: 0 | Refills: 0 | Status: DISCONTINUED | OUTPATIENT
Start: 2019-01-26 | End: 2019-01-30

## 2019-01-26 RX ORDER — WARFARIN SODIUM 2.5 MG/1
3 TABLET ORAL ONCE
Qty: 0 | Refills: 0 | Status: COMPLETED | OUTPATIENT
Start: 2019-01-26 | End: 2019-01-26

## 2019-01-26 RX ORDER — ONDANSETRON 8 MG/1
4 TABLET, FILM COATED ORAL ONCE
Qty: 0 | Refills: 0 | Status: COMPLETED | OUTPATIENT
Start: 2019-01-26 | End: 2019-01-26

## 2019-01-26 RX ORDER — ATORVASTATIN CALCIUM 80 MG/1
40 TABLET, FILM COATED ORAL AT BEDTIME
Qty: 0 | Refills: 0 | Status: DISCONTINUED | OUTPATIENT
Start: 2019-01-26 | End: 2019-01-30

## 2019-01-26 RX ORDER — FUROSEMIDE 40 MG
20 TABLET ORAL ONCE
Qty: 0 | Refills: 0 | Status: DISCONTINUED | OUTPATIENT
Start: 2019-01-26 | End: 2019-01-26

## 2019-01-26 RX ADMIN — Medication 2: at 21:24

## 2019-01-26 RX ADMIN — Medication 100 MILLIGRAM(S): at 01:45

## 2019-01-26 RX ADMIN — Medication 100 MILLIGRAM(S): at 07:48

## 2019-01-26 RX ADMIN — Medication 10 MILLIGRAM(S): at 13:27

## 2019-01-26 RX ADMIN — Medication 100 MILLIGRAM(S): at 17:17

## 2019-01-26 RX ADMIN — Medication 125 MILLILITER(S): at 11:22

## 2019-01-26 RX ADMIN — HYDROMORPHONE HYDROCHLORIDE 0.5 MILLIGRAM(S): 2 INJECTION INTRAMUSCULAR; INTRAVENOUS; SUBCUTANEOUS at 23:30

## 2019-01-26 RX ADMIN — HEPARIN SODIUM 8 UNIT(S)/HR: 5000 INJECTION INTRAVENOUS; SUBCUTANEOUS at 17:00

## 2019-01-26 RX ADMIN — HYDROMORPHONE HYDROCHLORIDE 0.5 MILLIGRAM(S): 2 INJECTION INTRAMUSCULAR; INTRAVENOUS; SUBCUTANEOUS at 23:15

## 2019-01-26 RX ADMIN — Medication 10 MILLIGRAM(S): at 06:30

## 2019-01-26 RX ADMIN — Medication 125 MILLILITER(S): at 16:28

## 2019-01-26 RX ADMIN — Medication 125 MILLILITER(S): at 09:17

## 2019-01-26 RX ADMIN — WARFARIN SODIUM 3 MILLIGRAM(S): 2.5 TABLET ORAL at 21:24

## 2019-01-26 RX ADMIN — HEPARIN SODIUM 6 UNIT(S)/HR: 5000 INJECTION INTRAVENOUS; SUBCUTANEOUS at 08:35

## 2019-01-26 RX ADMIN — Medication 81 MILLIGRAM(S): at 13:27

## 2019-01-26 RX ADMIN — HYDROMORPHONE HYDROCHLORIDE 0.5 MILLIGRAM(S): 2 INJECTION INTRAMUSCULAR; INTRAVENOUS; SUBCUTANEOUS at 08:15

## 2019-01-26 RX ADMIN — Medication 10 MILLIGRAM(S): at 21:24

## 2019-01-26 RX ADMIN — HYDROMORPHONE HYDROCHLORIDE 0.5 MILLIGRAM(S): 2 INJECTION INTRAMUSCULAR; INTRAVENOUS; SUBCUTANEOUS at 07:45

## 2019-01-26 RX ADMIN — ONDANSETRON 4 MILLIGRAM(S): 8 TABLET, FILM COATED ORAL at 00:00

## 2019-01-26 RX ADMIN — Medication 100 MILLIGRAM(S): at 13:27

## 2019-01-26 RX ADMIN — Medication 100 MILLIEQUIVALENT(S): at 01:00

## 2019-01-26 RX ADMIN — Medication 100 MILLIGRAM(S): at 21:24

## 2019-01-26 RX ADMIN — Medication 400 MILLIGRAM(S): at 13:43

## 2019-01-26 RX ADMIN — ATORVASTATIN CALCIUM 40 MILLIGRAM(S): 80 TABLET, FILM COATED ORAL at 21:24

## 2019-01-26 RX ADMIN — Medication 1000 MILLIGRAM(S): at 14:30

## 2019-01-26 RX ADMIN — Medication 125 MILLILITER(S): at 08:32

## 2019-01-26 NOTE — PROGRESS NOTE ADULT - SUBJECTIVE AND OBJECTIVE BOX
Progress Note:   · Provider Specialty	Cardiology	    Reason for Admission:    Reason for Admission:  · Reason for Admission	chest pain	      · Subjective and Objective: 	  Referring: Nina Bellamy  Reason for Referral: SOB/Mitral Valve Disease    Patient seen and examined at bedside.  =====================  Interval Events:   - No complaints this morning. Denies CP, SOB, LE edema.   - Doing well post op  =====================================    REVIEW OF SYSTEMS:  Constitutional:     No fevers, chills, weight loss, weight gain  HEENT:                  No dry eyes, nasal congestion, postnasal drip  CV:                         No chest pain, palpitations, orthopnea, PND  Resp:                     No cough, SOB, dyspnea, wheezing, sputum  GI:                          No nausea, vomiting, abdominal pain, diarrhea, constipation  :                        No dysuria, nocturia, hematuria, increased urinary frequency  Musculoskeletal: No back pain, myalgias, arthralgias   Skin:                       No rash, pruritus, ecchymoses  Neurological:        No headache, dizziness, syncope, weakness, numbness  Psychiatric:           No anxiety, depression   Endocrine:            No hot/cold intolerance, polydipsia  Heme/Lymph:      No bleeding, easy bruising  Allergic/Immune: No itchy eyes, rhinorrhea, hives angioedema      Current Meds:  atorvastatin 40 milliGRAM(s) Oral at bedtime  benzonatate 100 milliGRAM(s) Oral three times a day PRN  carvedilol 3.125 milliGRAM(s) Oral every 12 hours  cefuroxime  IVPB 1500 milliGRAM(s) IV Intermittent once  chlorhexidine 0.12% Liquid 30 milliLiter(s) Swish and Spit once  chlorhexidine 4% Liquid 1 Application(s) Topical once  heparin  Infusion. 900 Unit(s)/Hr IV Continuous <Continuous>  heparin  Injectable 6500 Unit(s) IV Push every 6 hours PRN  heparin  Injectable 3000 Unit(s) IV Push every 6 hours PRN  influenza   Vaccine 0.5 milliLiter(s) IntraMuscular once  isosorbide   mononitrate ER Tablet (IMDUR) 30 milliGRAM(s) Oral daily  LORazepam     Tablet 0.5 milliGRAM(s) Oral at bedtime PRN      PAST MEDICAL & SURGICAL HISTORY:  HTN (hypertension)  Afib  No significant past surgical history      Vitals:  T(F): 98.6 (01-24), Max: 98.6 (01-23)  HR: 74 (01-24) (71 - 84)  BP: 147/91 (01-24) (142/90 - 162/98)  RR: 18 (01-24)  SpO2: 97% (01-24)  I&O's Summary    23 Jan 2019 07:01  -  24 Jan 2019 07:00  --------------------------------------------------------  IN: 708 mL / OUT: 300 mL / NET: 408 mL        Physical Exam:  Appearance: No acute distress; well appearing, obese  Eyes: PERRL, EOMI, pink conjunctiva  HENT: Normal oral mucosa  Cardiovascular: irregularly irregular S1, S2, +Sternotomy  Respiratory: diminished in bases  Gastrointestinal: soft, non-tender, non-distended with normal bowel sounds  Musculoskeletal: No clubbing; no joint deformity   Neurologic: Non-focal  Lymphatic: No lymphadenopathy  Psychiatry: AAOx3, mood & affect appropriate  Skin: No rashes, ecchymoses, or cyanosis    Labs Personally Reviewed 1/ 26/2019                          13.8   7.6   )-----------( 246      ( 24 Jan 2019 06:18 )             40.7     01-23    142  |  105  |  12  ----------------------------<  102<H>  3.6   |  24  |  0.59    Ca    9.4      23 Jan 2019 05:35  Mg     1.9     01-23    TPro  7.5  /  Alb  4.0  /  TBili  1.0  /  DBili  x   /  AST  19  /  ALT  18  /  AlkPhos  93  01-23    PTT - ( 24 Jan 2019 06:18 )  PTT:75.2 sec      Serum Pro-Brain Natriuretic Peptide: 815 pg/mL (01-23 @ 18:43)      Hemoglobin A1C, Whole Blood: 5.8 % (01-23 @ 23:14)      New ECG(s): Personally reviewed    Echo:    Stress Testing:     Cath:    Imaging:    Interpretation of Telemetry: AFib, 2-4 sec pauses      Assessment and Plan:   · Assessment		  53 Armenian female with Afib, HTN who presented with chest pain. LHC with clean coronaries and severe MS s/p MVR  - continue current management per CTU.  - consider coreg  - hep gtt  - cont asa

## 2019-01-26 NOTE — CONSULT NOTE ADULT - SUBJECTIVE AND OBJECTIVE BOX
Patient seen and evaluated at bedside    HPI:  53 Bahraini female with Afib, HTN presents with chest pain. Patient reports midsternal burning chest pain started a few days ago. She says it is worse with coughing and exertion. It lasts for approx 30 mins and then goes away. She says that she has had CP for the last year but worsened a few weeks ago after a URI. She sees a cardiologist in [facility] and was going to undergo further testing but then she had this travel scheduled so decided to go through with it. No on AC for Afib because she was having hemoptysis when she was discovered to have AF, which was a few weeks ago.    Hospital Course:   She underwent :   ASD (atrial septal defect)  01/25/2019   Mitral valve stenosis, unspecified etiology  01/25/2019      Tricuspid valve insufficiency, unspecified etiology  01/25/2019      During OR time, pt reportedly with pauses, unclear duration.   EP consulted for possible evaluation of PPM  Patient is currently in Afib with rates from 60-70s.   She is without complaints at present  She is not pace maker dependent. This is post op day 1 for the patient currently.         PMH:   HTN (hypertension)  Afib      PSH:   No significant past surgical history      Medications:   aspirin enteric coated 81 milliGRAM(s) Oral daily  atorvastatin 40 milliGRAM(s) Oral at bedtime  cefuroxime  IVPB 1500 milliGRAM(s) IV Intermittent every 8 hours  dextrose 50% Injectable 50 milliLiter(s) IV Push every 15 minutes  dextrose 50% Injectable 25 milliLiter(s) IV Push every 15 minutes  docusate sodium 100 milliGRAM(s) Oral three times a day  furosemide   Injectable 20 milliGRAM(s) IV Push once  heparin  Infusion 600 Unit(s)/Hr IV Continuous <Continuous>  meperidine     Injectable 25 milliGRAM(s) IV Push once  metoclopramide Injectable 10 milliGRAM(s) IV Push every 8 hours  milrinone Infusion 0.25 MICROgram(s)/kG/Min IV Continuous <Continuous>  niCARdipine Infusion 5 mG/Hr IV Continuous <Continuous>  potassium chloride  10 mEq/50 mL IVPB 10 milliEquivalent(s) IV Intermittent every 1 hour  potassium chloride  10 mEq/50 mL IVPB 10 milliEquivalent(s) IV Intermittent every 1 hour  potassium chloride  10 mEq/50 mL IVPB 10 milliEquivalent(s) IV Intermittent every 1 hour  sodium chloride 0.9%. 1000 milliLiter(s) IV Continuous <Continuous>      Allergies:  No Known Allergies      FAMILY HISTORY:  No pertinent family history in first degree relatives    Review of Systems:  REVIEW OF SYSTEMS:  CONSTITUTIONAL: No weakness, fevers or chills  EYES/ENT: No visual changes;  No dysphagia  NECK: No pain or stiffness  RESPIRATORY: No cough, wheezing, hemoptysis; No shortness of breath  CARDIOVASCULAR: No chest pain or palpitations; No lower extremity edema  GASTROINTESTINAL: No abdominal or epigastric pain. No nausea, vomiting, or hematemesis; No diarrhea or constipation. No melena or hematochezia.  BACK: No back pain  GENITOURINARY: No dysuria, frequency or hematuria  NEUROLOGICAL: No numbness or weakness  SKIN: No itching, burning, rashes, or lesions   All other review of systems is negative unless indicated above.    Physical Exam:  T(F): 99.9 (01-26), Max: 100.8 (01-26)  HR: 86 (01-26) (63 - 99)  BP: 126/58 (01-26) (92/50 - 158/69)  RR: 24 (01-26)  SpO2: 98% (01-26)  GENERAL: No acute distress,  HEAD:  Atraumatic, Normocephalic  ENT: EOMI, PERRLA, conjunctiva and sclera clear, Neck supple, No JVD, moist mucosa  CHEST/LUNG: Clear to auscultation bilaterally; central sternotomy  site   HEART: irregular rhythm.   ABDOMEN: Soft, Nontender, Nondistended; Bowel sounds present  EXTREMITIES:  No clubbing, cyanosis, or edema  PSYCH: Nl behavior, nl affect  NEUROLOGY: AAOx3, non-focal, cranial nerves intact  SKIN: Normal color, No rashes or lesions  LINES:    Cardiovascular Diagnostic Testing:    ECG: Personally reviewed  paced rhythm       Echo:  < from: Transthoracic Echocardiogram (01.22.19 @ 07:32) >  Conclusions:  1. Doming and thickening of the tips of the mitral valve  consistent with rheumatic mitral valve disease. Severe  mitral regurgitation. Mean transmitral valve gradient  equals 8 mm Hg, consistent with moderate mitral stenosis.  2. Severely dilated left atrium.  LA volume index = 142  cc/m2.  3. Moderate concentric left ventricular hypertrophy.  4. Normal left ventricular systolic function , with  paradoxical septal motion consistent with right heart  overload. No segmental wall motion abnormalities.  5. Normal right ventricular size with decreased right  ventricular systolic function.  6. Estimated pulmonary artery systolic pressure equals 55  mm Hg, assuming right atrial pressure equals 8 mm Hg,  consistent with moderate to severe pulmonary pressures.    < end of copied text >        Interpretation of Telemetry:  Afib rate controlled     Imaging:    Labs: Personally reviewed                        10.8   11.0  )-----------( 143      ( 26 Jan 2019 00:59 )             30.7     01-26    140  |  109<H>  |  9   ----------------------------<  110<H>  4.9   |  21<L>  |  0.72    Ca    8.6      26 Jan 2019 00:59  Phos  3.1     01-26  Mg     2.3     01-26    TPro  6.1  /  Alb  3.8  /  TBili  1.8<H>  /  DBili  x   /  AST  50<H>  /  ALT  19  /  AlkPhos  55  01-26    PT/INR - ( 26 Jan 2019 00:59 )   PT: 17.3 sec;   INR: 1.50 ratio         PTT - ( 26 Jan 2019 00:59 )  PTT:28.3 sec  CARDIAC MARKERS ( 25 Jan 2019 14:40 )  342 ng/L / x     / x     / 253 U/L / x     / 20.2 ng/mL  CARDIAC MARKERS ( 20 Jan 2019 23:36 )  8 ng/L / x     / x     / x     / x     / x      CARDIAC MARKERS ( 20 Jan 2019 11:12 )  9 ng/L / x     / x     / x     / x     / x          Serum Pro-Brain Natriuretic Peptide: 815 pg/mL (01-23 @ 18:43)      Hemoglobin A1C, Whole Blood: 5.8 % (01-23 @ 23:14)    Thyroid Stimulating Hormone, Serum: 2.85 uIU/mL (01-21 @ 08:39)

## 2019-01-26 NOTE — PROCEDURE NOTE - PROCEDURE
<<-----Click on this checkbox to enter Procedure Arterial line placement  01/26/2019    Active  [id number]

## 2019-01-26 NOTE — PROCEDURE NOTE - NSPROCDETAILS_GEN_ALL_CORE
connected to a pressurized flush line/positive blood return obtained via catheter/Seldinger technique/sutured in place/all materials/supplies accounted for at end of procedure/location identified, draped/prepped, sterile technique used, needle inserted/introduced/hemostasis with direct pressure, dressing applied

## 2019-01-26 NOTE — CONSULT NOTE ADULT - ASSESSMENT
A/P:  53 Lao female with Afib, HTN presents with chest pain, found to have moderate MS, TR, now s/p MVR and TV repair and ASD closure, with noted pauses during OR time. EP c/s for possible need of PPM. Patient back to inherent Afib rhythm and is not pacemaker dependent. she has a reported hx of cirrhosis per primary team , and is on coreg at baseline.   Plan:  - continue to monitor on tele  - EP to continue to follow to assess daily if need for PPM arises in this immediate post op course.   - case discussed with Dr. Ruffin.     Ashish Singleton MD  Cardiology fellow  x [phone]

## 2019-01-26 NOTE — PHYSICAL THERAPY INITIAL EVALUATION ADULT - ADDITIONAL COMMENTS
PTA pt lived in pvt home with daughter, flight to enter +HR, no steps inside, independent without AD.

## 2019-01-26 NOTE — PHYSICAL THERAPY INITIAL EVALUATION ADULT - PERTINENT HX OF CURRENT PROBLEM, REHAB EVAL
Pt is a 53 [place] female with Afib, HTN who presented with chest pain. LHC with clean coronaries. TTE with reduced EF, severe MR and mod MS.

## 2019-01-26 NOTE — PROGRESS NOTE ADULT - SUBJECTIVE AND OBJECTIVE BOX
9pm-9:30pm  Remained critically ill on continuos ICU monitoring.        OBJECTIVE:    T(C): 37.9 (01-26-19 @ 20:00), Max: 38.2 (01-26-19 @ 08:00)  T(F): 100.2 (01-26-19 @ 20:00), Max: 100.8 (01-26-19 @ 08:00)  HR: 59 (01-26-19 @ 21:00) (59 - 99)  BP: 154/69 (01-26-19 @ 20:00) (92/50 - 158/69)  ABP: 156/67 (01-26-19 @ 21:00) (0/0 - 171/69)  ABP(mean): 92 (01-26-19 @ 21:00) (0 - 105)  RR: 25 (01-26-19 @ 21:00) (6 - 39)  SpO2: 100% (01-26-19 @ 21:00) (95% - 100%)                PHYSICAL EXAM:    Daily Weight in kG:   General: Extubated   Neurology: AAO X3  nonfocal, BASURTO x 4  Eyes: PERRLA/ EOMI, Gross vision intact  ENT/Neck: Neck supple trachea midline, No JVD, Gross hearing intact  Respiratory:  B/L fine  rales + sternal dressing M  pleural chest tubes  CV: 	IRIR A fib no murmurs, rubs or gallops  Abdominal: Soft, NT, ND +BS,   Extremities: No edema, + peripheral pulses  Skin: No Rashes, Hematoma, Ecchymosis          HOSPITAL MEDICATIONS:  MEDICATIONS  (STANDING):  MEDICATIONS  (STANDING):  aspirin enteric coated 81 milliGRAM(s) Oral daily  atorvastatin 40 milliGRAM(s) Oral at bedtime  cefuroxime  IVPB 1500 milliGRAM(s) IV Intermittent every 8 hours  dextrose 5%. 1000 milliLiter(s) (50 mL/Hr) IV Continuous <Continuous>  dextrose 50% Injectable 12.5 Gram(s) IV Push once  dextrose 50% Injectable 25 Gram(s) IV Push once  dextrose 50% Injectable 25 Gram(s) IV Push once  dextrose 50% Injectable 50 milliLiter(s) IV Push every 15 minutes  dextrose 50% Injectable 25 milliLiter(s) IV Push every 15 minutes  docusate sodium 100 milliGRAM(s) Oral three times a day  heparin  Infusion 600 Unit(s)/Hr (8 mL/Hr) IV Continuous <Continuous>  insulin lispro (HumaLOG) corrective regimen sliding scale   SubCutaneous Before meals and at bedtime  metoclopramide Injectable 10 milliGRAM(s) IV Push every 8 hours  pantoprazole    Tablet 40 milliGRAM(s) Oral before breakfast  potassium chloride  10 mEq/50 mL IVPB 10 milliEquivalent(s) IV Intermittent every 1 hour  potassium chloride  10 mEq/50 mL IVPB 10 milliEquivalent(s) IV Intermittent every 1 hour  potassium chloride  10 mEq/50 mL IVPB 10 milliEquivalent(s) IV Intermittent every 1 hour  sodium chloride 0.9%. 1000 milliLiter(s) (10 mL/Hr) IV Continuous <Continuous>    MEDICATIONS  (PRN):  dextrose 40% Gel 15 Gram(s) Oral once PRN Blood Glucose LESS THAN 70 milliGRAM(s)/deciliter  glucagon  Injectable 1 milliGRAM(s) IntraMuscular once PRN Glucose LESS THAN 70 milligrams/deciliter        MEDICATIONS  (PRN):      LABS:              10.8                 140  | 21   | 9            11.0  >-----------< 143     ------------------------< 110                   30.7                 4.9  | 109  | 0.72                                         Ca 8.6   Mg 2.3   Ph 3.1                       RADIOLOGY:  X Reviewed and interpreted by me            Assessment and Plan:   HPI:  53F Latvian hx afib not on AC, HTN, Severe MS, TR, ASD, Cardiac liver cirrhosis,   S/P MVR(t), TV repair, ASD closure, NASIR ligation, POD #1  intra op course complicated with episodes of asystole, post op V pacing, Labile BP requiring Inotropic support, Hypovoumia requiring volume resuscitation    Plan f/u ABGs, Spo2, CXR, extubated maintain all chest tubes, hemodynamically stable.  A-fib back up pacing in place, monitor for post op bleeding, ++ chest tube outputs.   Dobtumine & primacor dced, maintain invasive hemodynamic monitoring    Heparin gtt Warfarin   EP consultation  Asa & statins    Pressors to maintain MAP > 70 f/u perfusion indices, volume resuscitation  Glycemic control,   Tanya op antibiotics.  GIon the case for cardiac cirrhosis      I have spent 30 minutes providing critical care management to this patient.

## 2019-01-26 NOTE — PHYSICAL THERAPY INITIAL EVALUATION ADULT - PLANNED THERAPY INTERVENTIONS, PT EVAL
Stair Training: Goal: Improve to 10 steps I with single rail, step to pattern by 4 weeks./bed mobility training/transfer training/gait training

## 2019-01-27 DIAGNOSIS — I45.5 OTHER SPECIFIED HEART BLOCK: ICD-10-CM

## 2019-01-27 DIAGNOSIS — Z95.2 PRESENCE OF PROSTHETIC HEART VALVE: ICD-10-CM

## 2019-01-27 DIAGNOSIS — Z96.89 PRESENCE OF OTHER SPECIFIED FUNCTIONAL IMPLANTS: ICD-10-CM

## 2019-01-27 DIAGNOSIS — I48.2 CHRONIC ATRIAL FIBRILLATION: ICD-10-CM

## 2019-01-27 DIAGNOSIS — E87.70 FLUID OVERLOAD, UNSPECIFIED: ICD-10-CM

## 2019-01-27 DIAGNOSIS — Z29.9 ENCOUNTER FOR PROPHYLACTIC MEASURES, UNSPECIFIED: ICD-10-CM

## 2019-01-27 DIAGNOSIS — I10 ESSENTIAL (PRIMARY) HYPERTENSION: ICD-10-CM

## 2019-01-27 LAB
ALBUMIN SERPL ELPH-MCNC: 4.2 G/DL — SIGNIFICANT CHANGE UP (ref 3.3–5)
ALP SERPL-CCNC: 57 U/L — SIGNIFICANT CHANGE UP (ref 40–120)
ALT FLD-CCNC: 21 U/L — SIGNIFICANT CHANGE UP (ref 10–45)
APTT BLD: 54 SEC — HIGH (ref 27.5–36.3)
AST SERPL-CCNC: 44 U/L — HIGH (ref 10–40)
BASOPHILS # BLD AUTO: 0 K/UL — SIGNIFICANT CHANGE UP (ref 0–0.2)
BASOPHILS NFR BLD AUTO: 0.1 % — SIGNIFICANT CHANGE UP (ref 0–2)
BILIRUB SERPL-MCNC: 1.4 MG/DL — HIGH (ref 0.2–1.2)
BUN SERPL-MCNC: 12 MG/DL — SIGNIFICANT CHANGE UP (ref 7–23)
CALCIUM SERPL-MCNC: 8.9 MG/DL — SIGNIFICANT CHANGE UP (ref 8.4–10.5)
CHLORIDE SERPL-SCNC: 107 MMOL/L — SIGNIFICANT CHANGE UP (ref 96–108)
CO2 SERPL-SCNC: 23 MMOL/L — SIGNIFICANT CHANGE UP (ref 22–31)
CREAT SERPL-MCNC: 0.81 MG/DL — SIGNIFICANT CHANGE UP (ref 0.5–1.3)
EOSINOPHIL # BLD AUTO: 0.1 K/UL — SIGNIFICANT CHANGE UP (ref 0–0.5)
EOSINOPHIL NFR BLD AUTO: 1.4 % — SIGNIFICANT CHANGE UP (ref 0–6)
GAS PNL BLDA: SIGNIFICANT CHANGE UP
GLUCOSE BLDC GLUCOMTR-MCNC: 111 MG/DL — HIGH (ref 70–99)
GLUCOSE BLDC GLUCOMTR-MCNC: 126 MG/DL — HIGH (ref 70–99)
GLUCOSE BLDC GLUCOMTR-MCNC: 150 MG/DL — HIGH (ref 70–99)
GLUCOSE SERPL-MCNC: 129 MG/DL — HIGH (ref 70–99)
HCT VFR BLD CALC: 26.8 % — LOW (ref 34.5–45)
HGB BLD-MCNC: 9.2 G/DL — LOW (ref 11.5–15.5)
INR BLD: 1.67 RATIO — HIGH (ref 0.88–1.16)
LYMPHOCYTES # BLD AUTO: 1.5 K/UL — SIGNIFICANT CHANGE UP (ref 1–3.3)
LYMPHOCYTES # BLD AUTO: 13.7 % — SIGNIFICANT CHANGE UP (ref 13–44)
MAGNESIUM SERPL-MCNC: 2.1 MG/DL — SIGNIFICANT CHANGE UP (ref 1.6–2.6)
MCHC RBC-ENTMCNC: 31.2 PG — SIGNIFICANT CHANGE UP (ref 27–34)
MCHC RBC-ENTMCNC: 34.3 GM/DL — SIGNIFICANT CHANGE UP (ref 32–36)
MCV RBC AUTO: 90.9 FL — SIGNIFICANT CHANGE UP (ref 80–100)
MONOCYTES # BLD AUTO: 0.9 K/UL — SIGNIFICANT CHANGE UP (ref 0–0.9)
MONOCYTES NFR BLD AUTO: 8.5 % — SIGNIFICANT CHANGE UP (ref 2–14)
NEUTROPHILS # BLD AUTO: 8.2 K/UL — HIGH (ref 1.8–7.4)
NEUTROPHILS NFR BLD AUTO: 76.3 % — SIGNIFICANT CHANGE UP (ref 43–77)
PHOSPHATE SERPL-MCNC: 2.4 MG/DL — LOW (ref 2.5–4.5)
PLATELET # BLD AUTO: 100 K/UL — LOW (ref 150–400)
POTASSIUM SERPL-MCNC: 4.1 MMOL/L — SIGNIFICANT CHANGE UP (ref 3.5–5.3)
POTASSIUM SERPL-SCNC: 4.1 MMOL/L — SIGNIFICANT CHANGE UP (ref 3.5–5.3)
PROT SERPL-MCNC: 6.5 G/DL — SIGNIFICANT CHANGE UP (ref 6–8.3)
PROTHROM AB SERPL-ACNC: 19.3 SEC — HIGH (ref 10–12.9)
RBC # BLD: 2.94 M/UL — LOW (ref 3.8–5.2)
RBC # FLD: 13.6 % — SIGNIFICANT CHANGE UP (ref 10.3–14.5)
SODIUM SERPL-SCNC: 140 MMOL/L — SIGNIFICANT CHANGE UP (ref 135–145)
WBC # BLD: 10.8 K/UL — HIGH (ref 3.8–10.5)
WBC # FLD AUTO: 10.8 K/UL — HIGH (ref 3.8–10.5)

## 2019-01-27 PROCEDURE — 71045 X-RAY EXAM CHEST 1 VIEW: CPT | Mod: 26

## 2019-01-27 PROCEDURE — 93010 ELECTROCARDIOGRAM REPORT: CPT

## 2019-01-27 PROCEDURE — 99233 SBSQ HOSP IP/OBS HIGH 50: CPT

## 2019-01-27 RX ORDER — POTASSIUM CHLORIDE 20 MEQ
20 PACKET (EA) ORAL DAILY
Qty: 0 | Refills: 0 | Status: DISCONTINUED | OUTPATIENT
Start: 2019-01-27 | End: 2019-01-30

## 2019-01-27 RX ORDER — FUROSEMIDE 40 MG
40 TABLET ORAL DAILY
Qty: 0 | Refills: 0 | Status: DISCONTINUED | OUTPATIENT
Start: 2019-01-27 | End: 2019-01-30

## 2019-01-27 RX ORDER — POTASSIUM CHLORIDE 20 MEQ
20 PACKET (EA) ORAL ONCE
Qty: 0 | Refills: 0 | Status: COMPLETED | OUTPATIENT
Start: 2019-01-27 | End: 2019-01-27

## 2019-01-27 RX ORDER — LOSARTAN POTASSIUM 100 MG/1
25 TABLET, FILM COATED ORAL EVERY 12 HOURS
Qty: 0 | Refills: 0 | Status: DISCONTINUED | OUTPATIENT
Start: 2019-01-27 | End: 2019-01-30

## 2019-01-27 RX ORDER — HYDRALAZINE HCL 50 MG
5 TABLET ORAL ONCE
Qty: 0 | Refills: 0 | Status: COMPLETED | OUTPATIENT
Start: 2019-01-27 | End: 2019-01-27

## 2019-01-27 RX ORDER — SENNA PLUS 8.6 MG/1
2 TABLET ORAL AT BEDTIME
Qty: 0 | Refills: 0 | Status: DISCONTINUED | OUTPATIENT
Start: 2019-01-27 | End: 2019-01-30

## 2019-01-27 RX ORDER — POLYETHYLENE GLYCOL 3350 17 G/17G
17 POWDER, FOR SOLUTION ORAL DAILY
Qty: 0 | Refills: 0 | Status: DISCONTINUED | OUTPATIENT
Start: 2019-01-27 | End: 2019-01-30

## 2019-01-27 RX ORDER — METOPROLOL TARTRATE 50 MG
12.5 TABLET ORAL DAILY
Qty: 0 | Refills: 0 | Status: DISCONTINUED | OUTPATIENT
Start: 2019-01-27 | End: 2019-01-28

## 2019-01-27 RX ORDER — FENTANYL CITRATE 50 UG/ML
25 INJECTION INTRAVENOUS ONCE
Qty: 0 | Refills: 0 | Status: DISCONTINUED | OUTPATIENT
Start: 2019-01-27 | End: 2019-01-27

## 2019-01-27 RX ORDER — OXYCODONE AND ACETAMINOPHEN 5; 325 MG/1; MG/1
2 TABLET ORAL EVERY 4 HOURS
Qty: 0 | Refills: 0 | Status: DISCONTINUED | OUTPATIENT
Start: 2019-01-27 | End: 2019-01-30

## 2019-01-27 RX ORDER — WARFARIN SODIUM 2.5 MG/1
5 TABLET ORAL ONCE
Qty: 0 | Refills: 0 | Status: COMPLETED | OUTPATIENT
Start: 2019-01-27 | End: 2019-01-27

## 2019-01-27 RX ORDER — LOSARTAN POTASSIUM 100 MG/1
25 TABLET, FILM COATED ORAL ONCE
Qty: 0 | Refills: 0 | Status: COMPLETED | OUTPATIENT
Start: 2019-01-27 | End: 2019-01-27

## 2019-01-27 RX ORDER — FUROSEMIDE 40 MG
20 TABLET ORAL ONCE
Qty: 0 | Refills: 0 | Status: COMPLETED | OUTPATIENT
Start: 2019-01-27 | End: 2019-01-27

## 2019-01-27 RX ADMIN — PANTOPRAZOLE SODIUM 40 MILLIGRAM(S): 20 TABLET, DELAYED RELEASE ORAL at 05:23

## 2019-01-27 RX ADMIN — Medication 10 MILLIGRAM(S): at 13:23

## 2019-01-27 RX ADMIN — LOSARTAN POTASSIUM 25 MILLIGRAM(S): 100 TABLET, FILM COATED ORAL at 17:06

## 2019-01-27 RX ADMIN — Medication 81 MILLIGRAM(S): at 12:30

## 2019-01-27 RX ADMIN — Medication 2: at 17:06

## 2019-01-27 RX ADMIN — FENTANYL CITRATE 25 MICROGRAM(S): 50 INJECTION INTRAVENOUS at 10:20

## 2019-01-27 RX ADMIN — Medication 5 MILLIGRAM(S): at 03:42

## 2019-01-27 RX ADMIN — OXYCODONE AND ACETAMINOPHEN 1 TABLET(S): 5; 325 TABLET ORAL at 21:31

## 2019-01-27 RX ADMIN — Medication 10 MILLIGRAM(S): at 05:23

## 2019-01-27 RX ADMIN — Medication 20 MILLIGRAM(S): at 06:48

## 2019-01-27 RX ADMIN — Medication 100 MILLIGRAM(S): at 05:23

## 2019-01-27 RX ADMIN — LOSARTAN POTASSIUM 25 MILLIGRAM(S): 100 TABLET, FILM COATED ORAL at 07:50

## 2019-01-27 RX ADMIN — Medication 2: at 11:27

## 2019-01-27 RX ADMIN — ATORVASTATIN CALCIUM 40 MILLIGRAM(S): 80 TABLET, FILM COATED ORAL at 21:29

## 2019-01-27 RX ADMIN — HEPARIN SODIUM 8 UNIT(S)/HR: 5000 INJECTION INTRAVENOUS; SUBCUTANEOUS at 00:15

## 2019-01-27 RX ADMIN — Medication 20 MILLIEQUIVALENT(S): at 12:30

## 2019-01-27 RX ADMIN — Medication 100 MILLIGRAM(S): at 01:15

## 2019-01-27 RX ADMIN — Medication 12.5 MILLIGRAM(S): at 11:40

## 2019-01-27 RX ADMIN — FENTANYL CITRATE 25 MICROGRAM(S): 50 INJECTION INTRAVENOUS at 10:45

## 2019-01-27 RX ADMIN — Medication 2: at 08:02

## 2019-01-27 RX ADMIN — WARFARIN SODIUM 5 MILLIGRAM(S): 2.5 TABLET ORAL at 21:29

## 2019-01-27 RX ADMIN — Medication 62.5 MILLIMOLE(S): at 06:48

## 2019-01-27 RX ADMIN — OXYCODONE AND ACETAMINOPHEN 2 TABLET(S): 5; 325 TABLET ORAL at 22:18

## 2019-01-27 RX ADMIN — HEPARIN SODIUM 8 UNIT(S)/HR: 5000 INJECTION INTRAVENOUS; SUBCUTANEOUS at 06:15

## 2019-01-27 RX ADMIN — Medication 20 MILLIEQUIVALENT(S): at 11:40

## 2019-01-27 NOTE — PROGRESS NOTE ADULT - ASSESSMENT
53F Cypriot hx afib not on AC, HTN, Severe MS, TR, ASD, Cardiac liver cirrhosis,   S/P MVR(t), TV repair, ASD closure, NASIR ligation, POD #1  intra op course complicated with episodes of asystole, post op V pacing, Labile BP requiring Inotropic support, Hypovoumia requiring volume resuscitation    Plan f/u ABGs, Spo2, CXR, extubated maintain all chest tubes, hemodynamically stable.  A-fib back up pacing in place, monitor for post op bleeding, ++ chest tube outputs.   Dobtumine & primacor dced, maintain invasive hemodynamic monitoring    Heparin gtt Warfarin   EP consultation  Asa & statins    Pressors to maintain MAP > 70 f/u perfusion indices, volume resuscitation  Glycemic control,   Tanya op antibiotics.  Sharla the case for cardiac cirrhosis  1/27 pT LOOKING GREAT pod #2 on no pressor or inotropic support, will d/c chest tube today and keep dolores in place.  D/c Intro and howell and transfer to SDU today.

## 2019-01-27 NOTE — PROGRESS NOTE ADULT - ASSESSMENT
52 y/o Female traveler from [facility] with PMhx afib not on AC, htn, p/w c/o chest pain. Patient reports midsternal burning chest pain started a few days ago, constant, gradually worsening worse with cough/deep breath and endorses slight shortness of breath. Radiates to R shoulder. Non exertional/non positional. Denies leg swelling, but notes that she returned on flight from [facility] yesterday (6hr flight). Also notes recent fevers/cough began approximately 1-2 weeks ago.   s/p 1/25/19 TV repair, MVR tissue, ASD repair  Post-op Course:  1/25 Extubated, weaned off pressors  1/26 EP consulted for possible need of PPM with noted pauses during OR time. Patient back to inherent Afib rhythm and is not pacemaker dependent. Continue to monitor.  1/27 transferred to floor, afib 60s, continue on Coumadin and Heparin drip, resumed on beta-blocker.

## 2019-01-27 NOTE — PROGRESS NOTE ADULT - ASSESSMENT
53 Bahraini female with Afib, HTN presents with chest pain, found to have moderate MS, TR, now      Hospital course:  1)1/25:  s/p MVR and TV repair and ASD closure complicated by pauses during OR time   2) EP c/s for possible need of PPM. Patient back to inherent Afib rhythm and is not pacemaker dependent  3) 1/27: Transferred to step down with mediastinal dolores, +PW 53 Czech female with Afib, HTN presents with chest pain, found to have moderate MS, TR, now      Hospital course:  1/25:  s/p MVR and TV repair and ASD closure complicated by pauses during OR time,  Extubated, weaned off pressors  1/26 EP consulted for possible need of PPM with noted pauses during OR time. Patient back to inherent Afib rhythm and is not pacemaker dependent. Continue to monitor.  1/27 transferred to floor, afib 60s, continue on Coumadin and Heparin drip, resumed on beta-blocker.

## 2019-01-27 NOTE — PROGRESS NOTE ADULT - SUBJECTIVE AND OBJECTIVE BOX
Transfer note    VITAL SIGNS  T(F): 98.7  HR: 67  Tele: SR   BP: 130/61  RR: 18  SpO2: 92%    I&O's Summary    2019 07:  -  2019 07:00  --------------------------------------------------------  IN: 1649.9 mL / OUT: 904 mL / NET: 745.9 mL    2019 07:01  -  2019 17:32  --------------------------------------------------------  IN: 36 mL / OUT: 565 mL / NET: -529 mL    LAB                        9.2    10.8  )-----------( 100      ( 2019 01:34 )             26.8   140  |  107  |  12  ----------------------------<  129<H>  4.1   |  23  |  0.81    POCT Blood Glucose.: 126 mg/dL (2019 16:39)  POCT Blood Glucose.: 150 mg/dL (2019 07:58)    DIAGNOSTICS   Xray Chest 1 View- PORTABLE-Routine (19 @ 02:51)   Interval removal of right IJ Plush-Nisha catheter.  Status post median sternotomy and valve replacement.  Mild pulmonary edema and persistent left lower lobe opacity likely   representing effusion.  Cardiomegaly.  IMPRESSION:   Lines and tubes as above. Mild pulmonary edema.      MEDICATIONS  coumadin 5mg  aspirin enteric coated 81 milliGRAM(s) Oral daily  atorvastatin 40 milliGRAM(s) Oral at bedtime  docusate sodium 100 milliGRAM(s) Oral three times a day  furosemide    Tablet 40 milliGRAM(s) Oral daily  heparin  Infusion 600 Unit(s)/Hr IV Continuous <Continuous>  insulin lispro (HumaLOG) corrective regimen sliding scale   SubCutaneous Before meals and at bedtime  losartan 25 milliGRAM(s) Oral every 12 hours  metoprolol tartrate 12.5 milliGRAM(s) Oral daily  pantoprazole    Tablet 40 milliGRAM(s) Oral before breakfast    PHYSICAL EXAM  GEN: No apparent distress, examined OOB to chair, family at bedside  PSYCH: Appropriate, communicative, pleasant  NEURO: Non-focal,  A+Ox 3, MAEx4  CV: S1 S2 without rub or murmur  MEDIASTINUM: Sternal incision covered with dressing, CDI, stable  PACING WIRES:   VVI [x  ]  settin/15      DRAINS:  Terrell [ x ]  Drainage:          PULMONARY:  CTA, Decreased left base   INCENTIVE SPIROMETRY: 500  ABDOMEN:  Soft, non-tender, non-distended, bowel sounds active x 4  LBM:pre op  : voiding   VASCULAR: +pp +radial  SKIN: Warm, dry, intact   leg incision:  ACE [  ]  PHYSICAL THERAPY REC:  Home [  ]  Home w PT [   ]   DAVE [   ]

## 2019-01-27 NOTE — PROGRESS NOTE ADULT - ASSESSMENT
53F Zimbabwean hx afib not on AC, htn p/w chest pain. Patient reports midsternal burning chest pain started a few days ago, constant, gradually worsening worse with cough/deep breath and endorses slight shortness of breath. Radiates to R shoulder. Non exertional/non positional. Denies leg swelling, but notes that she returned on flight from [facility] [place] yesterday (6hr flight). Also notes recent fevers/cough began approximately 1-2 weeks ago. No history of clots, no recent procedures/hospitalizations.    Sev MR  - s/p valve repair   - plan as per CT surgery    liver cirrhosis on CT  - liver sono done  - hepatology follwing    Afib /  HTN  - cw metoprolol  - iv heparin    PULM nodule  - seen by pulm and CT surg  - PET scan as out pt

## 2019-01-27 NOTE — PROGRESS NOTE ADULT - PROBLEM SELECTOR PLAN 1
Continue on asa, lopressor 12.5 daily, statin, losartan 25 BID.  Continue diuresis on lasix 40 daily, daily weights and strict I & Os.  Continue AC on Coumadin with heparin bridge  Cough and deep breathe, Incentive Spirometry Q1h, Chest PT.  Ambulate 4x daily as tolerated and with PT.   C/W GI prophylaxis on protonix.  Pt needs social work consult for discharge - no insurance   Disposition: Home when medically cleared

## 2019-01-27 NOTE — PROGRESS NOTE ADULT - SUBJECTIVE AND OBJECTIVE BOX
Subjective: Pt states "I'm doing ok" denies any CP, SOB, N/V and palpitations. No acute events overnight.     Telemetry:  Afib 60s  Vital Signs Last 24 Hrs  T(C): 37.1 (19 @ 15:33), Max: 38.2 (19 @ 12:15)  T(F): 98.7 (19 @ 15:33), Max: 100.7 (19 @ 12:15)  HR: 67 (19 @ 15:33) (59 - 656)  BP: 130/61 (19 @ 15:33) (130/61 - 158/72)  RR: 18 (19 @ 15:33) (16 - 41)  SpO2: 92% (19 @ 15:33) (90% - 100%)              @ 07:01  -   @ 17:09  --------------------------------------------------------  IN: 36 mL / OUT: 525 mL / NET: -489 mL        Daily Weight in k.7 (2019 00:00)                        9.2    10.8  )-----------( 100      ( 2019 01:34 )             26.8     140  |  107  |  12  ----------------------------<  129<H>  4.1   |  23  |  0.81            CAPILLARY BLOOD GLUCOSE  138 - 150          PHYSICAL EXAM  Neurology: A&Ox3, nonfocal, no gross deficits  CV : RRR+S1S2     +mediastinal dolores with serosanguinous drainage to LWS  Sternal Wound: MSI CDI w/DSD, Stable  +PW - EPM VVI 40  Lungs: Respirations non-labored, B/L BS clear, diminished at bases  Abdomen: Soft, NT/ND, +BSx4Q, negative BM  (-)N/V/D  : Voiding without difficulty  Extremities: B/L LE trace edema, negative calf tenderness, +PP           MEDICATIONS  aspirin enteric coated 81 milliGRAM(s) Oral daily  atorvastatin 40 milliGRAM(s) Oral at bedtime  docusate sodium 100 milliGRAM(s) Oral three times a day  furosemide    Tablet 40 milliGRAM(s) Oral daily  heparin  Infusion 600 Unit(s)/Hr IV Continuous <Continuous>  insulin lispro (HumaLOG) corrective regimen sliding scale   SubCutaneous Before meals and at bedtime  losartan 25 milliGRAM(s) Oral every 12 hours  metoprolol tartrate 12.5 milliGRAM(s) Oral daily  oxyCODONE    5 mG/acetaminophen 325 mG 2 Tablet(s) Oral every 4 hours PRN  pantoprazole    Tablet 40 milliGRAM(s) Oral before breakfast        Physical Therapy Rec:   Home  [ X ]   Home w/ PT  [  ]  Rehab  [  ]    Discussed with Cardiothoracic Team at AM rounds.

## 2019-01-27 NOTE — PROGRESS NOTE ADULT - SUBJECTIVE AND OBJECTIVE BOX
Patient is a 53y old  Female who presents with a chief complaint of 1/25/19 MVR (T) TV Repair ASD Closure NASIR Ligation (27 Jan 2019 10:27)      SUBJECTIVE / OVERNIGHT EVENTS:  seen and examined in CTU.    MEDICATIONS  (STANDING):  aspirin enteric coated 81 milliGRAM(s) Oral daily  atorvastatin 40 milliGRAM(s) Oral at bedtime  dextrose 5%. 1000 milliLiter(s) (50 mL/Hr) IV Continuous <Continuous>  dextrose 50% Injectable 12.5 Gram(s) IV Push once  dextrose 50% Injectable 25 Gram(s) IV Push once  dextrose 50% Injectable 25 Gram(s) IV Push once  dextrose 50% Injectable 50 milliLiter(s) IV Push every 15 minutes  dextrose 50% Injectable 25 milliLiter(s) IV Push every 15 minutes  docusate sodium 100 milliGRAM(s) Oral three times a day  fentaNYL    Injectable 25 MICROGram(s) IV Push once  furosemide    Tablet 40 milliGRAM(s) Oral daily  heparin  Infusion 600 Unit(s)/Hr (8 mL/Hr) IV Continuous <Continuous>  insulin lispro (HumaLOG) corrective regimen sliding scale   SubCutaneous Before meals and at bedtime  losartan 25 milliGRAM(s) Oral every 12 hours  metoclopramide Injectable 10 milliGRAM(s) IV Push every 8 hours  metoprolol tartrate 12.5 milliGRAM(s) Oral daily  pantoprazole    Tablet 40 milliGRAM(s) Oral before breakfast  potassium chloride  10 mEq/50 mL IVPB 10 milliEquivalent(s) IV Intermittent every 1 hour  potassium chloride  10 mEq/50 mL IVPB 10 milliEquivalent(s) IV Intermittent every 1 hour  potassium chloride  10 mEq/50 mL IVPB 10 milliEquivalent(s) IV Intermittent every 1 hour  sodium chloride 0.9%. 1000 milliLiter(s) (10 mL/Hr) IV Continuous <Continuous>    MEDICATIONS  (PRN):  dextrose 40% Gel 15 Gram(s) Oral once PRN Blood Glucose LESS THAN 70 milliGRAM(s)/deciliter  glucagon  Injectable 1 milliGRAM(s) IntraMuscular once PRN Glucose LESS THAN 70 milligrams/deciliter  oxyCODONE    5 mG/acetaminophen 325 mG 2 Tablet(s) Oral every 4 hours PRN Moderate Pain (4 - 6)      Vital Signs Last 24 Hrs  T(C): 36.6 (27 Jan 2019 08:00), Max: 38 (26 Jan 2019 22:00)  T(F): 97.9 (27 Jan 2019 08:00), Max: 100.4 (26 Jan 2019 22:00)  HR: 86 (27 Jan 2019 09:00) (59 - 86)  BP: 158/72 (27 Jan 2019 07:00) (123/59 - 158/72)  BP(mean): 108 (27 Jan 2019 07:00) (84 - 108)  RR: 34 (27 Jan 2019 09:00) (16 - 41)  SpO2: 99% (27 Jan 2019 09:00) (90% - 100%)  CAPILLARY BLOOD GLUCOSE  150 (27 Jan 2019 08:00)  138 (26 Jan 2019 22:00)      POCT Blood Glucose.: 150 mg/dL (27 Jan 2019 07:58)    I&O's Summary    26 Jan 2019 07:01  -  27 Jan 2019 07:00  --------------------------------------------------------  IN: 1649.9 mL / OUT: 904 mL / NET: 745.9 mL    27 Jan 2019 07:01  -  27 Jan 2019 10:59  --------------------------------------------------------  IN: 36 mL / OUT: 350 mL / NET: -314 mL        PHYSICAL EXAM:  GENERAL: NAD, well-developed  HEAD:  Atraumatic, Normocephalic  EYES: EOMI, PERRLA, conjunctiva and sclera clear  NECK: Supple, No JVD  CHEST/LUNG: Clear to auscultation bilaterally; No wheeze  HEART: Regular rate and rhythm; No murmurs, rubs, or gallops  ABDOMEN: Soft, Nontender, Nondistended; Bowel sounds present  EXTREMITIES:  2+ Peripheral Pulses, No clubbing, cyanosis, or edema  PSYCH: AAOx3  NEUROLOGY: non-focal  SKIN: No rashes or lesions    LABS:                        9.2    10.8  )-----------( 100      ( 27 Jan 2019 01:34 )             26.8     01-27    140  |  107  |  12  ----------------------------<  129<H>  4.1   |  23  |  0.81    Ca    8.9      27 Jan 2019 01:34  Phos  2.4     01-27  Mg     2.1     01-27    TPro  6.5  /  Alb  4.2  /  TBili  1.4<H>  /  DBili  x   /  AST  44<H>  /  ALT  21  /  AlkPhos  57  01-27    PT/INR - ( 26 Jan 2019 15:19 )   PT: 20.6 sec;   INR: 1.76 ratio         PTT - ( 27 Jan 2019 06:16 )  PTT:54.0 sec  CARDIAC MARKERS ( 25 Jan 2019 14:40 )  x     / x     / 253 U/L / x     / 20.2 ng/mL          RADIOLOGY & ADDITIONAL TESTS:    Imaging Personally Reviewed:    Consultant(s) Notes Reviewed:      Care Discussed with Consultants/Other Providers:

## 2019-01-27 NOTE — PROGRESS NOTE ADULT - SUBJECTIVE AND OBJECTIVE BOX
Progress Note:   · Provider Specialty	Cardiology	    Reason for Admission:    Reason for Admission:  · Reason for Admission	chest pain	      · Subjective and Objective: 	  Referring: Nina Bellamy  Reason for Referral: SOB/Mitral Valve Disease    Patient seen and examined at bedside.  =====================  Interval Events:   - No complaints this morning. Denies CP, SOB, LE edema.   - Doing well post op  =====================================    REVIEW OF SYSTEMS:  Constitutional:     No fevers, chills, weight loss, weight gain  HEENT:                  No dry eyes, nasal congestion, postnasal drip  CV:                         No chest pain, palpitations, orthopnea, PND  Resp:                     No cough, SOB, dyspnea, wheezing, sputum  GI:                          No nausea, vomiting, abdominal pain, diarrhea, constipation  :                        No dysuria, nocturia, hematuria, increased urinary frequency  Musculoskeletal: No back pain, myalgias, arthralgias   Skin:                       No rash, pruritus, ecchymoses  Neurological:        No headache, dizziness, syncope, weakness, numbness  Psychiatric:           No anxiety, depression   Endocrine:            No hot/cold intolerance, polydipsia  Heme/Lymph:      No bleeding, easy bruising  Allergic/Immune: No itchy eyes, rhinorrhea, hives angioedema      Current Meds:  atorvastatin 40 milliGRAM(s) Oral at bedtime  benzonatate 100 milliGRAM(s) Oral three times a day PRN  carvedilol 3.125 milliGRAM(s) Oral every 12 hours  cefuroxime  IVPB 1500 milliGRAM(s) IV Intermittent once  chlorhexidine 0.12% Liquid 30 milliLiter(s) Swish and Spit once  chlorhexidine 4% Liquid 1 Application(s) Topical once  heparin  Infusion. 900 Unit(s)/Hr IV Continuous <Continuous>  heparin  Injectable 6500 Unit(s) IV Push every 6 hours PRN  heparin  Injectable 3000 Unit(s) IV Push every 6 hours PRN  influenza   Vaccine 0.5 milliLiter(s) IntraMuscular once  isosorbide   mononitrate ER Tablet (IMDUR) 30 milliGRAM(s) Oral daily  LORazepam     Tablet 0.5 milliGRAM(s) Oral at bedtime PRN      PAST MEDICAL & SURGICAL HISTORY:  HTN (hypertension)  Afib  No significant past surgical history      Vitals:  T(F): 98.6 (01-24), Max: 98.6 (01-23)  HR: 74 (01-24) (71 - 84)  BP: 147/91 (01-24) (142/90 - 162/98)  RR: 18 (01-24)  SpO2: 97% (01-24)  I&O's Summary    23 Jan 2019 07:01  -  24 Jan 2019 07:00  --------------------------------------------------------  IN: 708 mL / OUT: 300 mL / NET: 408 mL        Physical Exam:  Appearance: No acute distress; well appearing, obese  Eyes: PERRL, EOMI, pink conjunctiva  HENT: Normal oral mucosa  Cardiovascular: irregularly irregular S1, S2, +Sternotomy  Respiratory: diminished in bases  Gastrointestinal: soft, non-tender, non-distended with normal bowel sounds  Musculoskeletal: No clubbing; no joint deformity   Neurologic: Non-focal  Lymphatic: No lymphadenopathy  Psychiatry: AAOx3, mood & affect appropriate  Skin: No rashes, ecchymoses, or cyanosis    Labs Personally Reviewed 1/ 27/2019                          13.8   7.6   )-----------( 246      ( 24 Jan 2019 06:18 )             40.7     01-23    142  |  105  |  12  ----------------------------<  102<H>  3.6   |  24  |  0.59    Ca    9.4      23 Jan 2019 05:35  Mg     1.9     01-23    TPro  7.5  /  Alb  4.0  /  TBili  1.0  /  DBili  x   /  AST  19  /  ALT  18  /  AlkPhos  93  01-23    PTT - ( 24 Jan 2019 06:18 )  PTT:75.2 sec      Serum Pro-Brain Natriuretic Peptide: 815 pg/mL (01-23 @ 18:43)      Hemoglobin A1C, Whole Blood: 5.8 % (01-23 @ 23:14)      New ECG(s): Personally reviewed    Echo:    Stress Testing:     Cath:    Imaging:    Interpretation of Telemetry: AFib, 2-4 sec pauses      Assessment and Plan:   · Assessment		  53 [place] female with Afib, HTN who presented with chest pain. LHC with clean coronaries and severe MS s/p MVR  - continue current management   - continue lopressor  - no need for PPM  - coumadin  - hep gtt  - cont asa  - mobilization

## 2019-01-27 NOTE — PROGRESS NOTE ADULT - SUBJECTIVE AND OBJECTIVE BOX
54 yo F s/p MVR (T) now seen POD #2.  POst op course complicated by prolonged inotropic support, greater than 24 hrs.  Pt now seen in AM OOB to chair doing well in NAD.  No new issues overnight.  Prior h/o Afib on no AC, hepatic sclerosis rheumatic heart disease.   M.S.      VITAL SIGNS    Telemetry:  75- 80 NSR     Vital Signs Last 24 Hrs  T(C): 36.6 (19 @ 08:00), Max: 38 (19 @ 22:00)  T(F): 97.9 (19 @ 08:00), Max: 100.4 (19 @ 22:00)  HR: 82 (19 @ 08:00) (59 - 86)  BP: 158/72 (19 @ 07:00) (116/59 - 158/72)  RR: 20 (19 @ 08:00) (16 - 41)  SpO2: 99% (19 @ 08:00) (90% - 100%)                    @ 07: @ 07:00  --------------------------------------------------------  IN: 1649.9 mL / OUT: 904 mL / NET: 745.9 mL     @ 07: @ 10:28  --------------------------------------------------------  IN: 36 mL / OUT: 350 mL / NET: -314 mL          Daily     Daily Weight in k.7 (2019 00:00)        CAPILLARY BLOOD GLUCOSE  150 (2019 08:00)  138 (2019 22:00)      POCT Blood Glucose.: 150 mg/dL (2019 07:58)          Drains:     MS         [X] Drainage:    170  x 24 hrs removed          	   MS Terrell  79 cc       Pacing Wires        [ X ]   Settings:         VVI 40ppm                       Coumadin    [ ] YES          [  ]      NO         Reason:                               PHYSICAL EXAM    Neurology: alert and oriented x 3, nonfocal, no gross deficits  CV :  RRR no audible murmur  Sternal Wound :  CDI , Stable  Lungs:  CTA B/L with decreased BS bilaterally  Abdomen: soft, nontender, nondistended, positive bowel sounds, last bowel movement   :   normal anatomy howell in place          Extremities:     FROM X 4 +1 EDEMA           aspirin enteric coated 81 milliGRAM(s) Oral daily  atorvastatin 40 milliGRAM(s) Oral at bedtime  dextrose 40% Gel 15 Gram(s) Oral once PRN  dextrose 5%. 1000 milliLiter(s) IV Continuous <Continuous>  dextrose 50% Injectable 12.5 Gram(s) IV Push once  dextrose 50% Injectable 25 Gram(s) IV Push once  dextrose 50% Injectable 25 Gram(s) IV Push once  dextrose 50% Injectable 50 milliLiter(s) IV Push every 15 minutes  dextrose 50% Injectable 25 milliLiter(s) IV Push every 15 minutes  docusate sodium 100 milliGRAM(s) Oral three times a day  furosemide    Tablet 40 milliGRAM(s) Oral daily  glucagon  Injectable 1 milliGRAM(s) IntraMuscular once PRN  heparin  Infusion 600 Unit(s)/Hr IV Continuous <Continuous>  insulin lispro (HumaLOG) corrective regimen sliding scale   SubCutaneous Before meals and at bedtime  losartan 25 milliGRAM(s) Oral every 12 hours  metoclopramide Injectable 10 milliGRAM(s) IV Push every 8 hours  metoprolol tartrate 12.5 milliGRAM(s) Oral daily  pantoprazole    Tablet 40 milliGRAM(s) Oral before breakfast  potassium chloride  10 mEq/50 mL IVPB 10 milliEquivalent(s) IV Intermittent every 1 hour  potassium chloride  10 mEq/50 mL IVPB 10 milliEquivalent(s) IV Intermittent every 1 hour  potassium chloride  10 mEq/50 mL IVPB 10 milliEquivalent(s) IV Intermittent every 1 hour  sodium chloride 0.9%. 1000 milliLiter(s) IV Continuous <Continuous>                    Physical Therapy Rec:   Home  [x]   Home w/ PT  [  ]  Rehab  [  ]  Discussed with Cardiothoracic Team at AM rounds.

## 2019-01-27 NOTE — PROGRESS NOTE ADULT - PROBLEM SELECTOR PLAN 1
transferred to 2C step down  Pulmonary toileting  Pain management  Progressive activity  Shower post op day 5

## 2019-01-28 LAB
ANION GAP SERPL CALC-SCNC: 13 MMOL/L — SIGNIFICANT CHANGE UP (ref 5–17)
APTT BLD: 50.6 SEC — HIGH (ref 27.5–36.3)
BUN SERPL-MCNC: 9 MG/DL — SIGNIFICANT CHANGE UP (ref 7–23)
CALCIUM SERPL-MCNC: 9.4 MG/DL — SIGNIFICANT CHANGE UP (ref 8.4–10.5)
CHLORIDE SERPL-SCNC: 105 MMOL/L — SIGNIFICANT CHANGE UP (ref 96–108)
CO2 SERPL-SCNC: 23 MMOL/L — SIGNIFICANT CHANGE UP (ref 22–31)
CREAT SERPL-MCNC: 0.74 MG/DL — SIGNIFICANT CHANGE UP (ref 0.5–1.3)
GLUCOSE BLDC GLUCOMTR-MCNC: 106 MG/DL — HIGH (ref 70–99)
GLUCOSE BLDC GLUCOMTR-MCNC: 114 MG/DL — HIGH (ref 70–99)
GLUCOSE BLDC GLUCOMTR-MCNC: 117 MG/DL — HIGH (ref 70–99)
GLUCOSE BLDC GLUCOMTR-MCNC: 83 MG/DL — SIGNIFICANT CHANGE UP (ref 70–99)
GLUCOSE SERPL-MCNC: 105 MG/DL — HIGH (ref 70–99)
HCT VFR BLD CALC: 28.1 % — LOW (ref 34.5–45)
HGB BLD-MCNC: 9.7 G/DL — LOW (ref 11.5–15.5)
INR BLD: 1.46 RATIO — HIGH (ref 0.88–1.16)
MCHC RBC-ENTMCNC: 31.1 PG — SIGNIFICANT CHANGE UP (ref 27–34)
MCHC RBC-ENTMCNC: 34.5 GM/DL — SIGNIFICANT CHANGE UP (ref 32–36)
MCV RBC AUTO: 90.1 FL — SIGNIFICANT CHANGE UP (ref 80–100)
PLATELET # BLD AUTO: 120 K/UL — LOW (ref 150–400)
POTASSIUM SERPL-MCNC: 3.9 MMOL/L — SIGNIFICANT CHANGE UP (ref 3.5–5.3)
POTASSIUM SERPL-SCNC: 3.9 MMOL/L — SIGNIFICANT CHANGE UP (ref 3.5–5.3)
PROTHROM AB SERPL-ACNC: 16.8 SEC — HIGH (ref 10–12.9)
RBC # BLD: 3.12 M/UL — LOW (ref 3.8–5.2)
RBC # FLD: 13.3 % — SIGNIFICANT CHANGE UP (ref 10.3–14.5)
SODIUM SERPL-SCNC: 141 MMOL/L — SIGNIFICANT CHANGE UP (ref 135–145)
WBC # BLD: 11 K/UL — HIGH (ref 3.8–10.5)
WBC # FLD AUTO: 11 K/UL — HIGH (ref 3.8–10.5)

## 2019-01-28 PROCEDURE — 99232 SBSQ HOSP IP/OBS MODERATE 35: CPT | Mod: GC

## 2019-01-28 PROCEDURE — 33274 TCAT INSJ/RPL PERM LDLS PM: CPT

## 2019-01-28 PROCEDURE — 71045 X-RAY EXAM CHEST 1 VIEW: CPT | Mod: 26,77

## 2019-01-28 PROCEDURE — 71045 X-RAY EXAM CHEST 1 VIEW: CPT | Mod: 26

## 2019-01-28 PROCEDURE — 93010 ELECTROCARDIOGRAM REPORT: CPT

## 2019-01-28 RX ORDER — HEPARIN SODIUM 5000 [USP'U]/ML
800 INJECTION INTRAVENOUS; SUBCUTANEOUS
Qty: 25000 | Refills: 0 | Status: DISCONTINUED | OUTPATIENT
Start: 2019-01-28 | End: 2019-01-30

## 2019-01-28 RX ORDER — METOPROLOL TARTRATE 50 MG
12.5 TABLET ORAL EVERY 12 HOURS
Qty: 0 | Refills: 0 | Status: DISCONTINUED | OUTPATIENT
Start: 2019-01-29 | End: 2019-01-29

## 2019-01-28 RX ORDER — WARFARIN SODIUM 2.5 MG/1
5 TABLET ORAL ONCE
Qty: 0 | Refills: 0 | Status: COMPLETED | OUTPATIENT
Start: 2019-01-28 | End: 2019-01-28

## 2019-01-28 RX ADMIN — Medication 20 MILLIEQUIVALENT(S): at 12:47

## 2019-01-28 RX ADMIN — Medication 81 MILLIGRAM(S): at 12:47

## 2019-01-28 RX ADMIN — OXYCODONE AND ACETAMINOPHEN 2 TABLET(S): 5; 325 TABLET ORAL at 20:09

## 2019-01-28 RX ADMIN — Medication 100 MILLIGRAM(S): at 21:39

## 2019-01-28 RX ADMIN — Medication 12.5 MILLIGRAM(S): at 06:35

## 2019-01-28 RX ADMIN — LOSARTAN POTASSIUM 25 MILLIGRAM(S): 100 TABLET, FILM COATED ORAL at 06:35

## 2019-01-28 RX ADMIN — OXYCODONE AND ACETAMINOPHEN 2 TABLET(S): 5; 325 TABLET ORAL at 20:39

## 2019-01-28 RX ADMIN — SENNA PLUS 2 TABLET(S): 8.6 TABLET ORAL at 21:39

## 2019-01-28 RX ADMIN — LOSARTAN POTASSIUM 25 MILLIGRAM(S): 100 TABLET, FILM COATED ORAL at 19:53

## 2019-01-28 RX ADMIN — WARFARIN SODIUM 5 MILLIGRAM(S): 2.5 TABLET ORAL at 21:39

## 2019-01-28 RX ADMIN — PANTOPRAZOLE SODIUM 40 MILLIGRAM(S): 20 TABLET, DELAYED RELEASE ORAL at 06:35

## 2019-01-28 RX ADMIN — Medication 40 MILLIGRAM(S): at 06:35

## 2019-01-28 RX ADMIN — ATORVASTATIN CALCIUM 40 MILLIGRAM(S): 80 TABLET, FILM COATED ORAL at 21:39

## 2019-01-28 NOTE — PROGRESS NOTE ADULT - SUBJECTIVE AND OBJECTIVE BOX
24H hour events:   Seen sitting in chair; intermittent loss of capture overnight    MEDICATIONS:  aspirin enteric coated 81 milliGRAM(s) Oral daily  furosemide    Tablet 40 milliGRAM(s) Oral daily  heparin  Infusion 600 Unit(s)/Hr IV Continuous <Continuous>  losartan 25 milliGRAM(s) Oral every 12 hours  oxyCODONE    5 mG/acetaminophen 325 mG 2 Tablet(s) Oral every 4 hours PRN  docusate sodium 100 milliGRAM(s) Oral three times a day  pantoprazole    Tablet 40 milliGRAM(s) Oral before breakfast  polyethylene glycol 3350 17 Gram(s) Oral daily  senna 2 Tablet(s) Oral at bedtime  atorvastatin 40 milliGRAM(s) Oral at bedtime  dextrose 40% Gel 15 Gram(s) Oral once PRN  dextrose 50% Injectable 12.5 Gram(s) IV Push once  dextrose 50% Injectable 25 Gram(s) IV Push once  dextrose 50% Injectable 25 Gram(s) IV Push once  dextrose 50% Injectable 50 milliLiter(s) IV Push every 15 minutes  dextrose 50% Injectable 25 milliLiter(s) IV Push every 15 minutes  glucagon  Injectable 1 milliGRAM(s) IntraMuscular once PRN  insulin lispro (HumaLOG) corrective regimen sliding scale   SubCutaneous Before meals and at bedtime  dextrose 5%. 1000 milliLiter(s) IV Continuous <Continuous>  potassium chloride    Tablet ER 20 milliEquivalent(s) Oral daily  potassium chloride  10 mEq/50 mL IVPB 10 milliEquivalent(s) IV Intermittent every 1 hour  potassium chloride  10 mEq/50 mL IVPB 10 milliEquivalent(s) IV Intermittent every 1 hour  potassium chloride  10 mEq/50 mL IVPB 10 milliEquivalent(s) IV Intermittent every 1 hour  sodium chloride 0.9%. 1000 milliLiter(s) IV Continuous <Continuous>      REVIEW OF SYSTEMS:  Constitutional: no fever or chills  Neuro: (+) headache, no dizziness, or headache  Respiratory: no sob or cough or wheezing  Cardiac: no chest pain or palpitations  GI: no nausea or vomiting or abdominal pain  : no dysuria or hematuria  Ext: (+) weakness    PHYSICAL EXAM:  T(C): 37.1 (01-28-19 @ 07:10), Max: 38.2 (01-27-19 @ 12:15)  HR: 75 (01-28-19 @ 07:10) (22 - 656)  BP: 152/70 (01-28-19 @ 07:10) (130/61 - 153/66)  RR: 18 (01-28-19 @ 07:10) (16 - 31)  SpO2: 96% (01-28-19 @ 07:10) (92% - 99%)  Wt(kg): --  I&O's Summary    27 Jan 2019 07:01  -  28 Jan 2019 07:00  --------------------------------------------------------  IN: 440 mL / OUT: 1975 mL / NET: -1535 mL        Appearance: Normal, in NAD  HEENT: Normocephalic, atraumatic, neck supple, normal oral pharynx  Cardiovascular: irregular S1 S2, No JVD, No murmurs, Temporary wire set at 45 bpm, mA 18; pigtail to pleurevac 60 cc drainage  Respiratory: Lungs clear to auscultation, slightly diminished at bases  Psychiatry: A & O x 3, Mood & affect appropriate  Gastrointestinal:  Soft, Non-tender, + BS	  Skin: No rashes, No ecchymoses, No cyanosis	  Neurologic: Non-focal  Extremities: Normal range of motion, No clubbing, cyanosis or edema  Vascular: Peripheral pulses palpable 2+ bilaterally        LABS:	 	    CBC Full  -  ( 28 Jan 2019 06:06 )  WBC Count : 11.0 K/uL  Hemoglobin : 9.7 g/dL  Hematocrit : 28.1 %  Platelet Count - Automated : 120 K/uL  Mean Cell Volume : 90.1 fl  Mean Cell Hemoglobin : 31.1 pg  Mean Cell Hemoglobin Concentration : 34.5 gm/dL  Auto Neutrophil # : x  Auto Lymphocyte # : x  Auto Monocyte # : x  Auto Eosinophil # : x  Auto Basophil # : x  Auto Neutrophil % : x  Auto Lymphocyte % : x  Auto Monocyte % : x  Auto Eosinophil % : x  Auto Basophil % : x    01-28    141  |  105  |  9   ----------------------------<  105<H>  3.9   |  23  |  0.74  01-27    140  |  107  |  12  ----------------------------<  129<H>  4.1   |  23  |  0.81    Ca    9.4      28 Jan 2019 06:06  Ca    8.9      27 Jan 2019 01:34  Phos  2.4     01-27  Mg     2.1     01-27    TPro  6.5  /  Alb  4.2  /  TBili  1.4<H>  /  DBili  x   /  AST  44<H>  /  ALT  21  /  AlkPhos  57  01-27      proBNP: Serum Pro-Brain Natriuretic Peptide: 815 pg/mL (01-23 @ 18:43)      TELEMETRY: Atrial Fibrillation, had intermittent loss of capture overnight

## 2019-01-28 NOTE — PROGRESS NOTE ADULT - ASSESSMENT
53F [place] hx afib not on AC, htn p/w chest pain. Patient reports midsternal burning chest pain started a few days ago, constant, gradually worsening worse with cough/deep breath and endorses slight shortness of breath. Radiates to R shoulder. Non exertional/non positional. Denies leg swelling, but notes that she returned on flight from [facility] yesterday (6hr flight). Also notes recent fevers/cough began approximately 1-2 weeks ago. No history of clots, no recent procedures/hospitalizations.    Sev MR  - s/p valve repair   - plan as per CT surgery    liver cirrhosis on CT  - liver sono done  - follow up with hepatology     Afib /  HTN  - cw metoprolol  - iv heparin    PULM nodule  - seen by pulm and CT surg  - PET scan as out pt

## 2019-01-28 NOTE — PROGRESS NOTE ADULT - ASSESSMENT
53 Maltese female with Afib, HTN presents with chest pain, found to have moderate MS, TR, now      Hospital course:  1/25:  s/p MVR and TV repair and ASD closure complicated by pauses during OR time,  Extubated, weaned off pressors  1/26 EP consulted for possible need of PPM with noted pauses during OR time. Patient back to inherent Afib rhythm and is not pacemaker dependent. Continue to monitor.  1/27 transferred to floor, afib 60s, continue on Coumadin and Heparin drip, resumed on beta-blocker.  1/28 Low dose beta blocker for cad and rate control, pt with mata overnight, pacer not capturing. Low HR of 28 today.  Alternate v   wire attached, pacer at vvi 45, good capture at MA of 18.  D/w eps, PPM later today, pt npo

## 2019-01-28 NOTE — PROGRESS NOTE ADULT - SUBJECTIVE AND OBJECTIVE BOX
Patient is a 53y old  Female who presents with a chief complaint of chest pain (28 Jan 2019 10:44)      SUBJECTIVE / OVERNIGHT EVENTS:  ambulating with PT.  No chest pain. No shortness of breath. No complaints. No events overnight.     MEDICATIONS  (STANDING):  aspirin enteric coated 81 milliGRAM(s) Oral daily  atorvastatin 40 milliGRAM(s) Oral at bedtime  dextrose 5%. 1000 milliLiter(s) (50 mL/Hr) IV Continuous <Continuous>  dextrose 50% Injectable 12.5 Gram(s) IV Push once  dextrose 50% Injectable 25 Gram(s) IV Push once  dextrose 50% Injectable 25 Gram(s) IV Push once  dextrose 50% Injectable 50 milliLiter(s) IV Push every 15 minutes  dextrose 50% Injectable 25 milliLiter(s) IV Push every 15 minutes  docusate sodium 100 milliGRAM(s) Oral three times a day  furosemide    Tablet 40 milliGRAM(s) Oral daily  heparin  Infusion 600 Unit(s)/Hr (8 mL/Hr) IV Continuous <Continuous>  insulin lispro (HumaLOG) corrective regimen sliding scale   SubCutaneous Before meals and at bedtime  losartan 25 milliGRAM(s) Oral every 12 hours  pantoprazole    Tablet 40 milliGRAM(s) Oral before breakfast  polyethylene glycol 3350 17 Gram(s) Oral daily  potassium chloride    Tablet ER 20 milliEquivalent(s) Oral daily  potassium chloride  10 mEq/50 mL IVPB 10 milliEquivalent(s) IV Intermittent every 1 hour  potassium chloride  10 mEq/50 mL IVPB 10 milliEquivalent(s) IV Intermittent every 1 hour  potassium chloride  10 mEq/50 mL IVPB 10 milliEquivalent(s) IV Intermittent every 1 hour  senna 2 Tablet(s) Oral at bedtime  sodium chloride 0.9%. 1000 milliLiter(s) (10 mL/Hr) IV Continuous <Continuous>    MEDICATIONS  (PRN):  dextrose 40% Gel 15 Gram(s) Oral once PRN Blood Glucose LESS THAN 70 milliGRAM(s)/deciliter  glucagon  Injectable 1 milliGRAM(s) IntraMuscular once PRN Glucose LESS THAN 70 milligrams/deciliter  oxyCODONE    5 mG/acetaminophen 325 mG 2 Tablet(s) Oral every 4 hours PRN Moderate Pain (4 - 6)      Vital Signs Last 24 Hrs  T(C): 37.1 (28 Jan 2019 07:10), Max: 38.2 (27 Jan 2019 12:15)  T(F): 98.7 (28 Jan 2019 07:10), Max: 100.7 (27 Jan 2019 12:15)  HR: 75 (28 Jan 2019 07:10) (67 - 656)  BP: 152/70 (28 Jan 2019 07:10) (130/61 - 153/66)  BP(mean): 96 (28 Jan 2019 03:15) (88 - 96)  RR: 18 (28 Jan 2019 07:10) (16 - 20)  SpO2: 96% (28 Jan 2019 07:10) (92% - 96%)  CAPILLARY BLOOD GLUCOSE      POCT Blood Glucose.: 106 mg/dL (28 Jan 2019 07:45)  POCT Blood Glucose.: 111 mg/dL (27 Jan 2019 21:41)  POCT Blood Glucose.: 126 mg/dL (27 Jan 2019 16:39)    I&O's Summary    27 Jan 2019 07:01  -  28 Jan 2019 07:00  --------------------------------------------------------  IN: 440 mL / OUT: 1975 mL / NET: -1535 mL    28 Jan 2019 07:01  -  28 Jan 2019 11:10  --------------------------------------------------------  IN: 360 mL / OUT: 600 mL / NET: -240 mL        PHYSICAL EXAM:  GENERAL: NAD, well-developed  HEAD:  Atraumatic, Normocephalic  EYES: EOMI, PERRLA, conjunctiva and sclera clear  NECK: Supple, No JVD  CHEST/LUNG: Clear to auscultation bilaterally; No wheeze  HEART: Regular rate and rhythm; No murmurs, rubs, or gallops  ABDOMEN: Soft, Nontender, Nondistended; Bowel sounds present  EXTREMITIES:  2+ Peripheral Pulses, No clubbing, cyanosis, or edema  PSYCH: AAOx3  NEUROLOGY: non-focal  SKIN: No rashes or lesions    LABS:                        9.7    11.0  )-----------( 120      ( 28 Jan 2019 06:06 )             28.1     01-28    141  |  105  |  9   ----------------------------<  105<H>  3.9   |  23  |  0.74    Ca    9.4      28 Jan 2019 06:06  Phos  2.4     01-27  Mg     2.1     01-27    TPro  6.5  /  Alb  4.2  /  TBili  1.4<H>  /  DBili  x   /  AST  44<H>  /  ALT  21  /  AlkPhos  57  01-27    PT/INR - ( 28 Jan 2019 06:06 )   PT: 16.8 sec;   INR: 1.46 ratio         PTT - ( 28 Jan 2019 06:06 )  PTT:50.6 sec          RADIOLOGY & ADDITIONAL TESTS:    Imaging Personally Reviewed:    Consultant(s) Notes Reviewed:      Care Discussed with Consultants/Other Providers:

## 2019-01-28 NOTE — PROGRESS NOTE ADULT - ASSESSMENT
52 y/o German female with Afib, HTN presents with chest pain, found to have moderate MS, TR, now s/p MVR and TV repair and ASD closure, with noted pauses during OR time. EP c/s for possible need of PPM. Patient back to inherent Afib rhythm and is not pacemaker dependent. she has a reported hx of cirrhosis per primary team , and is on coreg at baseline.

## 2019-01-28 NOTE — PROGRESS NOTE ADULT - PROBLEM SELECTOR PLAN 1
Patient with intermittent episodes of loss of capture overnight, with ventricular rates  of 20-30  bpm  Continue telemetry monitoring  Keep patient NPO today for Micra PPM implant  Procedure d/w patient and daughter  Monitor BMP levels, supplement as needed

## 2019-01-28 NOTE — PROGRESS NOTE ADULT - SUBJECTIVE AND OBJECTIVE BOX
VITAL SIGNS    Telemetry:  afib 60 low to 40 overnight  Vital Signs Last 24 Hrs  T(C): 37.1 (19 @ 07:10), Max: 38.2 (19 @ 12:15)  T(F): 98.7 (19 @ 07:10), Max: 100.7 (19 @ 12:15)  HR: 75 (19 @ 07:10) (67 - 656)  BP: 152/70 (19 @ 07:10) (130/61 - 153/66)  RR: 18 (19 @ 07:10) (16 - 31)  SpO2: 96% (19 @ 07:10) (92% - 98%)                       9.7<L>                141  | 23   | 9            11.0<H> >-----------< 120<L>   ------------------------< 105<H>                 28.1<L>                3.9  | 105  | 0.74                                                                      Ca 9.4   Mg x     Ph x        ,             9.2<L>                140  | 23   | 12           10.8<H> >-----------< 100<L>   ------------------------< 129<H>                 26.8<L>                4.1  | 107  | 0.81                                                                      Ca 8.9   Mg 2.1   Ph 2.4<L>              Daily     Daily Weight in k.2 (2019 06:15)        CAPILLARY BLOOD GLUCOSE      POCT Blood Glucose.: 106 mg/dL (2019 07:45)  POCT Blood Glucose.: 111 mg/dL (2019 21:41)  POCT Blood Glucose.: 126 mg/dL (2019 16:39)                Coumadin    [ ] YES          [  ]      NO                                   PHYSICAL EXAM        Neurology: alert and oriented x 3, nonfocal, no gross deficits  CV : .S1S2 RRR  Sternal Wound :  CDI , Stable  Pacing Wires        [x  ]   Settings:          vvi 45/18                      Isolated  [  ]  Lungs:  diminished  Drains:     MS         [x  ] Drainage: 60/100             L Pleural  [  ]  Drainage:                R Pleural  [  ]  Drainage:  Abdomen: soft, nontender, nondistended, positive bowel sounds, last bowel movement +  :         voiding    Extremities:     trace edema  - calf tenderness          aspirin enteric coated 81 milliGRAM(s) Oral daily  atorvastatin 40 milliGRAM(s) Oral at bedtime  dextrose 40% Gel 15 Gram(s) Oral once PRN  dextrose 5%. 1000 milliLiter(s) IV Continuous <Continuous>  dextrose 50% Injectable 12.5 Gram(s) IV Push once  dextrose 50% Injectable 25 Gram(s) IV Push once  dextrose 50% Injectable 25 Gram(s) IV Push once  dextrose 50% Injectable 50 milliLiter(s) IV Push every 15 minutes  dextrose 50% Injectable 25 milliLiter(s) IV Push every 15 minutes  docusate sodium 100 milliGRAM(s) Oral three times a day  furosemide    Tablet 40 milliGRAM(s) Oral daily  glucagon  Injectable 1 milliGRAM(s) IntraMuscular once PRN  heparin  Infusion 600 Unit(s)/Hr IV Continuous <Continuous>  insulin lispro (HumaLOG) corrective regimen sliding scale   SubCutaneous Before meals and at bedtime  losartan 25 milliGRAM(s) Oral every 12 hours  oxyCODONE    5 mG/acetaminophen 325 mG 2 Tablet(s) Oral every 4 hours PRN  pantoprazole    Tablet 40 milliGRAM(s) Oral before breakfast  polyethylene glycol 3350 17 Gram(s) Oral daily  potassium chloride    Tablet ER 20 milliEquivalent(s) Oral daily  potassium chloride  10 mEq/50 mL IVPB 10 milliEquivalent(s) IV Intermittent every 1 hour  potassium chloride  10 mEq/50 mL IVPB 10 milliEquivalent(s) IV Intermittent every 1 hour  potassium chloride  10 mEq/50 mL IVPB 10 milliEquivalent(s) IV Intermittent every 1 hour  senna 2 Tablet(s) Oral at bedtime  sodium chloride 0.9%. 1000 milliLiter(s) IV Continuous <Continuous>                    Physical Therapy Rec:   Home  [  ]   Home w/ PT  [  ]  Rehab  [  ]  Discussed with Cardiothoracic Team at AM rounds.

## 2019-01-28 NOTE — PROGRESS NOTE ADULT - ASSESSMENT
Impression:  # Cirrhosis - MELD-Na: 9 (on admission)  HE: AOx3, no history  Ascites: None on US 1/23/19  HCC: No focal lesions on US 1/23/19  Varices: Needs to be screened  # Afib (not on AC)  # Pulmonary Nodule: Pulm recommending outpatient followup with PET and PFTs  # Hypertension    Recommendations:  - Outpatient EGD for variceal screening  - Please send NICOLE, AMA, ASMA, alpha 1 antitrypsin, ceruloplasmin, soluble liver antigen, anti LKM-1 Ab, immunoglobulin panel, iron panel (ferritin, iron, TIBC)  - Supportive care per primary team    Lidia Villareal MD  Gastroenterology Fellow  [phone]  Please page on call fellow on weekends and after 5pm on weekdays

## 2019-01-28 NOTE — PROGRESS NOTE ADULT - SUBJECTIVE AND OBJECTIVE BOX
Chief Complaint:  Patient is a 53y old  Female who presents with a chief complaint of chest pain (2019 11:10)    Interval Events:   Seen and examined this morning  Denies nausea, vomiting, diarrhea, abdominal pain,    Allergies:  No Known Allergies    Hospital Medications:  aspirin enteric coated 81 milliGRAM(s) Oral daily  atorvastatin 40 milliGRAM(s) Oral at bedtime  dextrose 40% Gel 15 Gram(s) Oral once PRN  dextrose 5%. 1000 milliLiter(s) IV Continuous <Continuous>  dextrose 50% Injectable 12.5 Gram(s) IV Push once  dextrose 50% Injectable 25 Gram(s) IV Push once  dextrose 50% Injectable 25 Gram(s) IV Push once  dextrose 50% Injectable 50 milliLiter(s) IV Push every 15 minutes  dextrose 50% Injectable 25 milliLiter(s) IV Push every 15 minutes  docusate sodium 100 milliGRAM(s) Oral three times a day  furosemide    Tablet 40 milliGRAM(s) Oral daily  glucagon  Injectable 1 milliGRAM(s) IntraMuscular once PRN  heparin  Infusion 600 Unit(s)/Hr IV Continuous <Continuous>  insulin lispro (HumaLOG) corrective regimen sliding scale   SubCutaneous Before meals and at bedtime  losartan 25 milliGRAM(s) Oral every 12 hours  oxyCODONE    5 mG/acetaminophen 325 mG 2 Tablet(s) Oral every 4 hours PRN  pantoprazole    Tablet 40 milliGRAM(s) Oral before breakfast  polyethylene glycol 3350 17 Gram(s) Oral daily  potassium chloride    Tablet ER 20 milliEquivalent(s) Oral daily  potassium chloride  10 mEq/50 mL IVPB 10 milliEquivalent(s) IV Intermittent every 1 hour  potassium chloride  10 mEq/50 mL IVPB 10 milliEquivalent(s) IV Intermittent every 1 hour  potassium chloride  10 mEq/50 mL IVPB 10 milliEquivalent(s) IV Intermittent every 1 hour  senna 2 Tablet(s) Oral at bedtime  sodium chloride 0.9%. 1000 milliLiter(s) IV Continuous <Continuous>    PMHX/PSHX:  HTN (hypertension)  Afib  No significant past surgical history    ROS:   General:  No fevers, chills  ENT:  No sore throat or dysphagia  CV:  No pain or palpitations  Resp:  No dyspnea, cough or  wheezing  GI:  No pain, No nausea, No vomiting,  No rectal bleeding, No tarry stools,  Skin:  No rash or edema    PHYSICAL EXAM:   Vital Signs:  Vital Signs Last 24 Hrs  T(C): 36.8 (2019 11:20), Max: 37.1 (2019 03:15)  T(F): 98.3 (2019 11:20), Max: 98.7 (2019 03:15)  HR: 78 (2019 11:20) (68 - 82)  BP: 144/68 (2019 11:20) (144/68 - 153/66)  BP(mean): 96 (2019 03:15) (92 - 96)  RR: 18 (2019 11:20) (16 - 20)  SpO2: 96% (2019 11:20) (94% - 96%)  Daily     Daily Weight in k.2 (2019 06:15)    GENERAL:  NAD, Appears stated age  HEENT:  NC/AT,  conjunctivae clear and pink, sclera -anicteric  CHEST:  Normal Effort, Breath sounds clear  HEART:  RRR, S1 + S2, no murmurs  ABDOMEN:  Soft, non-tender, non-distended, normoactive bowel sounds  EXTEREMITIES:  no cyanosis or edema  SKIN:  Warm & Dry.  NEURO:  Alert, oriented    LABS:                        9.7    11.0  )-----------( 120      ( 2019 06:06 )             28.1     Mean Cell Volume: 90.1 fl (19 @ 06:06)        141  |  105  |  9   ----------------------------<  105<H>  3.9   |  23  |  0.74    Ca    9.4      2019 06:06  Phos  2.4       Mg     2.1         TPro  6.5  /  Alb  4.2  /  TBili  1.4<H>  /  DBili  x   /  AST  44<H>  /  ALT  21  /  AlkPhos  57      LIVER FUNCTIONS - ( 2019 01:34 )  Alb: 4.2 g/dL / Pro: 6.5 g/dL / ALK PHOS: 57 U/L / ALT: 21 U/L / AST: 44 U/L / GGT: x           PT/INR - ( 2019 06:06 )   PT: 16.8 sec;   INR: 1.46 ratio       PTT - ( 2019 06:06 )  PTT:50.6 sec    Imaging:

## 2019-01-29 LAB
ANION GAP SERPL CALC-SCNC: 11 MMOL/L — SIGNIFICANT CHANGE UP (ref 5–17)
APTT BLD: 30.7 SEC — SIGNIFICANT CHANGE UP (ref 27.5–36.3)
APTT BLD: 38.6 SEC — HIGH (ref 27.5–36.3)
APTT BLD: 43.5 SEC — HIGH (ref 27.5–36.3)
BUN SERPL-MCNC: 10 MG/DL — SIGNIFICANT CHANGE UP (ref 7–23)
CALCIUM SERPL-MCNC: 9.1 MG/DL — SIGNIFICANT CHANGE UP (ref 8.4–10.5)
CHLORIDE SERPL-SCNC: 104 MMOL/L — SIGNIFICANT CHANGE UP (ref 96–108)
CO2 SERPL-SCNC: 24 MMOL/L — SIGNIFICANT CHANGE UP (ref 22–31)
CREAT SERPL-MCNC: 0.67 MG/DL — SIGNIFICANT CHANGE UP (ref 0.5–1.3)
GLUCOSE BLDC GLUCOMTR-MCNC: 112 MG/DL — HIGH (ref 70–99)
GLUCOSE BLDC GLUCOMTR-MCNC: 117 MG/DL — HIGH (ref 70–99)
GLUCOSE BLDC GLUCOMTR-MCNC: 118 MG/DL — HIGH (ref 70–99)
GLUCOSE BLDC GLUCOMTR-MCNC: 129 MG/DL — HIGH (ref 70–99)
GLUCOSE SERPL-MCNC: 103 MG/DL — HIGH (ref 70–99)
HCT VFR BLD CALC: 25.5 % — LOW (ref 34.5–45)
HGB BLD-MCNC: 9 G/DL — LOW (ref 11.5–15.5)
INR BLD: 1.57 RATIO — HIGH (ref 0.88–1.16)
MCHC RBC-ENTMCNC: 31.9 PG — SIGNIFICANT CHANGE UP (ref 27–34)
MCHC RBC-ENTMCNC: 35.2 GM/DL — SIGNIFICANT CHANGE UP (ref 32–36)
MCV RBC AUTO: 90.6 FL — SIGNIFICANT CHANGE UP (ref 80–100)
PLATELET # BLD AUTO: 152 K/UL — SIGNIFICANT CHANGE UP (ref 150–400)
POTASSIUM SERPL-MCNC: 3.6 MMOL/L — SIGNIFICANT CHANGE UP (ref 3.5–5.3)
POTASSIUM SERPL-SCNC: 3.6 MMOL/L — SIGNIFICANT CHANGE UP (ref 3.5–5.3)
PROTHROM AB SERPL-ACNC: 18.1 SEC — HIGH (ref 10–12.9)
RBC # BLD: 2.81 M/UL — LOW (ref 3.8–5.2)
RBC # FLD: 13.6 % — SIGNIFICANT CHANGE UP (ref 10.3–14.5)
SODIUM SERPL-SCNC: 139 MMOL/L — SIGNIFICANT CHANGE UP (ref 135–145)
WBC # BLD: 8.5 K/UL — SIGNIFICANT CHANGE UP (ref 3.8–10.5)
WBC # FLD AUTO: 8.5 K/UL — SIGNIFICANT CHANGE UP (ref 3.8–10.5)

## 2019-01-29 PROCEDURE — 71046 X-RAY EXAM CHEST 2 VIEWS: CPT | Mod: 26

## 2019-01-29 PROCEDURE — 99232 SBSQ HOSP IP/OBS MODERATE 35: CPT | Mod: GC

## 2019-01-29 PROCEDURE — 93010 ELECTROCARDIOGRAM REPORT: CPT

## 2019-01-29 RX ORDER — WARFARIN SODIUM 2.5 MG/1
3 TABLET ORAL ONCE
Qty: 0 | Refills: 0 | Status: COMPLETED | OUTPATIENT
Start: 2019-01-29 | End: 2019-01-29

## 2019-01-29 RX ORDER — METOPROLOL TARTRATE 50 MG
25 TABLET ORAL EVERY 12 HOURS
Qty: 0 | Refills: 0 | Status: DISCONTINUED | OUTPATIENT
Start: 2019-01-29 | End: 2019-01-30

## 2019-01-29 RX ORDER — METOPROLOL TARTRATE 50 MG
12.5 TABLET ORAL ONCE
Qty: 0 | Refills: 0 | Status: COMPLETED | OUTPATIENT
Start: 2019-01-29 | End: 2019-01-29

## 2019-01-29 RX ADMIN — WARFARIN SODIUM 3 MILLIGRAM(S): 2.5 TABLET ORAL at 22:45

## 2019-01-29 RX ADMIN — LOSARTAN POTASSIUM 25 MILLIGRAM(S): 100 TABLET, FILM COATED ORAL at 17:46

## 2019-01-29 RX ADMIN — HEPARIN SODIUM 8 UNIT(S)/HR: 5000 INJECTION INTRAVENOUS; SUBCUTANEOUS at 00:35

## 2019-01-29 RX ADMIN — Medication 25 MILLIGRAM(S): at 17:43

## 2019-01-29 RX ADMIN — HEPARIN SODIUM 9 UNIT(S)/HR: 5000 INJECTION INTRAVENOUS; SUBCUTANEOUS at 18:32

## 2019-01-29 RX ADMIN — Medication 12.5 MILLIGRAM(S): at 10:24

## 2019-01-29 RX ADMIN — ATORVASTATIN CALCIUM 40 MILLIGRAM(S): 80 TABLET, FILM COATED ORAL at 22:45

## 2019-01-29 RX ADMIN — Medication 81 MILLIGRAM(S): at 11:23

## 2019-01-29 RX ADMIN — Medication 20 MILLIEQUIVALENT(S): at 11:23

## 2019-01-29 RX ADMIN — Medication 40 MILLIGRAM(S): at 06:35

## 2019-01-29 RX ADMIN — PANTOPRAZOLE SODIUM 40 MILLIGRAM(S): 20 TABLET, DELAYED RELEASE ORAL at 06:35

## 2019-01-29 RX ADMIN — LOSARTAN POTASSIUM 25 MILLIGRAM(S): 100 TABLET, FILM COATED ORAL at 06:34

## 2019-01-29 RX ADMIN — Medication 100 MILLIGRAM(S): at 06:35

## 2019-01-29 RX ADMIN — Medication 12.5 MILLIGRAM(S): at 06:34

## 2019-01-29 RX ADMIN — HEPARIN SODIUM 9 UNIT(S)/HR: 5000 INJECTION INTRAVENOUS; SUBCUTANEOUS at 10:24

## 2019-01-29 RX ADMIN — HEPARIN SODIUM 8 UNIT(S)/HR: 5000 INJECTION INTRAVENOUS; SUBCUTANEOUS at 09:25

## 2019-01-29 RX ADMIN — OXYCODONE AND ACETAMINOPHEN 2 TABLET(S): 5; 325 TABLET ORAL at 23:16

## 2019-01-29 RX ADMIN — OXYCODONE AND ACETAMINOPHEN 2 TABLET(S): 5; 325 TABLET ORAL at 22:45

## 2019-01-29 NOTE — PROGRESS NOTE ADULT - SUBJECTIVE AND OBJECTIVE BOX
Chief Complaint:  Patient is a 53y old  Female who presents with a chief complaint of chest pain (2019 09:37)    Interval Events:   No acute overnight events. Patient denies any specific complaints.     Allergies:  No Known Allergies    Hospital Medications:  aspirin enteric coated 81 milliGRAM(s) Oral daily  atorvastatin 40 milliGRAM(s) Oral at bedtime  dextrose 40% Gel 15 Gram(s) Oral once PRN  dextrose 5%. 1000 milliLiter(s) IV Continuous <Continuous>  dextrose 50% Injectable 25 Gram(s) IV Push once  dextrose 50% Injectable 50 milliLiter(s) IV Push every 15 minutes  docusate sodium 100 milliGRAM(s) Oral three times a day  furosemide    Tablet 40 milliGRAM(s) Oral daily  heparin  Infusion 800 Unit(s)/Hr IV Continuous <Continuous>  insulin lispro (HumaLOG) corrective regimen sliding scale   SubCutaneous Before meals and at bedtime  losartan 25 milliGRAM(s) Oral every 12 hours  metoprolol tartrate 25 milliGRAM(s) Oral every 12 hours  metoprolol tartrate 12.5 milliGRAM(s) Oral once  oxyCODONE    5 mG/acetaminophen 325 mG 2 Tablet(s) Oral every 4 hours PRN  pantoprazole    Tablet 40 milliGRAM(s) Oral before breakfast  polyethylene glycol 3350 17 Gram(s) Oral daily  potassium chloride    Tablet ER 20 milliEquivalent(s) Oral daily  senna 2 Tablet(s) Oral at bedtime  sodium chloride 0.9%. 1000 milliLiter(s) IV Continuous <Continuous>    PMHX/PSHX:  HTN (hypertension)  Afib  No significant past surgical history    ROS:   General:  No fevers, chills  ENT:  No sore throat or dysphagia  CV:  No pain or palpitations  Resp:  No dyspnea, cough or  wheezing  GI:  No pain, No nausea, No vomiting, No diarrhea, No rectal bleeding, No tarry stools,  Skin:  No rash or edema    PHYSICAL EXAM:   Vital Signs:  Vital Signs Last 24 Hrs  T(C): 36.7 (2019 08:09), Max: 36.9 (2019 23:00)  T(F): 98 (2019 08:09), Max: 98.5 (2019 23:00)  HR: 82 (2019 08:09) (73 - 94)  BP: 153/70 (2019 08:09) (121/60 - 171/77)  BP(mean): 112 (2019 06:30) (81 - 121)  RR: 18 (2019 08:09) (18 - 18)  SpO2: 96% (2019 08:09) (94% - 100%)  Daily     Daily Weight in k.2 (2019 08:01)    GENERAL:  NAD, Appears stated age  HEENT:  NC/AT,  conjunctivae clear and pink, sclera -anicteric  CHEST:  Normal Effort, Breath sounds clear anteriorly  HEART:  RRR, S1 + S2, no murmurs  ABDOMEN:  Soft, non-tender, non-distended, BS+  EXTREMITIES  no cyanosis  SKIN:  Warm & Dry.  NEURO:  Alert, oriented    LABS:                        9.0    8.5   )-----------( 152      ( 2019 06:17 )             25.5     Mean Cell Volume: 90.6 fl (19 @ 06:17)        139  |  104  |  10  ----------------------------<  103<H>  3.6   |  24  |  0.67    Ca    9.1      2019 06:17    PT/INR - ( 2019 06:17 )   PT: 18.1 sec;   INR: 1.57 ratio       PTT - ( 2019 06:17 )  PTT:38.6 sec    Imaging:

## 2019-01-29 NOTE — PROGRESS NOTE ADULT - ATTENDING COMMENTS
Patient was seen and examined with Hepatology team at rounds. Agree with above.
Patient was seen and examined with Hepatology. Agree with above.
Case discussed with CT surgeon, Dr. Bellamy, prior to surgery today.  Will need to confirm with interventional cardiologist who performed RHC yesterday whether reported 11 mmHg was the transhepatic gradient or the wedged hepatic vein pressure.  If the former (transhepatic gradient of 11 mmHg), then that indicates that this patient has clinically significant sinusoidal (liver-related) portal hypertension presumably due to cardiac cirrhosis, which would put her at increased risk of post-operative complications including liver dysfunction, fluid overload, and bleeding.   If the latter (wedged HV pressure of 11 mmHg, indicating a normal transhepatic gradient), then her risk of complications will be lower, though we still would not be able to completely exclude the possibility (without liver biopsy) that she has early, well-compensated (Child-Castro class A) cardiac cirrhosis without sinusoidal portal hypertension.

## 2019-01-29 NOTE — PROGRESS NOTE ADULT - SUBJECTIVE AND OBJECTIVE BOX
Patient is a 53y old  Female who presents with a chief complaint of chest pain (29 Jan 2019 11:17)       SUBJECTIVE / OVERNIGHT EVENTS:  No chest pain. No shortness of breath. No complaints. No events overnight.     MEDICATIONS  (STANDING):  aspirin enteric coated 81 milliGRAM(s) Oral daily  atorvastatin 40 milliGRAM(s) Oral at bedtime  dextrose 5%. 1000 milliLiter(s) (50 mL/Hr) IV Continuous <Continuous>  dextrose 50% Injectable 25 Gram(s) IV Push once  dextrose 50% Injectable 50 milliLiter(s) IV Push every 15 minutes  docusate sodium 100 milliGRAM(s) Oral three times a day  furosemide    Tablet 40 milliGRAM(s) Oral daily  heparin  Infusion 800 Unit(s)/Hr (9 mL/Hr) IV Continuous <Continuous>  insulin lispro (HumaLOG) corrective regimen sliding scale   SubCutaneous Before meals and at bedtime  losartan 25 milliGRAM(s) Oral every 12 hours  metoprolol tartrate 25 milliGRAM(s) Oral every 12 hours  pantoprazole    Tablet 40 milliGRAM(s) Oral before breakfast  polyethylene glycol 3350 17 Gram(s) Oral daily  potassium chloride    Tablet ER 20 milliEquivalent(s) Oral daily  senna 2 Tablet(s) Oral at bedtime  sodium chloride 0.9%. 1000 milliLiter(s) (10 mL/Hr) IV Continuous <Continuous>    MEDICATIONS  (PRN):  dextrose 40% Gel 15 Gram(s) Oral once PRN Blood Glucose LESS THAN 70 milliGRAM(s)/deciliter  oxyCODONE    5 mG/acetaminophen 325 mG 2 Tablet(s) Oral every 4 hours PRN Moderate Pain (4 - 6)      Vital Signs Last 24 Hrs  T(C): 36.7 (29 Jan 2019 08:09), Max: 36.9 (28 Jan 2019 23:00)  T(F): 98 (29 Jan 2019 08:09), Max: 98.5 (28 Jan 2019 23:00)  HR: 82 (29 Jan 2019 08:09) (73 - 94)  BP: 153/70 (29 Jan 2019 08:09) (121/60 - 171/77)  BP(mean): 112 (29 Jan 2019 06:30) (81 - 121)  RR: 18 (29 Jan 2019 08:09) (18 - 18)  SpO2: 96% (29 Jan 2019 08:09) (94% - 100%)  CAPILLARY BLOOD GLUCOSE      POCT Blood Glucose.: 129 mg/dL (29 Jan 2019 11:52)  POCT Blood Glucose.: 117 mg/dL (29 Jan 2019 08:05)  POCT Blood Glucose.: 114 mg/dL (28 Jan 2019 21:47)  POCT Blood Glucose.: 83 mg/dL (28 Jan 2019 19:14)    I&O's Summary    28 Jan 2019 07:01  -  29 Jan 2019 07:00  --------------------------------------------------------  IN: 584 mL / OUT: 2776 mL / NET: -2192 mL    29 Jan 2019 07:01  -  29 Jan 2019 16:05  --------------------------------------------------------  IN: 240 mL / OUT: 350 mL / NET: -110 mL        PHYSICAL EXAM:  GENERAL: NAD, well-developed  HEAD:  Atraumatic, Normocephalic  EYES: EOMI, PERRLA, conjunctiva and sclera clear  NECK: Supple, No JVD  CHEST/LUNG: Clear to auscultation bilaterally; No wheeze  HEART: Regular rate and rhythm; No murmurs, rubs, or gallops  ABDOMEN: Soft, Nontender, Nondistended; Bowel sounds present  EXTREMITIES:  2+ Peripheral Pulses, No clubbing, cyanosis, or edema  PSYCH: AAOx3  NEUROLOGY: non-focal  SKIN: No rashes or lesions    LABS:                        9.0    8.5   )-----------( 152      ( 29 Jan 2019 06:17 )             25.5     01-29    139  |  104  |  10  ----------------------------<  103<H>  3.6   |  24  |  0.67    Ca    9.1      29 Jan 2019 06:17      PT/INR - ( 29 Jan 2019 06:17 )   PT: 18.1 sec;   INR: 1.57 ratio         PTT - ( 29 Jan 2019 06:17 )  PTT:38.6 sec          RADIOLOGY & ADDITIONAL TESTS:    Imaging Personally Reviewed:    Consultant(s) Notes Reviewed:      Care Discussed with Consultants/Other Providers:

## 2019-01-29 NOTE — PROGRESS NOTE ADULT - PROBLEM SELECTOR PLAN 3
Lopressor 12.5 BID up titrate as tolerated  Home med: coreg
EP following  No PPM at this time, continue to observe on telemetry
Lopressor 12.5 BID up titrate as tolerated  Home med: coreg
Lopressor 12.5 BID up titrate as tolerated  Home med: coreg

## 2019-01-29 NOTE — DIETITIAN INITIAL EVALUATION ADULT. - NS AS NUTRI INTERV ED CONTENT
Reviewed low sodium diet education with pt. Discussed low sodium recommendations, review of high sodium food items to limit/avoid, nutrition-label reading. Pt voiced understanding and accepted Low Sodium nutrition therapy handout for review at her leisure./Purpose of the nutrition education/Survival information/Nutrition relationship to health/disease

## 2019-01-29 NOTE — PROGRESS NOTE ADULT - PROBLEM SELECTOR PLAN 1
-s/p Micra PPM placement on 1/28/19 via R groin  -R groin suture previously removed this AM, DSD reinforced.  -CXR from 1/28 confirmed placement of PPM   -Afib, Vpaced 50-80s overnight, rate up to 110.   -PPM instructions reinforced with patient and daughter at bedside with teachback  -Monitor BMP, supplement as necessary  -Follow-up appt with Dr. Rodgers on February 5, 8:05am - Continue Telemetry monitoring  -s/p Micra PPM placement on 1/28/19 via R groin  -R groin suture previously removed this AM, DSD reinforced.  -CXR from 1/28 confirmed placement of PPM   -Afib, Vpaced 50-80s overnight, rate up to 110.   -PPM instructions reinforced with patient and daughter at bedside with teachback  -Monitor BMP, supplement as necessary  -Follow-up appt with Dr. Rodgers on February 5, 8:05am - Continue Telemetry monitoring  -s/p Micra PPM placement on 1/28/19 via R groin  -PPM interrogation done by [employer] rep with normal function  -R groin suture previously removed this AM, DSD reinforced.  -CXR from 1/28 confirmed placement of PPM   -PPM instructions reinforced with patient and daughter at bedside with teachback  -Monitor BMP, supplement as necessary  -Follow-up appt with [facility] on February 5, 8:05am

## 2019-01-29 NOTE — PROGRESS NOTE ADULT - ASSESSMENT
54 y/o [place] female with Afib, HTN presents with chest pain, found to have moderate MS, TR, now s/p MVR and TV repair and ASD closure, with noted pauses during OR time. EP c/s for possible need of PPM. Patient back to inherent Afib rhythm and is not pacemaker dependent. she has a reported hx of cirrhosis per primary team , and is on coreg at baseline.

## 2019-01-29 NOTE — PROGRESS NOTE ADULT - PROBLEM SELECTOR PLAN 2
+PW to EPM 50/15  EP recommendations noted   Not pacemaker dependent
Rate control with lopressor 12.5 daily  Continue AC on Coumadin and bridge with Heparin drip

## 2019-01-29 NOTE — PROGRESS NOTE ADULT - SUBJECTIVE AND OBJECTIVE BOX
24H hour events:     MEDICATIONS:  aspirin enteric coated 81 milliGRAM(s) Oral daily  furosemide    Tablet 40 milliGRAM(s) Oral daily  heparin  Infusion 800 Unit(s)/Hr IV Continuous <Continuous>  losartan 25 milliGRAM(s) Oral every 12 hours  metoprolol tartrate 25 milliGRAM(s) Oral every 12 hours        oxyCODONE    5 mG/acetaminophen 325 mG 2 Tablet(s) Oral every 4 hours PRN    docusate sodium 100 milliGRAM(s) Oral three times a day  pantoprazole    Tablet 40 milliGRAM(s) Oral before breakfast  polyethylene glycol 3350 17 Gram(s) Oral daily  senna 2 Tablet(s) Oral at bedtime    atorvastatin 40 milliGRAM(s) Oral at bedtime  dextrose 40% Gel 15 Gram(s) Oral once PRN  dextrose 50% Injectable 25 Gram(s) IV Push once  dextrose 50% Injectable 50 milliLiter(s) IV Push every 15 minutes  insulin lispro (HumaLOG) corrective regimen sliding scale   SubCutaneous Before meals and at bedtime    dextrose 5%. 1000 milliLiter(s) IV Continuous <Continuous>  potassium chloride    Tablet ER 20 milliEquivalent(s) Oral daily  sodium chloride 0.9%. 1000 milliLiter(s) IV Continuous <Continuous>      REVIEW OF SYSTEMS:  Complete 10point ROS negative.    PHYSICAL EXAM:  T(C): 36.7 (01-29-19 @ 08:09), Max: 36.9 (01-28-19 @ 23:00)  HR: 82 (01-29-19 @ 08:09) (73 - 94)  BP: 153/70 (01-29-19 @ 08:09) (121/60 - 171/77)  RR: 18 (01-29-19 @ 08:09) (18 - 18)  SpO2: 96% (01-29-19 @ 08:09) (94% - 100%)  Wt(kg): --  I&O's Summary    28 Jan 2019 07:01  -  29 Jan 2019 07:00  --------------------------------------------------------  IN: 584 mL / OUT: 2776 mL / NET: -2192 mL    29 Jan 2019 07:01 - 29 Jan 2019 11:18  --------------------------------------------------------  IN: 240 mL / OUT: 0 mL / NET: 240 mL        Appearance: Normal	  HEENT:   Normal oral mucosa, PERRL, EOMI	  Lymphatic: No lymphadenopathy  Cardiovascular: Normal S1 S2, No JVD, No murmurs, No edema  Respiratory: Lungs clear to auscultation	  Psychiatry: A & O x 3, Mood & affect appropriate  Gastrointestinal:  Soft, Non-tender, + BS	  Skin: No rashes, No ecchymoses, No cyanosis	  Neurologic: Non-focal  Extremities: Normal range of motion, No clubbing, cyanosis or edema  Vascular: Peripheral pulses palpable 2+ bilaterally        LABS:	 	    CBC Full  -  ( 29 Jan 2019 06:17 )  WBC Count : 8.5 K/uL  Hemoglobin : 9.0 g/dL  Hematocrit : 25.5 %  Platelet Count - Automated : 152 K/uL  Mean Cell Volume : 90.6 fl  Mean Cell Hemoglobin : 31.9 pg  Mean Cell Hemoglobin Concentration : 35.2 gm/dL  Auto Neutrophil # : x  Auto Lymphocyte # : x  Auto Monocyte # : x  Auto Eosinophil # : x  Auto Basophil # : x  Auto Neutrophil % : x  Auto Lymphocyte % : x  Auto Monocyte % : x  Auto Eosinophil % : x  Auto Basophil % : x    01-29    139  |  104  |  10  ----------------------------<  103<H>  3.6   |  24  |  0.67  01-28    141  |  105  |  9   ----------------------------<  105<H>  3.9   |  23  |  0.74    Ca    9.1      29 Jan 2019 06:17  Ca    9.4      28 Jan 2019 06:06        proBNP:   Lipid Profile:   HgA1c:   TSH:       CARDIAC MARKERS:          TELEMETRY: 	    ECG:  	  RADIOLOGY:  OTHER: 	    PREVIOUS DIAGNOSTIC TESTING:    [ ] Echocardiogram:    [ ]  Catheterization:  [ ] Stress Test:  	  	  ASSESSMENT/PLAN: 24H hour events:   No acute events overnight, alert and resting comfortably in the chair.     MEDICATIONS:  aspirin enteric coated 81 milliGRAM(s) Oral daily  furosemide    Tablet 40 milliGRAM(s) Oral daily  heparin  Infusion 800 Unit(s)/Hr IV Continuous <Continuous>  losartan 25 milliGRAM(s) Oral every 12 hours  metoprolol tartrate 25 milliGRAM(s) Oral every 12 hours  oxyCODONE    5 mG/acetaminophen 325 mG 2 Tablet(s) Oral every 4 hours PRN  docusate sodium 100 milliGRAM(s) Oral three times a day  pantoprazole    Tablet 40 milliGRAM(s) Oral before breakfast  polyethylene glycol 3350 17 Gram(s) Oral daily  senna 2 Tablet(s) Oral at bedtime    atorvastatin 40 milliGRAM(s) Oral at bedtime  dextrose 40% Gel 15 Gram(s) Oral once PRN  dextrose 50% Injectable 25 Gram(s) IV Push once  dextrose 50% Injectable 50 milliLiter(s) IV Push every 15 minutes  insulin lispro (HumaLOG) corrective regimen sliding scale   SubCutaneous Before meals and at bedtime    dextrose 5%. 1000 milliLiter(s) IV Continuous <Continuous>  potassium chloride    Tablet ER 20 milliEquivalent(s) Oral daily  sodium chloride 0.9%. 1000 milliLiter(s) IV Continuous <Continuous>      REVIEW OF SYSTEMS:  General: NAD, denies fever and chills   Cardiac: Denies chest pain, palpitations    Respiratory: Denies shortness of breath, wheezing, cough  GI: denies abdominal pain, N/V/D,   Peripheral Neurovascular: Denies numbness/tingling to all extremities   Neuro: denies HA, weakness,     PHYSICAL EXAM:  T(C): 36.7 (01-29-19 @ 08:09), Max: 36.9 (01-28-19 @ 23:00)  HR: 82 (01-29-19 @ 08:09) (73 - 94)  BP: 153/70 (01-29-19 @ 08:09) (121/60 - 171/77)  RR: 18 (01-29-19 @ 08:09) (18 - 18)  SpO2: 96% (01-29-19 @ 08:09) (94% - 100%)  Wt(kg): --  I&O's Summary    28 Jan 2019 07:01  -  29 Jan 2019 07:00  --------------------------------------------------------  IN: 584 mL / OUT: 2776 mL / NET: -2192 mL    29 Jan 2019 07:01  -  29 Jan 2019 11:18  --------------------------------------------------------  IN: 240 mL / OUT: 0 mL / NET: 240 mL    Appearance: Normal	  HEENT:   , PERRL, EOMI	  Cardiovascular: Afib, Vpaced on telemetry, 50-80s, up to 110   Respiratory: Lungs clear to auscultation	  Psychiatry: A & O x 3, Mood & affect appropriate  Gastrointestinal:  Soft, Non-tender, + BS	  Skin: R groin suture removed this AM. No ecchymoses or hematoma noted. Dressing clean, dry and intact  	  Neurologic: Non-focal  Extremities: Normal range of motion, No clubbing, cyanosis or edema  Vascular: Peripheral pulses palpable 2+ bilaterally        LABS:	 	    CBC Full  -  ( 29 Jan 2019 06:17 )  WBC Count : 8.5 K/uL  Hemoglobin : 9.0 g/dL  Hematocrit : 25.5 %  Platelet Count - Automated : 152 K/uL  Mean Cell Volume : 90.6 fl  Mean Cell Hemoglobin : 31.9 pg  Mean Cell Hemoglobin Concentration : 35.2 gm/dL  Auto Neutrophil # : x  Auto Lymphocyte # : x  Auto Monocyte # : x  Auto Eosinophil # : x  Auto Basophil # : x  Auto Neutrophil % : x  Auto Lymphocyte % : x  Auto Monocyte % : x  Auto Eosinophil % : x  Auto Basophil % : x    01-29    139  |  104  |  10  ----------------------------<  103<H>  3.6   |  24  |  0.67  01-28    141  |  105  |  9   ----------------------------<  105<H>  3.9   |  23  |  0.74    Ca    9.1      29 Jan 2019 06:17  Ca    9.4      28 Jan 2019 06:06        proBNP:   Lipid Profile:   HgA1c:   TSH:       CARDIAC MARKERS:          TELEMETRY: 	    ECG:  	  RADIOLOGY:  OTHER: 	    PREVIOUS DIAGNOSTIC TESTING:    [ ] Echocardiogram:    [ ]  Catheterization:  [ ] Stress Test:  	  	  ASSESSMENT/PLAN: 24H hour events:   No acute events overnight, alert and resting comfortably in the chair.     MEDICATIONS:  aspirin enteric coated 81 milliGRAM(s) Oral daily  furosemide    Tablet 40 milliGRAM(s) Oral daily  heparin  Infusion 800 Unit(s)/Hr IV Continuous <Continuous>  losartan 25 milliGRAM(s) Oral every 12 hours  metoprolol tartrate 25 milliGRAM(s) Oral every 12 hours  oxyCODONE    5 mG/acetaminophen 325 mG 2 Tablet(s) Oral every 4 hours PRN  docusate sodium 100 milliGRAM(s) Oral three times a day  pantoprazole    Tablet 40 milliGRAM(s) Oral before breakfast  polyethylene glycol 3350 17 Gram(s) Oral daily  senna 2 Tablet(s) Oral at bedtime    atorvastatin 40 milliGRAM(s) Oral at bedtime  dextrose 40% Gel 15 Gram(s) Oral once PRN  dextrose 50% Injectable 25 Gram(s) IV Push once  dextrose 50% Injectable 50 milliLiter(s) IV Push every 15 minutes  insulin lispro (HumaLOG) corrective regimen sliding scale   SubCutaneous Before meals and at bedtime    dextrose 5%. 1000 milliLiter(s) IV Continuous <Continuous>  potassium chloride    Tablet ER 20 milliEquivalent(s) Oral daily  sodium chloride 0.9%. 1000 milliLiter(s) IV Continuous <Continuous>      REVIEW OF SYSTEMS:  General: NAD, denies fever and chills   Cardiac: Denies chest pain, palpitations    Respiratory: Denies shortness of breath, wheezing, cough  GI: denies abdominal pain, N/V/D,   Peripheral Neurovascular: Denies numbness/tingling to all extremities   Neuro: denies HA, weakness,     PHYSICAL EXAM:  T(C): 36.7 (01-29-19 @ 08:09), Max: 36.9 (01-28-19 @ 23:00)  HR: 82 (01-29-19 @ 08:09) (73 - 94)  BP: 153/70 (01-29-19 @ 08:09) (121/60 - 171/77)  RR: 18 (01-29-19 @ 08:09) (18 - 18)  SpO2: 96% (01-29-19 @ 08:09) (94% - 100%)  Wt(kg): --  I&O's Summary    28 Jan 2019 07:01  -  29 Jan 2019 07:00  --------------------------------------------------------  IN: 584 mL / OUT: 2776 mL / NET: -2192 mL    29 Jan 2019 07:01  -  29 Jan 2019 11:18  --------------------------------------------------------  IN: 240 mL / OUT: 0 mL / NET: 240 mL    Appearance: Normal	  HEENT: normocephalic, atraumatic, normal oral mucosa   Cardiovascular: Afib, Vpaced on telemetry, 50-80s, up to 110   Respiratory: Lungs clear to auscultation	  Psychiatry: A & O x 3, Mood & affect appropriate  Gastrointestinal:  Soft, Non-tender, + BS	  Skin: R groin suture removed this AM. No ecchymoses or hematoma noted. Dressing clean, dry and intact  	  Neurologic: Non-focal  Extremities: Normal range of motion, No clubbing, cyanosis or edema  Vascular: Peripheral pulses palpable 2+ bilaterally        LABS:	 	    CBC Full  -  ( 29 Jan 2019 06:17 )  WBC Count : 8.5 K/uL  Hemoglobin : 9.0 g/dL  Hematocrit : 25.5 %  Platelet Count - Automated : 152 K/uL  Mean Cell Volume : 90.6 fl  Mean Cell Hemoglobin : 31.9 pg  Mean Cell Hemoglobin Concentration : 35.2 gm/dL  Auto Neutrophil # : x  Auto Lymphocyte # : x  Auto Monocyte # : x  Auto Eosinophil # : x  Auto Basophil # : x  Auto Neutrophil % : x  Auto Lymphocyte % : x  Auto Monocyte % : x  Auto Eosinophil % : x  Auto Basophil % : x    01-29    139  |  104  |  10  ----------------------------<  103<H>  3.6   |  24  |  0.67  01-28    141  |  105  |  9   ----------------------------<  105<H>  3.9   |  23  |  0.74    Ca    9.1      29 Jan 2019 06:17  Ca    9.4      28 Jan 2019 06:06        proBNP:   Lipid Profile:   HgA1c:   TSH:       CARDIAC MARKERS:          TELEMETRY: 	    ECG:  	  RADIOLOGY:  OTHER: 	    PREVIOUS DIAGNOSTIC TESTING:    [ ] Echocardiogram:    [ ]  Catheterization:  [ ] Stress Test:  	  	  ASSESSMENT/PLAN: 24H hour events:   No acute events overnight, alert and resting comfortably in the chair.     MEDICATIONS:  aspirin enteric coated 81 milliGRAM(s) Oral daily  furosemide    Tablet 40 milliGRAM(s) Oral daily  heparin  Infusion 800 Unit(s)/Hr IV Continuous <Continuous>  losartan 25 milliGRAM(s) Oral every 12 hours  metoprolol tartrate 25 milliGRAM(s) Oral every 12 hours  oxyCODONE    5 mG/acetaminophen 325 mG 2 Tablet(s) Oral every 4 hours PRN  docusate sodium 100 milliGRAM(s) Oral three times a day  pantoprazole    Tablet 40 milliGRAM(s) Oral before breakfast  polyethylene glycol 3350 17 Gram(s) Oral daily  senna 2 Tablet(s) Oral at bedtime    atorvastatin 40 milliGRAM(s) Oral at bedtime  dextrose 40% Gel 15 Gram(s) Oral once PRN  dextrose 50% Injectable 25 Gram(s) IV Push once  dextrose 50% Injectable 50 milliLiter(s) IV Push every 15 minutes  insulin lispro (HumaLOG) corrective regimen sliding scale   SubCutaneous Before meals and at bedtime    dextrose 5%. 1000 milliLiter(s) IV Continuous <Continuous>  potassium chloride    Tablet ER 20 milliEquivalent(s) Oral daily  sodium chloride 0.9%. 1000 milliLiter(s) IV Continuous <Continuous>      REVIEW OF SYSTEMS:  General: NAD, denies fever and chills   Cardiac: Denies chest pain, palpitations    Respiratory: Denies shortness of breath, wheezing, cough  GI: denies abdominal pain, N/V/D,   Peripheral Neurovascular: Denies numbness/tingling to all extremities   Neuro: denies HA, weakness,     PHYSICAL EXAM:  T(C): 36.7 (01-29-19 @ 08:09), Max: 36.9 (01-28-19 @ 23:00)  HR: 82 (01-29-19 @ 08:09) (73 - 94)  BP: 153/70 (01-29-19 @ 08:09) (121/60 - 171/77)  RR: 18 (01-29-19 @ 08:09) (18 - 18)  SpO2: 96% (01-29-19 @ 08:09) (94% - 100%)  Wt(kg): --  I&O's Summary    28 Jan 2019 07:01  -  29 Jan 2019 07:00  --------------------------------------------------------  IN: 584 mL / OUT: 2776 mL / NET: -2192 mL    29 Jan 2019 07:01  -  29 Jan 2019 11:18  --------------------------------------------------------  IN: 240 mL / OUT: 0 mL / NET: 240 mL    Appearance: Normal	  HEENT: normocephalic, atraumatic, normal oral mucosa   Cardiovascular: Afib, Vpaced on telemetry, 50-80s, up to 110   Respiratory: Lungs clear to auscultation	  Psychiatry: A & O x 3, Mood & affect appropriate  Gastrointestinal:  Soft, Non-tender, + BS	  Skin: R groin suture removed this AM. No ecchymoses or hematoma noted. Dressing clean, dry and intact  	  Neurologic: Non-focal  Extremities: Normal range of motion, No clubbing, cyanosis or edema  Vascular: Peripheral pulses palpable 2+ bilaterally        LABS:	 	    CBC Full  -  ( 29 Jan 2019 06:17 )  WBC Count : 8.5 K/uL  Hemoglobin : 9.0 g/dL  Hematocrit : 25.5 %  Platelet Count - Automated : 152 K/uL  Mean Cell Volume : 90.6 fl  Mean Cell Hemoglobin : 31.9 pg  Mean Cell Hemoglobin Concentration : 35.2 gm/dL  Auto Neutrophil # : x  Auto Lymphocyte # : x  Auto Monocyte # : x  Auto Eosinophil # : x  Auto Basophil # : x  Auto Neutrophil % : x  Auto Lymphocyte % : x  Auto Monocyte % : x  Auto Eosinophil % : x  Auto Basophil % : x    01-29    139  |  104  |  10  ----------------------------<  103<H>  3.6   |  24  |  0.67  01-28    141  |  105  |  9   ----------------------------<  105<H>  3.9   |  23  |  0.74    Ca    9.1      29 Jan 2019 06:17  Ca    9.4      28 Jan 2019 06:06      TELEMETRY: Afib occasionally paced 50-80s, up to 110      RADIOLOGY:  EXAM:  XR CHEST PORTABLE ROUTINE 1V                            PROCEDURE DATE:  01/28/2019      INTERPRETATION:  Clinical information: Status post cardiac surgery.    Single AP radiograph of the chest was obtained compared to previous study   of 1/27/2019.    Heart is enlarged in size. Patient is status post median sternotomy.   Surgical drain is noted in the mediastinum. Lungs are clear. Probable   small left pleural effusion is noted. Visualized osseous structures are   within normal limits.    Impression: Probable small left pleural effusion.    ARMIN SAVAGE M.D., ATTENDING RADIOLOGIST  This document has been electronically signed. Jan 28 2019  2:00PM

## 2019-01-29 NOTE — PROGRESS NOTE ADULT - PROBLEM SELECTOR PLAN 7
dvt: heparin/coumadin  GI: PPI  CAD: ASA Statin

## 2019-01-29 NOTE — DIETITIAN INITIAL EVALUATION ADULT. - ADHERENCE
fair/Pt states she cooks most of her food at home, does not add a lot of salt to meals. Admits to eating potato chips as a snack at times.

## 2019-01-29 NOTE — PROGRESS NOTE ADULT - ASSESSMENT
53F Lao hx afib not on AC, htn p/w chest pain. Patient reports midsternal burning chest pain started a few days ago, constant, gradually worsening worse with cough/deep breath and endorses slight shortness of breath. Radiates to R shoulder. Non exertional/non positional. Denies leg swelling, but notes that she returned on flight from [facility] yesterday (6hr flight). Also notes recent fevers/cough began approximately 1-2 weeks ago. No history of clots, no recent procedures/hospitalizations.    Sev MR  - s/p valve repair   - plan as per CT surgery    bradycardia  - s/p micra pacer    liver cirrhosis on CT  - liver sono done  - follow up with hepatology     Afib /  HTN  - cw metoprolol  - iv heparin    PULM nodule  - seen by pulm and CT surg  - PET scan as out pt  - pt to follow with pulmonary

## 2019-01-29 NOTE — PROGRESS NOTE ADULT - SUBJECTIVE AND OBJECTIVE BOX
im ok  VITAL SIGNS    Telemetry:  afib/v paced 80-90  Vital Signs Last 24 Hrs  T(C): 36.7 (19 @ 08:09), Max: 36.9 (19 @ 23:00)  T(F): 98 (19 @ 08:09), Max: 98.5 (19 @ 23:00)  HR: 82 (19 @ 08:09) (73 - 94)  BP: 153/70 (19 @ 08:09) (121/60 - 171/77)  RR: 18 (19 @ 08:09) (18 - 18)  SpO2: 96% (19 @ 08:09) (94% - 100%)                       9.0<L>                139  | 24   | 10           8.5   >-----------< 152     ------------------------< 103<H>                 25.5<L>                3.6  | 104  | 0.67                                                                      Ca 9.1   Mg x     Ph x        ,             9.7<L>                141  | 23   | 9            11.0<H> >-----------< 120<L>   ------------------------< 105<H>                 28.1<L>                3.9  | 105  | 0.74                                                                      Ca 9.4   Mg x     Ph x                  Daily     Daily Weight in k.2 (2019 08:01)        CAPILLARY BLOOD GLUCOSE      POCT Blood Glucose.: 117 mg/dL (2019 08:05)  POCT Blood Glucose.: 114 mg/dL (2019 21:47)  POCT Blood Glucose.: 83 mg/dL (2019 19:14)  POCT Blood Glucose.: 117 mg/dL (2019 11:45)                Coumadin    [x ] YES          [  ]      NO         afib mvr                          PHYSICAL EXAM        Neurology: alert and oriented x 3, nonfocal, no gross deficits  CV :  irreg  Sternal Wound :  CDI , Stable  Pacing Wires        [  ]   Settings:                                  Isolated  [ x ]  Lungs: bibasilar crackles   Drains:     MS         [  ] Drainage:                 L Pleural  [  ]  Drainage:                R Pleural  [  ]  Drainage:  Abdomen: soft, nontender, nondistended, positive bowel sounds, last bowel movement  +  :         voiding    Extremities:  trace edema - calf tenderness             aspirin enteric coated 81 milliGRAM(s) Oral daily  atorvastatin 40 milliGRAM(s) Oral at bedtime  dextrose 40% Gel 15 Gram(s) Oral once PRN  dextrose 5%. 1000 milliLiter(s) IV Continuous <Continuous>  dextrose 50% Injectable 25 Gram(s) IV Push once  dextrose 50% Injectable 50 milliLiter(s) IV Push every 15 minutes  docusate sodium 100 milliGRAM(s) Oral three times a day  furosemide    Tablet 40 milliGRAM(s) Oral daily  heparin  Infusion 800 Unit(s)/Hr IV Continuous <Continuous>  insulin lispro (HumaLOG) corrective regimen sliding scale   SubCutaneous Before meals and at bedtime  losartan 25 milliGRAM(s) Oral every 12 hours  metoprolol tartrate 25 milliGRAM(s) Oral every 12 hours  metoprolol tartrate 12.5 milliGRAM(s) Oral once  oxyCODONE    5 mG/acetaminophen 325 mG 2 Tablet(s) Oral every 4 hours PRN  pantoprazole    Tablet 40 milliGRAM(s) Oral before breakfast  polyethylene glycol 3350 17 Gram(s) Oral daily  potassium chloride    Tablet ER 20 milliEquivalent(s) Oral daily  senna 2 Tablet(s) Oral at bedtime  sodium chloride 0.9%. 1000 milliLiter(s) IV Continuous <Continuous>                    Physical Therapy Rec:   Home  [  ]   Home w/ PT  [  ]  Rehab  [  ]  Discussed with Cardiothoracic Team at AM rounds.

## 2019-01-29 NOTE — DIETITIAN INITIAL EVALUATION ADULT. - OTHER INFO
Pt seen for LOS admission. Pt seen for LOS admission. Pt currently reports good po intake/appetite at this time, states she is consuming most-all of meals, denies chewing/swallowing difficulty, denies GI distress no N+V, diarrhea or constipation. Last BM today per pt. Pt denies recent weight change, states -180 pounds consistent with current wt 173 pounds, pre-op wt 179 pounds. Pt reports adhering to a general healthful diet PTA, denies high intake of sodium PTA other than occasional snacking on potato chips. Amenable to further education on low sodium diet.

## 2019-01-29 NOTE — PROGRESS NOTE ADULT - ASSESSMENT
53 [place] female with Afib, HTN presents with chest pain, found to have moderate MS, TR, now      Hospital course:  1/25:  s/p MVR and TV repair and ASD closure complicated by pauses during OR time,  Extubated, weaned off pressors  1/26 EP consulted for possible need of PPM with noted pauses during OR time. Patient back to inherent Afib rhythm and is not pacemaker dependent. Continue to monitor.  1/27 transferred to floor, afib 60s, continue on Coumadin and Heparin drip, resumed on beta-blocker.  1/28 Low dose beta blocker for cad and rate control, pt with mata overnight, pacer not capturing. Low HR of 28 today.  Alternate v   wire attached, pacer at vvi 45, good capture at MA of 18.  D/w eps, PPM later today, pt npo  1/29 s/p micra ppm yesterday  coumadin  and heparin resumed.  Increase BB  May transfer to floor

## 2019-01-29 NOTE — PROGRESS NOTE ADULT - ASSESSMENT
Impression:  # Compensated Cirrhosis likely secondary to passive congestion from cardiac dysfunction - MELD-Na: 9 (on admission)  HE: AOx3, no history  Ascites: None on US 1/23/19  HCC: No focal lesions on US 1/23/19  Varices: Needs to be screened  # Afib (not on AC)  # Sinus Pauses s/p PPM (1/28/19)  # Pulmonary Nodule: Pulm recommending outpatient followup with PET and PFTs  # Hypertension    Recommendations:  - Outpatient EGD for variceal screening  - Please send NICOLE, AMA, ASMA, alpha 1 antitrypsin, ceruloplasmin, soluble liver antigen, anti LKM-1 Ab, immunoglobulin panel, iron panel (ferritin, iron, TIBC) for cirrhosis work up.  - Advance diet as tolerated  - Supportive care per primary team    Lidia Villareal MD  Gastroenterology Fellow  [phone]  Please page on call fellow on weekends and after 5pm on weekdays

## 2019-01-29 NOTE — DIETITIAN INITIAL EVALUATION ADULT. - ORAL INTAKE PTA
Pt reports good po intake PTA. Typical meal intake includes: Roti and egg for breakfast. Lunch and dinner: Roti and rice with vegetable, chicken, or fish lloyd, Pt does not eat beef or pork. Denies food allergies or intolerance. Denies EtOH use. Denies taking vitamin/mineral/herbal supplements PTA./good

## 2019-01-29 NOTE — DIETITIAN INITIAL EVALUATION ADULT. - NS AS NUTRI INTERV MEALS SNACK
Recommend continue current DASH/TLC diet order to promote heart health. Pt's food preferences obtained and honored on menu.

## 2019-01-29 NOTE — DIETITIAN INITIAL EVALUATION ADULT. - ENERGY NEEDS
ht: 62 inches. wt: 172.4 pounds (post-op, current, standing, +1 generalized edema noted). 179 pounds admit pre-op wt 1/20. current BMI: 31.5 kG/m2. UBW:  IBW: 110 pounds +/- 10%. %IBW: 157%.  Other pertinent objective information: 53 year old female pt with PMH Afib, HTN presents with chest pain, found to have moderate MS, TR, now s/p 1/25 MVR and TV repair and ASD closure . Also noted with cirrhosis, followed by hepatology. Surgical incision noted. ht: 62 inches. wt: 172.4 pounds (post-op, current, standing, +1 generalized edema noted). 179 pounds admit pre-op wt 1/20. current BMI: 31.5 kG/m2. UBW: 175-180 pounds per pt. IBW: 110 pounds +/- 10%. %IBW: 157%.  Other pertinent objective information: 53 year old female pt with PMH Afib, HTN presents with chest pain, found to have moderate MS, TR, now s/p 1/25 MVR and TV repair and ASD closure . Also noted with cirrhosis, followed by hepatology. Surgical incision noted.

## 2019-01-30 ENCOUNTER — TRANSCRIPTION ENCOUNTER (OUTPATIENT)
Age: 54
End: 2019-01-30

## 2019-01-30 VITALS
TEMPERATURE: 98 F | RESPIRATION RATE: 18 BRPM | DIASTOLIC BLOOD PRESSURE: 62 MMHG | HEART RATE: 95 BPM | OXYGEN SATURATION: 95 % | SYSTOLIC BLOOD PRESSURE: 116 MMHG

## 2019-01-30 LAB
ANION GAP SERPL CALC-SCNC: 13 MMOL/L — SIGNIFICANT CHANGE UP (ref 5–17)
APTT BLD: 60.1 SEC — HIGH (ref 27.5–36.3)
APTT BLD: 66.4 SEC — HIGH (ref 27.5–36.3)
BUN SERPL-MCNC: 9 MG/DL — SIGNIFICANT CHANGE UP (ref 7–23)
CALCIUM SERPL-MCNC: 9.5 MG/DL — SIGNIFICANT CHANGE UP (ref 8.4–10.5)
CHLORIDE SERPL-SCNC: 102 MMOL/L — SIGNIFICANT CHANGE UP (ref 96–108)
CO2 SERPL-SCNC: 26 MMOL/L — SIGNIFICANT CHANGE UP (ref 22–31)
CREAT SERPL-MCNC: 0.68 MG/DL — SIGNIFICANT CHANGE UP (ref 0.5–1.3)
GLUCOSE BLDC GLUCOMTR-MCNC: 103 MG/DL — HIGH (ref 70–99)
GLUCOSE BLDC GLUCOMTR-MCNC: 120 MG/DL — HIGH (ref 70–99)
GLUCOSE BLDC GLUCOMTR-MCNC: 136 MG/DL — HIGH (ref 70–99)
GLUCOSE SERPL-MCNC: 106 MG/DL — HIGH (ref 70–99)
HCT VFR BLD CALC: 27.9 % — LOW (ref 34.5–45)
HGB BLD-MCNC: 9.6 G/DL — LOW (ref 11.5–15.5)
INR BLD: 1.59 RATIO — HIGH (ref 0.88–1.16)
MCHC RBC-ENTMCNC: 31 PG — SIGNIFICANT CHANGE UP (ref 27–34)
MCHC RBC-ENTMCNC: 34.4 GM/DL — SIGNIFICANT CHANGE UP (ref 32–36)
MCV RBC AUTO: 90.3 FL — SIGNIFICANT CHANGE UP (ref 80–100)
PLATELET # BLD AUTO: 204 K/UL — SIGNIFICANT CHANGE UP (ref 150–400)
POTASSIUM SERPL-MCNC: 3.7 MMOL/L — SIGNIFICANT CHANGE UP (ref 3.5–5.3)
POTASSIUM SERPL-SCNC: 3.7 MMOL/L — SIGNIFICANT CHANGE UP (ref 3.5–5.3)
PROTHROM AB SERPL-ACNC: 18.4 SEC — HIGH (ref 10–12.9)
RBC # BLD: 3.09 M/UL — LOW (ref 3.8–5.2)
RBC # FLD: 13.6 % — SIGNIFICANT CHANGE UP (ref 10.3–14.5)
SODIUM SERPL-SCNC: 141 MMOL/L — SIGNIFICANT CHANGE UP (ref 135–145)
SURGICAL PATHOLOGY STUDY: SIGNIFICANT CHANGE UP
WBC # BLD: 7.6 K/UL — SIGNIFICANT CHANGE UP (ref 3.8–10.5)
WBC # FLD AUTO: 7.6 K/UL — SIGNIFICANT CHANGE UP (ref 3.8–10.5)

## 2019-01-30 PROCEDURE — 94002 VENT MGMT INPAT INIT DAY: CPT

## 2019-01-30 PROCEDURE — 82746 ASSAY OF FOLIC ACID SERUM: CPT

## 2019-01-30 PROCEDURE — 84132 ASSAY OF SERUM POTASSIUM: CPT

## 2019-01-30 PROCEDURE — 86891 AUTOLOGOUS BLOOD OP SALVAGE: CPT

## 2019-01-30 PROCEDURE — P9045: CPT

## 2019-01-30 PROCEDURE — 82607 VITAMIN B-12: CPT

## 2019-01-30 PROCEDURE — 81025 URINE PREGNANCY TEST: CPT

## 2019-01-30 PROCEDURE — 85610 PROTHROMBIN TIME: CPT

## 2019-01-30 PROCEDURE — C1769: CPT

## 2019-01-30 PROCEDURE — 93460 R&L HRT ART/VENTRICLE ANGIO: CPT

## 2019-01-30 PROCEDURE — 86901 BLOOD TYPING SEROLOGIC RH(D): CPT

## 2019-01-30 PROCEDURE — 86923 COMPATIBILITY TEST ELECTRIC: CPT

## 2019-01-30 PROCEDURE — 71045 X-RAY EXAM CHEST 1 VIEW: CPT

## 2019-01-30 PROCEDURE — 76937 US GUIDE VASCULAR ACCESS: CPT

## 2019-01-30 PROCEDURE — 99285 EMERGENCY DEPT VISIT HI MDM: CPT | Mod: 25

## 2019-01-30 PROCEDURE — 82565 ASSAY OF CREATININE: CPT

## 2019-01-30 PROCEDURE — 93880 EXTRACRANIAL BILAT STUDY: CPT

## 2019-01-30 PROCEDURE — 84100 ASSAY OF PHOSPHORUS: CPT

## 2019-01-30 PROCEDURE — 84443 ASSAY THYROID STIM HORMONE: CPT

## 2019-01-30 PROCEDURE — 85014 HEMATOCRIT: CPT

## 2019-01-30 PROCEDURE — 71046 X-RAY EXAM CHEST 2 VIEWS: CPT

## 2019-01-30 PROCEDURE — 33274 TCAT INSJ/RPL PERM LDLS PM: CPT

## 2019-01-30 PROCEDURE — 82962 GLUCOSE BLOOD TEST: CPT

## 2019-01-30 PROCEDURE — C1751: CPT

## 2019-01-30 PROCEDURE — 83735 ASSAY OF MAGNESIUM: CPT

## 2019-01-30 PROCEDURE — 85027 COMPLETE CBC AUTOMATED: CPT

## 2019-01-30 PROCEDURE — C1786: CPT

## 2019-01-30 PROCEDURE — 99152 MOD SED SAME PHYS/QHP 5/>YRS: CPT

## 2019-01-30 PROCEDURE — 71250 CT THORAX DX C-: CPT

## 2019-01-30 PROCEDURE — 84702 CHORIONIC GONADOTROPIN TEST: CPT

## 2019-01-30 PROCEDURE — 82553 CREATINE MB FRACTION: CPT

## 2019-01-30 PROCEDURE — 82803 BLOOD GASES ANY COMBINATION: CPT

## 2019-01-30 PROCEDURE — 88305 TISSUE EXAM BY PATHOLOGIST: CPT

## 2019-01-30 PROCEDURE — 97162 PT EVAL MOD COMPLEX 30 MIN: CPT

## 2019-01-30 PROCEDURE — P9037: CPT

## 2019-01-30 PROCEDURE — 93005 ELECTROCARDIOGRAM TRACING: CPT

## 2019-01-30 PROCEDURE — 84484 ASSAY OF TROPONIN QUANT: CPT

## 2019-01-30 PROCEDURE — 93451 RIGHT HEART CATH: CPT

## 2019-01-30 PROCEDURE — 86900 BLOOD TYPING SEROLOGIC ABO: CPT

## 2019-01-30 PROCEDURE — 94010 BREATHING CAPACITY TEST: CPT

## 2019-01-30 PROCEDURE — 81003 URINALYSIS AUTO W/O SCOPE: CPT

## 2019-01-30 PROCEDURE — 83880 ASSAY OF NATRIURETIC PEPTIDE: CPT

## 2019-01-30 PROCEDURE — 84295 ASSAY OF SERUM SODIUM: CPT

## 2019-01-30 PROCEDURE — 99239 HOSP IP/OBS DSCHRG MGMT >30: CPT | Mod: 24

## 2019-01-30 PROCEDURE — 82947 ASSAY GLUCOSE BLOOD QUANT: CPT

## 2019-01-30 PROCEDURE — 83605 ASSAY OF LACTIC ACID: CPT

## 2019-01-30 PROCEDURE — 82435 ASSAY OF BLOOD CHLORIDE: CPT

## 2019-01-30 PROCEDURE — P9012: CPT

## 2019-01-30 PROCEDURE — C1894: CPT

## 2019-01-30 PROCEDURE — 80053 COMPREHEN METABOLIC PANEL: CPT

## 2019-01-30 PROCEDURE — P9016: CPT

## 2019-01-30 PROCEDURE — 85379 FIBRIN DEGRADATION QUANT: CPT

## 2019-01-30 PROCEDURE — C1889: CPT

## 2019-01-30 PROCEDURE — 82330 ASSAY OF CALCIUM: CPT

## 2019-01-30 PROCEDURE — P9047: CPT

## 2019-01-30 PROCEDURE — C1887: CPT

## 2019-01-30 PROCEDURE — 86850 RBC ANTIBODY SCREEN: CPT

## 2019-01-30 PROCEDURE — 93306 TTE W/DOPPLER COMPLETE: CPT

## 2019-01-30 PROCEDURE — 97530 THERAPEUTIC ACTIVITIES: CPT

## 2019-01-30 PROCEDURE — P9059: CPT

## 2019-01-30 PROCEDURE — 82550 ASSAY OF CK (CPK): CPT

## 2019-01-30 PROCEDURE — 97116 GAIT TRAINING THERAPY: CPT

## 2019-01-30 PROCEDURE — 83036 HEMOGLOBIN GLYCOSYLATED A1C: CPT

## 2019-01-30 PROCEDURE — 80048 BASIC METABOLIC PNL TOTAL CA: CPT

## 2019-01-30 PROCEDURE — 96374 THER/PROPH/DIAG INJ IV PUSH: CPT

## 2019-01-30 PROCEDURE — 76700 US EXAM ABDOM COMPLETE: CPT

## 2019-01-30 PROCEDURE — 85730 THROMBOPLASTIN TIME PARTIAL: CPT

## 2019-01-30 PROCEDURE — C1729: CPT

## 2019-01-30 PROCEDURE — 85384 FIBRINOGEN ACTIVITY: CPT

## 2019-01-30 RX ORDER — LOSARTAN POTASSIUM 100 MG/1
1 TABLET, FILM COATED ORAL
Qty: 60 | Refills: 0 | OUTPATIENT
Start: 2019-01-30 | End: 2019-02-28

## 2019-01-30 RX ORDER — ASPIRIN/CALCIUM CARB/MAGNESIUM 324 MG
1 TABLET ORAL
Qty: 30 | Refills: 0 | OUTPATIENT
Start: 2019-01-30 | End: 2019-02-28

## 2019-01-30 RX ORDER — FUROSEMIDE 40 MG
1 TABLET ORAL
Qty: 0 | Refills: 0 | COMMUNITY

## 2019-01-30 RX ORDER — ISOSORBIDE MONONITRATE 60 MG/1
1 TABLET, EXTENDED RELEASE ORAL
Qty: 0 | Refills: 0 | COMMUNITY

## 2019-01-30 RX ORDER — CARVEDILOL PHOSPHATE 80 MG/1
1 CAPSULE, EXTENDED RELEASE ORAL
Qty: 0 | Refills: 0 | COMMUNITY

## 2019-01-30 RX ORDER — POTASSIUM CHLORIDE 20 MEQ
20 PACKET (EA) ORAL ONCE
Qty: 0 | Refills: 0 | Status: COMPLETED | OUTPATIENT
Start: 2019-01-30 | End: 2019-01-30

## 2019-01-30 RX ORDER — PANTOPRAZOLE SODIUM 20 MG/1
1 TABLET, DELAYED RELEASE ORAL
Qty: 30 | Refills: 0 | OUTPATIENT
Start: 2019-01-30 | End: 2019-02-28

## 2019-01-30 RX ORDER — SENNA PLUS 8.6 MG/1
2 TABLET ORAL
Qty: 60 | Refills: 0 | OUTPATIENT
Start: 2019-01-30 | End: 2019-02-28

## 2019-01-30 RX ORDER — DOCUSATE SODIUM 100 MG
1 CAPSULE ORAL
Qty: 90 | Refills: 0 | OUTPATIENT
Start: 2019-01-30 | End: 2019-02-28

## 2019-01-30 RX ORDER — WARFARIN SODIUM 2.5 MG/1
1 TABLET ORAL
Qty: 30 | Refills: 0 | OUTPATIENT
Start: 2019-01-30 | End: 2019-02-28

## 2019-01-30 RX ORDER — ATORVASTATIN CALCIUM 80 MG/1
1 TABLET, FILM COATED ORAL
Qty: 30 | Refills: 0 | OUTPATIENT
Start: 2019-01-30 | End: 2019-02-28

## 2019-01-30 RX ORDER — LOSARTAN POTASSIUM 100 MG/1
0.5 TABLET, FILM COATED ORAL
Qty: 0 | Refills: 0 | COMMUNITY

## 2019-01-30 RX ORDER — FUROSEMIDE 40 MG
1 TABLET ORAL
Qty: 30 | Refills: 0 | OUTPATIENT
Start: 2019-01-30 | End: 2019-02-28

## 2019-01-30 RX ORDER — METOPROLOL TARTRATE 50 MG
1 TABLET ORAL
Qty: 60 | Refills: 0 | OUTPATIENT
Start: 2019-01-30 | End: 2019-02-28

## 2019-01-30 RX ADMIN — HEPARIN SODIUM 9 UNIT(S)/HR: 5000 INJECTION INTRAVENOUS; SUBCUTANEOUS at 07:50

## 2019-01-30 RX ADMIN — PANTOPRAZOLE SODIUM 40 MILLIGRAM(S): 20 TABLET, DELAYED RELEASE ORAL at 06:01

## 2019-01-30 RX ADMIN — Medication 25 MILLIGRAM(S): at 06:01

## 2019-01-30 RX ADMIN — OXYCODONE AND ACETAMINOPHEN 2 TABLET(S): 5; 325 TABLET ORAL at 10:01

## 2019-01-30 RX ADMIN — OXYCODONE AND ACETAMINOPHEN 2 TABLET(S): 5; 325 TABLET ORAL at 10:30

## 2019-01-30 RX ADMIN — HEPARIN SODIUM 9 UNIT(S)/HR: 5000 INJECTION INTRAVENOUS; SUBCUTANEOUS at 01:19

## 2019-01-30 RX ADMIN — Medication 81 MILLIGRAM(S): at 11:45

## 2019-01-30 RX ADMIN — Medication 100 MILLIGRAM(S): at 06:01

## 2019-01-30 RX ADMIN — LOSARTAN POTASSIUM 25 MILLIGRAM(S): 100 TABLET, FILM COATED ORAL at 06:01

## 2019-01-30 RX ADMIN — Medication 20 MILLIEQUIVALENT(S): at 11:45

## 2019-01-30 RX ADMIN — Medication 40 MILLIGRAM(S): at 06:01

## 2019-01-30 RX ADMIN — Medication 20 MILLIEQUIVALENT(S): at 10:01

## 2019-01-30 NOTE — DISCHARGE NOTE ADULT - NS AS ACTIVITY OBS
Showering allowed/Stairs allowed/No Heavy lifting/straining/Do not make important decisions/Do not drive or operate machinery/Walking-Outdoors allowed/Walking-Indoors allowed

## 2019-01-30 NOTE — DISCHARGE NOTE ADULT - PATIENT PORTAL LINK FT
You can access the [facility] Patient Portal, offered by [facility], by registering with the following website: [email or web address]

## 2019-01-30 NOTE — DISCHARGE NOTE ADULT - MEDICATION SUMMARY - MEDICATIONS TO STOP TAKING
I will STOP taking the medications listed below when I get home from the hospital:    losartan 50 mg oral tablet  -- 0.5 tab(s) by mouth every 12 hours    isosorbide mononitrate 30 mg oral tablet, extended release  -- 1 tab(s) by mouth once a day (in the morning)    Coreg 3.125 mg oral tablet  -- 1 tab(s) by mouth once a day    NOTE: PATIENT HAS NOT STARTED MED YET    Lasix 40 mg oral tablet  -- 1 tab(s) by mouth once a day    NOTE: PATIENT HAS NOT STARTED MED YET    Ativan 2 mg oral tablet  -- 0.25 tab(s) by mouth once a day (at bedtime), As Needed

## 2019-01-30 NOTE — DISCHARGE NOTE ADULT - CARE PROVIDER_API CALL
Paco Bellamy)  Thoracic and Cardiac Surgery  [address] [place] [address]  Phone: [phone]  Fax: [phone]    Steve Ruffin)  Cardiac Electrophysiology; Cardiology; Internal Medicine  [address] [place] [id number]  Phone: [phone]

## 2019-01-30 NOTE — DISCHARGE NOTE ADULT - COMMUNITY RESOURCES
[facility] at [address] [place] [address], [address], [facility] [address]- [phone]. Appointment Friday February 1, 2019 10:30am with Dr. Sabrina Acosta for medical appointment and INR check. Please bring discharge summary to the appointment.     [facility] at [facility], first floor ([phone])

## 2019-01-30 NOTE — PROGRESS NOTE ADULT - ASSESSMENT
53F Tong[place] hx afib not on AC, htn p/w chest pain. Patient reports midsternal burning chest pain started a few days ago, constant, gradually worsening worse with cough/deep breath and endorses slight shortness of breath. Radiates to R shoulder. Non exertional/non positional. Denies leg swelling, but notes that she returned on flight from [facility] yesterday (6hr flight). Also notes recent fevers/cough began approximately 1-2 weeks ago. No history of clots, no recent procedures/hospitalizations.    Sev MR  - s/p valve repair   - plan as per CT surgery    bradycardia  - s/p micra pacer    liver cirrhosis on CT  - liver sono done  - follow up with hepatology     Afib /  HTN  - cw metoprolol  - iv heparin    PULM nodule  - seen by pulm and CT surg  - PET scan as out pt  - pt to follow with pulmonary    importance of follow up and medication compliance was stressed to patient and daughter

## 2019-01-30 NOTE — DISCHARGE NOTE ADULT - HOSPITAL COURSE
53 [place] female with Afib, HTN presents with chest pain, found to have moderate MS, and TR  Hospital course:  1/25 s/p MVR and TV repair and ASD closure complicated by pauses during OR time,  Post op Course:   Extubated, weaned off pressors  1/26 EP consulted for possible need of PPM with noted pauses during OR time. Patient back to inherent Afib rhythm and is not pacemaker dependent. Continue to monitor.  1/27 transferred to floor, afib 60s, continue on Coumadin and Heparin drip, resumed on beta-blocker.  1/28 Low dose beta blocker for cad and rate control, pt with [place] overnight, pacer not capturing. Low HR of 28 today.  Alternate v wire attached, pacer at vvi 45, good capture at MA of 18. D/W EPS, PPM later today, pt npo.   1/28 s/p micra ppm placement   1/29 Coumadin and heparin resumed.  Increase BB. May transfer to floor.   1/30 VVS; INR 1.59 à Discharge home today per Dr. Bellamy.  Patient to follow up at [facility] for Thoracic Surgery follow up for 1.6cm LLL pulmonary nodule and 5 mm nodule in RUL anteriorly and Hepatology for h/o cirrhosis.

## 2019-01-30 NOTE — DISCHARGE NOTE ADULT - CARE PLAN
Principal Discharge DX:	S/P MVR (mitral valve replacement)  Goal:	Recovery  Assessment and plan of treatment:	1. Daily Shower  2. Weight yourself daily and notify any weight gain greater than 2-3 pounds in 24 hours.  3. Regular diet - low fat, low cholesterol, no added salt.  4. Cleanse Midsternal incision daily while showering with warm water and mild soap, pat dry and maintain open to air.   5. Follow Cardiac Surgery Do's and Don'ts discharge instructions.   6. No driving until cleared by MD.   7. No heavy lifting nothing greater than 5 pounds until cleared by MD.   8. Call / Notify MD any fever greater than 101.0  9. Increase Activity as tolerated.  Secondary Diagnosis:	S/P TVR (tricuspid valve repair)  Goal:	Recovery  Assessment and plan of treatment:	1. Please have your PT/INR levels checked on Friday 2/1/19 at 10:30 am and then weekly at the [facility] with Dr. Bautista and follow up with Dr. Bautista for your coumadin dosage.    Clinic is located at [address] [place] [address]. [address] [place] [address].  [phone].  2. Notify MD and Dentist the need of antibiotic prophylaxis before any dental procedure to prevent infection of your new valve.  Secondary Diagnosis:	Afib  Goal:	Maintain a therapeutic INR 2-3  Assessment and plan of treatment:	1. Please have your PT/INR levels checked on Friday 2/1/19 at 10:30 am and then weekly at the [facility] with Dr. Bautista and follow up with Dr. Bautista for your coumadin dosage.  Clinic is located at [address] [place] [address]. [address]. [facility] [address].  [phone].  Secondary Diagnosis:	Pacemaker  Goal:	Recovery  Assessment and plan of treatment:	1. Follow PPM per booklet instructions provided   2. Follow-up appt with EP Clinic on Tuesday February 5th at 8:05 am for wound check.

## 2019-01-30 NOTE — DISCHARGE NOTE ADULT - ADDITIONAL INSTRUCTIONS
1. Follow up with Dr. Bellamy on February 2/8/19 10:00 am for your post op follow up appointment, please call the [facility] office to confirm your appointment at [phone].   2. Follow up at the [facility] with Dr. Bautista on Friday 2/1/19 at 10:30 am for your post op follow up visit and  PT/INR blood draws for coumadin dosage.   3. Follow-up appt with EP Clinic on Tuesday February 5th at 8:05 am for wound check.

## 2019-01-30 NOTE — DISCHARGE NOTE ADULT - MEDICATION SUMMARY - MEDICATIONS TO TAKE
I will START or STAY ON the medications listed below when I get home from the hospital:    PT / INR STAT   -- Please have your PT/ INR draw STAT on Friday 2/1/19 then weekly at [facility] with Dr. Bautista  -- Indication: For Coumadin dosing     Thoracic Surgery Referral for Pulmonary Nodules   -- Thoracic Surgery Referral for Pulmonary Nodules     DX R91.1  -- Indication: For Pulmonary Nodules     Hepatology Referral for h/o Cirrhosis of Liver   -- Hepatology Referral for h/o Cirrhosis of Liver     ICD code: K74.60  -- Indication: For Liver Cirrhosis     oxycodone-acetaminophen 5 mg-325 mg oral tablet  -- 2 tab(s) by mouth every 6 hours, As Needed -Moderate Pain (4 - 6) MDD:8 tabs  -- Indication: For As needed for pain     aspirin 81 mg oral delayed release tablet  -- 1 tab(s) by mouth once a day  -- Indication: For Blood thinner     losartan 25 mg oral tablet  -- 1 tab(s) by mouth every 12 hours  -- Indication: For Blood pressure control     atorvastatin 40 mg oral tablet  -- 1 tab(s) by mouth once a day (at bedtime)  -- Indication: For Cholesterol     metoprolol tartrate 25 mg oral tablet  -- 1 tab(s) by mouth every 12 hours  -- Indication: For Blood pressure and heart rate control     Lasix 20 mg oral tablet  -- 1 tab(s) by mouth once a day   -- Avoid prolonged or excessive exposure to direct and/or artificial sunlight while taking this medication.  It is very important that you take or use this exactly as directed.  Do not skip doses or discontinue unless directed by your doctor.  It may be advisable to drink a full glass orange juice or eat a banana daily while taking this medication.    -- Indication: For Diuretic     docusate sodium 100 mg oral capsule  -- 1 cap(s) by mouth 3 times a day, As Needed -for constipation   -- Indication: For Stool Softner as needed for Constipation     senna oral tablet  -- 2 tab(s) by mouth once a day (at bedtime), As Needed - for constipation   -- Indication: For Stool Laxative as needed for constipation     pantoprazole 40 mg oral delayed release tablet  -- 1 tab(s) by mouth once a day (before a meal)  -- Indication: For Acid Reflux I will START or STAY ON the medications listed below when I get home from the hospital:    PT / INR STAT   -- Please have your PT/ INR draw STAT on Friday 2/1/19 then weekly at [facility] with Dr. Bautista  -- Indication: For Coumadin dosing     Thoracic Surgery Referral for Pulmonary Nodules   -- Thoracic Surgery Referral for Pulmonary Nodules     DX R91.1  -- Indication: For Pulmonary Nodules     Hepatology Referral for h/o Cirrhosis of Liver   -- Hepatology Referral for h/o Cirrhosis of Liver     ICD code: K74.60  -- Indication: For Liver Cirrhosis     aspirin 81 mg oral delayed release tablet  -- 1 tab(s) by mouth once a day  -- Indication: For Blood thinner     oxycodone-acetaminophen 5 mg-325 mg oral tablet  -- 2 tab(s) by mouth every 6 hours, As Needed -Moderate Pain (4 - 6) MDD:8 tabs  -- Indication: For As needed for pain     losartan 25 mg oral tablet  -- 1 tab(s) by mouth every 12 hours  -- Indication: For Blood pressure control     Coumadin 5 mg oral tablet  -- 1 tab(s) by mouth once a day   -- Do not take this drug if you are pregnant.  It is very important that you take or use this exactly as directed.  Do not skip doses or discontinue unless directed by your doctor.  Obtain medical advice before taking any non-prescription drugs as some may affect the action of this medication.    -- Indication: For Blood thinner for Afib     atorvastatin 40 mg oral tablet  -- 1 tab(s) by mouth once a day (at bedtime)  -- Indication: For Cholesterol     metoprolol tartrate 25 mg oral tablet  -- 1 tab(s) by mouth every 12 hours  -- Indication: For Blood pressure and heart rate control     Lasix 20 mg oral tablet  -- 1 tab(s) by mouth once a day   -- Avoid prolonged or excessive exposure to direct and/or artificial sunlight while taking this medication.  It is very important that you take or use this exactly as directed.  Do not skip doses or discontinue unless directed by your doctor.  It may be advisable to drink a full glass orange juice or eat a banana daily while taking this medication.    -- Indication: For Diuretic     docusate sodium 100 mg oral capsule  -- 1 cap(s) by mouth 3 times a day, As Needed -for constipation   -- Indication: For Stool Softner as needed for Constipation     senna oral tablet  -- 2 tab(s) by mouth once a day (at bedtime), As Needed - for constipation   -- Indication: For Stool Laxative as needed for constipation     pantoprazole 40 mg oral delayed release tablet  -- 1 tab(s) by mouth once a day (before a meal)  -- Indication: For Acid Reflux

## 2019-01-30 NOTE — DISCHARGE NOTE ADULT - CARE PROVIDERS DIRECT ADDRESSES
[email or web address]

## 2019-01-30 NOTE — PROGRESS NOTE ADULT - PROVIDER SPECIALTY LIST ADULT
CT Surgery
Cardiology
Critical Care
Critical Care
Electrophysiology
Electrophysiology
Hepatology
Internal Medicine
Cardiology
CT Surgery
CT Surgery

## 2019-01-30 NOTE — DISCHARGE NOTE ADULT - PLAN OF CARE
Recovery 1. Daily Shower  2. Weight yourself daily and notify any weight gain greater than 2-3 pounds in 24 hours.  3. Regular diet - low fat, low cholesterol, no added salt.  4. Cleanse Midsternal incision daily while showering with warm water and mild soap, pat dry and maintain open to air.   5. Follow Cardiac Surgery Do's and Don'ts discharge instructions.   6. No driving until cleared by MD.   7. No heavy lifting nothing greater than 5 pounds until cleared by MD.   8. Call / Notify MD any fever greater than 101.0  9. Increase Activity as tolerated. 1. Please have your PT/INR levels checked on Friday 2/1/19 at 10:30 am and then weekly at the [facility] with Dr. Bautista and follow up with Dr. Bautista for your coumadin dosage.    Clinic is located at [address] [place] [address]. [address]. [place] [address].  [phone].  2. Notify MD and Dentist the need of antibiotic prophylaxis before any dental procedure to prevent infection of your new valve. Maintain a therapeutic INR 2-3 1. Please have your PT/INR levels checked on Friday 2/1/19 at 10:30 am and then weekly at the [facility] with Dr. Bautista and follow up with Dr. Bautista for your coumadin dosage.  Clinic is located at [address] [place] [address]. [address]. [facility] [address].  [phone]. 1. Follow PPM per booklet instructions provided   2. Follow-up appt with EP Clinic on Tuesday February 5th at 8:05 am for wound check.

## 2019-01-30 NOTE — PROGRESS NOTE ADULT - SUBJECTIVE AND OBJECTIVE BOX
Patient is a 53y old  Female who presents with a chief complaint of chest pain (30 Jan 2019 12:01)      SUBJECTIVE / OVERNIGHT EVENTS:  No chest pain. No shortness of breath. No complaints. No events overnight. seen and examined daughter at bedside    MEDICATIONS  (STANDING):  aspirin enteric coated 81 milliGRAM(s) Oral daily  atorvastatin 40 milliGRAM(s) Oral at bedtime  dextrose 5%. 1000 milliLiter(s) (50 mL/Hr) IV Continuous <Continuous>  dextrose 50% Injectable 25 Gram(s) IV Push once  dextrose 50% Injectable 50 milliLiter(s) IV Push every 15 minutes  docusate sodium 100 milliGRAM(s) Oral three times a day  furosemide    Tablet 40 milliGRAM(s) Oral daily  insulin lispro (HumaLOG) corrective regimen sliding scale   SubCutaneous Before meals and at bedtime  losartan 25 milliGRAM(s) Oral every 12 hours  metoprolol tartrate 25 milliGRAM(s) Oral every 12 hours  pantoprazole    Tablet 40 milliGRAM(s) Oral before breakfast  polyethylene glycol 3350 17 Gram(s) Oral daily  potassium chloride    Tablet ER 20 milliEquivalent(s) Oral daily  senna 2 Tablet(s) Oral at bedtime  sodium chloride 0.9%. 1000 milliLiter(s) (10 mL/Hr) IV Continuous <Continuous>    MEDICATIONS  (PRN):  dextrose 40% Gel 15 Gram(s) Oral once PRN Blood Glucose LESS THAN 70 milliGRAM(s)/deciliter  oxyCODONE    5 mG/acetaminophen 325 mG 2 Tablet(s) Oral every 4 hours PRN Moderate Pain (4 - 6)      Vital Signs Last 24 Hrs  T(C): 36.9 (30 Jan 2019 14:00), Max: 37.1 (29 Jan 2019 20:10)  T(F): 98.5 (30 Jan 2019 14:00), Max: 98.8 (29 Jan 2019 20:10)  HR: 95 (30 Jan 2019 14:00) (66 - 95)  BP: 116/62 (30 Jan 2019 14:00) (116/62 - 133/76)  BP(mean): --  RR: 18 (30 Jan 2019 14:00) (18 - 18)  SpO2: 95% (30 Jan 2019 14:00) (95% - 95%)  CAPILLARY BLOOD GLUCOSE      POCT Blood Glucose.: 136 mg/dL (30 Jan 2019 11:44)  POCT Blood Glucose.: 120 mg/dL (30 Jan 2019 08:03)  POCT Blood Glucose.: 112 mg/dL (29 Jan 2019 22:02)  POCT Blood Glucose.: 118 mg/dL (29 Jan 2019 16:22)    I&O's Summary    29 Jan 2019 07:01  -  30 Jan 2019 07:00  --------------------------------------------------------  IN: 600 mL / OUT: 950 mL / NET: -350 mL    30 Jan 2019 07:01  -  30 Jan 2019 14:54  --------------------------------------------------------  IN: 240 mL / OUT: 0 mL / NET: 240 mL        PHYSICAL EXAM:  GENERAL: NAD, well-developed  HEAD:  Atraumatic, Normocephalic  EYES: EOMI, PERRLA, conjunctiva and sclera clear  NECK: Supple, No JVD  CHEST/LUNG: Clear to auscultation bilaterally; No wheeze  HEART: Regular rate and rhythm; No murmurs, rubs, or gallops  ABDOMEN: Soft, Nontender, Nondistended; Bowel sounds present  EXTREMITIES:  2+ Peripheral Pulses, No clubbing, cyanosis, or edema  PSYCH: AAOx3  NEUROLOGY: non-focal  SKIN: No rashes or lesions    LABS:                        9.6    7.6   )-----------( 204      ( 30 Jan 2019 07:08 )             27.9     01-30    141  |  102  |  9   ----------------------------<  106<H>  3.7   |  26  |  0.68    Ca    9.5      30 Jan 2019 07:08      PT/INR - ( 30 Jan 2019 07:08 )   PT: 18.4 sec;   INR: 1.59 ratio         PTT - ( 30 Jan 2019 07:08 )  PTT:66.4 sec          RADIOLOGY & ADDITIONAL TESTS:    Imaging Personally Reviewed:    Consultant(s) Notes Reviewed:      Care Discussed with Consultants/Other Providers:

## 2019-01-30 NOTE — DISCHARGE NOTE ADULT - OTHER SIGNIFICANT FINDINGS
VITAL SIGNS    Subjective: "I'm feeling ok" Denies CP, palpitation, SOB, DOCKERY, HA, dizziness, N/V/D, fever or chills.  No acute event noted overnight.     Telemetry: Afib / Vpaced      Vital Signs Last 24 Hrs  T(C): 36.5 (19 @ 04:59), Max: 37.1 (19 @ 20:10)  T(F): 97.7 (19 @ 04:59), Max: 98.8 (19 @ 20:10)  HR: 66 (19 @ 04:59) (66 - 77)  BP: 133/76 (19 @ 04:59) (120/75 - 133/76)  RR: 18 (19 @ 04:59) (18 - 18)  SpO2: 95% (19 @ 04:59) (95% - 95%)            07: @ 07:00  --------------------------------------------------------  IN: 600 mL / OUT: 950 mL / NET: -350 mL     @ 07: @ 13:24  --------------------------------------------------------  IN: 240 mL / OUT: 0 mL / NET: 240 mL      Daily     Daily Weight in k.4 (2019 09:53)    CAPILLARY BLOOD GLUCOSE    POCT Blood Glucose.: 136 mg/dL (2019 11:44)  POCT Blood Glucose.: 120 mg/dL (2019 08:03)  POCT Blood Glucose.: 112 mg/dL (2019 22:02)  POCT Blood Glucose.: 118 mg/dL (2019 16:22)             PHYSICAL EXAM    Neurology: alert and oriented x 3, nonfocal, no gross deficits    CV: (+) S1 and S2, No murmurs, rubs, gallops or clicks     Sternal Wound:  MSI -->CDI , sternum stable    Lungs: CTA B/L     Abdomen: soft, nontender, nondistended, positive bowel sounds, (+) Flatus; (+) BM     :  Voiding               Extremities:  B/L LE (+) edema; negative calf tenderness; (+) 2 DP palpable        aspirin enteric coated 81 milliGRAM(s) Oral daily  atorvastatin 40 milliGRAM(s) Oral at bedtime  dextrose 40% Gel 15 Gram(s) Oral once PRN  dextrose 5%. 1000 milliLiter(s) IV Continuous <Continuous>  dextrose 50% Injectable 25 Gram(s) IV Push once  dextrose 50% Injectable 50 milliLiter(s) IV Push every 15 minutes  docusate sodium 100 milliGRAM(s) Oral three times a day  furosemide Tablet 40 milliGRAM(s) Oral daily  insulin lispro (HumaLOG) corrective regimen sliding scale   SubCutaneous Before meals and at bedtime  losartan 25 milliGRAM(s) Oral every 12 hours  metoprolol tartrate 25 milliGRAM(s) Oral every 12 hours  oxyCODONE 5 mG/acetaminophen 325 mG 2 Tablet(s) Oral every 4 hours PRN  pantoprazole Tablet 40 milliGRAM(s) Oral before breakfast  polyethylene glycol 3350 17 Gram(s) Oral daily  potassium chloride Tablet ER 20 milliEquivalent(s) Oral daily  senna 2 Tablet(s) Oral at bedtime  sodium chloride 0.9%. 1000 milliLiter(s) IV Continuous <Continuous> VITAL SIGNS    Subjective: "I'm feeling ok" Denies CP, palpitation, SOB, DOCKERY, HA, dizziness, N/V/D, fever or chills.  No acute event noted overnight.     Telemetry: Afib / Vpaced      Vital Signs Last 24 Hrs  T(C): 36.5 (19 @ 04:59), Max: 37.1 (19 @ 20:10)  T(F): 97.7 (19 @ 04:59), Max: 98.8 (19 @ 20:10)  HR: 66 (19 @ 04:59) (66 - 77)  BP: 133/76 (19 @ 04:59) (120/75 - 133/76)  RR: 18 (19 @ 04:59) (18 - 18)  SpO2: 95% (19 @ 04:59) (95% - 95%)            07: @ 07:00  --------------------------------------------------------  IN: 600 mL / OUT: 950 mL / NET: -350 mL     @ 07: @ 13:24  --------------------------------------------------------  IN: 240 mL / OUT: 0 mL / NET: 240 mL      Daily     Daily Weight in k.4 (2019 09:53)    CAPILLARY BLOOD GLUCOSE    POCT Blood Glucose.: 136 mg/dL (2019 11:44)  POCT Blood Glucose.: 120 mg/dL (2019 08:03)  POCT Blood Glucose.: 112 mg/dL (2019 22:02)  POCT Blood Glucose.: 118 mg/dL (2019 16:22)             PHYSICAL EXAM    Neurology: alert and oriented x 3, nonfocal, no gross deficits    CV: (+) S1 and S2, No murmurs, rubs, gallops or clicks     Sternal Wound:  MSI -->CDI , sternum stable    Lungs: CTA B/L     Abdomen: soft, nontender, nondistended, positive bowel sounds, (+) Flatus; (+) BM     :  Voiding               Extremities:  B/L LE (+) edema; negative calf tenderness; (+) 2 DP palpable        aspirin enteric coated 81 milliGRAM(s) Oral daily  atorvastatin 40 milliGRAM(s) Oral at bedtime  dextrose 40% Gel 15 Gram(s) Oral once PRN  dextrose 5%. 1000 milliLiter(s) IV Continuous <Continuous>  dextrose 50% Injectable 25 Gram(s) IV Push once  dextrose 50% Injectable 50 milliLiter(s) IV Push every 15 minutes  docusate sodium 100 milliGRAM(s) Oral three times a day  furosemide Tablet 40 milliGRAM(s) Oral daily  insulin lispro (HumaLOG) corrective regimen sliding scale   SubCutaneous Before meals and at bedtime  losartan 25 milliGRAM(s) Oral every 12 hours  metoprolol tartrate 25 milliGRAM(s) Oral every 12 hours  oxyCODONE 5 mG/acetaminophen 325 mG 2 Tablet(s) Oral every 4 hours PRN  pantoprazole Tablet 40 milliGRAM(s) Oral before breakfast  polyethylene glycol 3350 17 Gram(s) Oral daily  potassium chloride Tablet ER 20 milliEquivalent(s) Oral daily  senna 2 Tablet(s) Oral at bedtime  sodium chloride 0.9%. 1000 milliLiter(s) IV Continuous <Continuous>    Spent more than 30 min with patient.

## 2019-01-30 NOTE — PROGRESS NOTE ADULT - REASON FOR ADMISSION
chest pain
MVR / Tricuspid Repair / Cardiac Surgery
chest pain
1/25/19 MVR (T) TV Repair ASD Closure NASIR Ligation
chest pain

## 2019-02-01 ENCOUNTER — OUTPATIENT (OUTPATIENT)
Dept: OUTPATIENT SERVICES | Facility: HOSPITAL | Age: 54
LOS: 1 days | End: 2019-02-01
Payer: SELF-PAY

## 2019-02-01 ENCOUNTER — APPOINTMENT (OUTPATIENT)
Dept: INTERNAL MEDICINE | Facility: CLINIC | Age: 54
End: 2019-02-01
Payer: MEDICAID

## 2019-02-01 VITALS
BODY MASS INDEX: 29.02 KG/M2 | DIASTOLIC BLOOD PRESSURE: 70 MMHG | HEIGHT: 64 IN | SYSTOLIC BLOOD PRESSURE: 120 MMHG | WEIGHT: 170 LBS

## 2019-02-01 DIAGNOSIS — I10 ESSENTIAL (PRIMARY) HYPERTENSION: ICD-10-CM

## 2019-02-01 LAB
INR PPP: 1.9 RATIO
POCT-PROTHROMBIN TIME: 23.3 SECS
QUALITY CONTROL: YES

## 2019-02-01 PROCEDURE — G0463: CPT

## 2019-02-01 PROCEDURE — 99213 OFFICE O/P EST LOW 20 MIN: CPT | Mod: GE

## 2019-02-01 NOTE — PHYSICAL EXAM
[No Acute Distress] : no acute distress [Well-Appearing] : well-appearing [Normal Sclera/Conjunctiva] : normal sclera/conjunctiva [No Respiratory Distress] : no respiratory distress  [Clear to Auscultation] : lungs were clear to auscultation bilaterally [No Accessory Muscle Use] : no accessory muscle use [Normal Rate] : normal rate  [Regular Rhythm] : with a regular rhythm [Normal S1, S2] : normal S1 and S2 [No Murmur] : no murmur heard [No Edema] : there was no peripheral edema [Soft] : abdomen soft [Non Tender] : non-tender [Non-distended] : non-distended [Normal Bowel Sounds] : normal bowel sounds [No Focal Deficits] : no focal deficits [Normal Affect] : the affect was normal [Alert and Oriented x3] : oriented to person, place, and time [Normal Mood] : the mood was normal

## 2019-02-04 NOTE — ASSESSMENT
[FreeTextEntry1] : 53F with HTN, A. fib on coumadin with recent hospital admission for chest pain, s/p mitral valve replacement and tricuspid valve repair, with PPM placed 1/28/19, here for post-discharge followup and for INR check.\par \par Patient's INR 1.9 during office visit today.\par Instructed patient (and daughter who accompanied her) that patient should take 7.5 mg coumadin for today only, and resume 5 mg coumadin starting tomorrow. Goal INR 2-3.\par \par Since patient is visiting from [facility] I gave her the phone number and address of the free medicine clinic in [facility] and she can get her subsequent INR's, blood draws (patient with post-operative acute anemia so needs a followup CBC at a subsequent visit), and referrals done from that clinic.\par \par Patient was given referral while inpatient to go to hepatology for finding of cirrhosis on imaging, as well as 1.6cm LLL pulmonary nodule and 5 mm nodule in RUL for which she should see thoracic surgery. Did not send those referrals within our system as patient visiting from [facility] and would receive a large bill- encouraged patient to follow up with free [facility] in [place] and follow up with those referrals.\par \par Discussed with Dr. Siddiqi.

## 2019-02-04 NOTE — HISTORY OF PRESENT ILLNESS
[FreeTextEntry8] : 53 year old female visiting from [facility], with A. fib (newly on coumadin), HTN, with recent MVR and TVR, with PPM placement on 1/28/19 due to bradycardia, presents for post hospital discharge followup and for POCT INR check. Patient initially came from [facility] 2-3 weeks ago for a family event however due to her acute medical issues she will stay in [place] for at least several weeks-months longer. \par \par Hospital course:\par 1/25 s/p MVR and TV repair and ASD closure complicated by pauses during OR time,\par Post op Course: \par Extubated, weaned off pressors\par 1/26 EP consulted for possible need of PPM with noted pauses during OR time. Patient back to inherent Afib rhythm and is not pacemaker dependent. Continue to monitor.\par 1/27 transferred to floor, afib 60s, continue on Coumadin and Heparin drip, resumed on beta-blocker.\par 1/28 Low dose beta blocker for cad and rate control, pt with [facility] overnight, pacer not capturing. Low HR of 28 today.  Alternate v wire attached, pacer at vvi 45, good capture at MA of 18. D/W EPS, PPM later today, pt npo. \par 1/28 s/p micra ppm placement \par 1/29 Coumadin and heparin resumed.  Increase BB. May transfer to floor. \par 1/30 VVS; INR 1.59 à Discharge home today per Dr. Bellamy.  Patient to follow up at [facility] for Thoracic Surgery follow up for 1.6cm LLL pulmonary nodule and 5 mm nodule in RUL anteriorly and Hepatology for h/o cirrhosis.  \par \par At today's visit patient states she feels better on her new medications, but states occasional positional chest pain due to her chest incisions from surgery in past week. No dyspnea or DOCKERY. Has been ambulating without issues. No lower extremity edema. initially had constipation while in hospital, but was discharged with stool softeners and has responded appropriately . \par

## 2019-02-04 NOTE — REVIEW OF SYSTEMS
[Fever] : no fever [Chills] : no chills [Lower Ext Edema] : no lower extremity edema [Orthopnea] : no orthopnea [Shortness Of Breath] : no shortness of breath [Wheezing] : no wheezing [Cough] : no cough [Dyspnea on Exertion] : no dyspnea on exertion [Abdominal Pain] : no abdominal pain [Nausea] : no nausea [Constipation] : no constipation [Vomiting] : no vomiting [Dysuria] : no dysuria [Hematuria] : no hematuria [Itching] : no itching [Headache] : no headache [Dizziness] : no dizziness [Fainting] : no fainting [Easy Bleeding] : no easy bleeding [FreeTextEntry5] : chest incision positional pain

## 2019-02-05 ENCOUNTER — NON-APPOINTMENT (OUTPATIENT)
Age: 54
End: 2019-02-05

## 2019-02-05 ENCOUNTER — APPOINTMENT (OUTPATIENT)
Dept: ELECTROPHYSIOLOGY | Facility: CLINIC | Age: 54
End: 2019-02-05
Payer: MEDICAID

## 2019-02-05 VITALS
DIASTOLIC BLOOD PRESSURE: 83 MMHG | SYSTOLIC BLOOD PRESSURE: 128 MMHG | HEIGHT: 64 IN | WEIGHT: 168 LBS | BODY MASS INDEX: 28.68 KG/M2 | HEART RATE: 74 BPM | OXYGEN SATURATION: 98 %

## 2019-02-05 PROCEDURE — 93279 PRGRMG DEV EVAL PM/LDLS PM: CPT

## 2019-02-05 PROCEDURE — 99024 POSTOP FOLLOW-UP VISIT: CPT

## 2019-02-07 RX ORDER — OXYCODONE HYDROCHLORIDE AND ACETAMINOPHEN 5; 325 MG/1; MG/1
5-325 TABLET ORAL
Qty: 30 | Refills: 0 | Status: ACTIVE | COMMUNITY

## 2019-02-07 RX ORDER — SENNOSIDES 8.6 MG TABLETS 8.6 MG/1
TABLET ORAL
Refills: 0 | Status: ACTIVE | COMMUNITY

## 2019-02-08 ENCOUNTER — APPOINTMENT (OUTPATIENT)
Dept: INTERNAL MEDICINE | Facility: CLINIC | Age: 54
End: 2019-02-08
Payer: SELF-PAY

## 2019-02-08 ENCOUNTER — APPOINTMENT (OUTPATIENT)
Dept: CARDIOTHORACIC SURGERY | Facility: CLINIC | Age: 54
End: 2019-02-08
Payer: MEDICAID

## 2019-02-08 ENCOUNTER — OUTPATIENT (OUTPATIENT)
Dept: OUTPATIENT SERVICES | Facility: HOSPITAL | Age: 54
LOS: 1 days | End: 2019-02-08
Payer: SELF-PAY

## 2019-02-08 ENCOUNTER — RESULT CHARGE (OUTPATIENT)
Age: 54
End: 2019-02-08

## 2019-02-08 DIAGNOSIS — I10 ESSENTIAL (PRIMARY) HYPERTENSION: ICD-10-CM

## 2019-02-08 DIAGNOSIS — Z79.01 LONG TERM (CURRENT) USE OF ANTICOAGULANTS: ICD-10-CM

## 2019-02-08 LAB
INR PPP: 2.2 RATIO
QUALITY CONTROL: YES

## 2019-02-08 PROCEDURE — ZZZZZ: CPT

## 2019-02-08 PROCEDURE — 85610 PROTHROMBIN TIME: CPT

## 2019-02-11 ENCOUNTER — APPOINTMENT (OUTPATIENT)
Age: 54
End: 2019-02-11
Payer: SELF-PAY

## 2019-02-11 ENCOUNTER — APPOINTMENT (OUTPATIENT)
Dept: CARDIOTHORACIC SURGERY | Facility: CLINIC | Age: 54
End: 2019-02-11
Payer: MEDICAID

## 2019-02-11 DIAGNOSIS — Z98.890 OTHER SPECIFIED POSTPROCEDURAL STATES: ICD-10-CM

## 2019-02-11 DIAGNOSIS — Z86.79 PERSONAL HISTORY OF OTHER DISEASES OF THE CIRCULATORY SYSTEM: ICD-10-CM

## 2019-02-11 DIAGNOSIS — Z95.2 PRESENCE OF PROSTHETIC HEART VALVE: ICD-10-CM

## 2019-02-11 PROCEDURE — 99024 POSTOP FOLLOW-UP VISIT: CPT

## 2019-02-12 ENCOUNTER — APPOINTMENT (OUTPATIENT)
Dept: CARDIOTHORACIC SURGERY | Facility: CLINIC | Age: 54
End: 2019-02-12
Payer: MEDICAID

## 2019-02-12 VITALS
DIASTOLIC BLOOD PRESSURE: 100 MMHG | OXYGEN SATURATION: 99 % | HEART RATE: 81 BPM | RESPIRATION RATE: 14 BRPM | SYSTOLIC BLOOD PRESSURE: 162 MMHG | TEMPERATURE: 98.5 F

## 2019-02-12 VITALS
WEIGHT: 170 LBS | BODY MASS INDEX: 29.02 KG/M2 | SYSTOLIC BLOOD PRESSURE: 159 MMHG | HEIGHT: 64 IN | DIASTOLIC BLOOD PRESSURE: 105 MMHG

## 2019-02-12 VITALS — OXYGEN SATURATION: 97 % | SYSTOLIC BLOOD PRESSURE: 124 MMHG | DIASTOLIC BLOOD PRESSURE: 80 MMHG

## 2019-02-12 VITALS — DIASTOLIC BLOOD PRESSURE: 94 MMHG | SYSTOLIC BLOOD PRESSURE: 151 MMHG

## 2019-02-12 DIAGNOSIS — I48.2 CHRONIC ATRIAL FIBRILLATION: ICD-10-CM

## 2019-02-12 DIAGNOSIS — Z87.74 PERSONAL HISTORY OF (CORRECTED) CONGENITAL MALFORMATIONS OF HEART AND CIRCULATORY SYSTEM: ICD-10-CM

## 2019-02-12 DIAGNOSIS — I10 ESSENTIAL (PRIMARY) HYPERTENSION: ICD-10-CM

## 2019-02-12 DIAGNOSIS — E78.5 HYPERLIPIDEMIA, UNSPECIFIED: ICD-10-CM

## 2019-02-12 DIAGNOSIS — Z95.0 PRESENCE OF CARDIAC PACEMAKER: ICD-10-CM

## 2019-02-12 PROBLEM — Z95.2 S/P MVR (MITRAL VALVE REPLACEMENT): Status: ACTIVE | Noted: 2019-02-07

## 2019-02-12 PROBLEM — Z98.890 S/P TVR (TRICUSPID VALVE REPAIR): Status: ACTIVE | Noted: 2019-02-07

## 2019-02-12 PROBLEM — Z86.79 HISTORY OF ATRIAL FIBRILLATION: Status: RESOLVED | Noted: 2019-02-12 | Resolved: 2019-02-12

## 2019-02-12 PROBLEM — Z86.79 HISTORY OF HYPERTENSION: Status: RESOLVED | Noted: 2019-02-12 | Resolved: 2019-02-12

## 2019-02-12 PROCEDURE — 99024 POSTOP FOLLOW-UP VISIT: CPT

## 2019-02-12 RX ORDER — DOCUSATE SODIUM 100 MG/1
100 CAPSULE ORAL TWICE DAILY
Qty: 30 | Refills: 0 | Status: ACTIVE | COMMUNITY

## 2019-02-12 RX ORDER — PANTOPRAZOLE 40 MG/1
40 TABLET, DELAYED RELEASE ORAL
Qty: 30 | Refills: 0 | Status: ACTIVE | COMMUNITY

## 2019-02-12 RX ORDER — ASPIRIN 81 MG
81 TABLET, DELAYED RELEASE (ENTERIC COATED) ORAL DAILY
Refills: 0 | Status: ACTIVE | COMMUNITY

## 2019-02-12 NOTE — HISTORY OF PRESENT ILLNESS
[FreeTextEntry1] : FOLLOW YOUR HEART HOME VISIT-[facility]\par Home Visit made Mo FIGUEREDO ] .   patient of Dr RAMÍREZ   S/P MVR/TVR   on 1/25. Discharged on 1/30[]  from [facility] \par \par Arrived to patient home. She is accompanied by her .  He states she has been doing welll, she has been ambulating\par Denies SOB, dizzy spells, palpitations\par Had her INR drawn this past Friday and was told it was within the desired range.\par The  is trying to follow up with a cardilologist.\par the  also wanted to know when it is safe to travels back to [facility].\par \par \par  \par \par

## 2019-02-12 NOTE — PROCEDURE
[FreeTextEntry1] : Post operative teaching reinforced, daily weights, showering daily, no heavy lifting greater than 5 pounds, no driving until seen by surgeon.  taking temperature daily.  diabetes glucose control.  Medication adherence.incentive spirometry 10 x every 1 hour.

## 2019-02-12 NOTE — PHYSICAL EXAM
[PERRL With Normal Accommodation] : pupils were equal in size, round, and reactive to light [Neck Appearance] : the appearance of the neck was normal [Neck Cervical Mass (___cm)] : no neck mass was observed [] : no respiratory distress [Respiration, Rhythm And Depth] : normal respiratory rhythm and effort [Apical Impulse] : the apical impulse was normal [Heart Sounds] : normal S1 and S2 [Examination Of The Chest] : the chest was normal in appearance [Breast Appearance] : normal in appearance [Bowel Sounds] : normal bowel sounds [No CVA Tenderness] : no ~M costovertebral angle tenderness [Abnormal Walk] : normal gait [Skin Color & Pigmentation] : normal skin color and pigmentation [Skin Turgor] : normal skin turgor [FreeTextEntry1] : mti- approximated [Cranial Nerves] : cranial nerves 2-12 were intact [Oriented To Time, Place, And Person] : oriented to person, place, and time

## 2019-02-12 NOTE — ASSESSMENT
[FreeTextEntry1] : A/P:Ms. FIGUEREDO is a  53 [place] female with Afib, HTN . S/PMVR/TVR on 1/25. S/P micra PPM. on coumadin\par \par \par \par \par \par

## 2019-02-12 NOTE — REASON FOR VISIT
[Post Hospitalization] : a post hospitalization visit [Spouse] : spouse [FreeTextEntry1] : Visiting patient for post discharge transitional care management and post surgical follow up.

## 2019-02-14 DIAGNOSIS — I48.2 CHRONIC ATRIAL FIBRILLATION: ICD-10-CM

## 2019-02-15 ENCOUNTER — APPOINTMENT (OUTPATIENT)
Dept: INTERNAL MEDICINE | Facility: CLINIC | Age: 54
End: 2019-02-15

## 2019-02-15 LAB
INR PPP: 2 RATIO
QUALITY CONTROL: YES

## 2019-02-20 ENCOUNTER — APPOINTMENT (OUTPATIENT)
Dept: INTERNAL MEDICINE | Facility: CLINIC | Age: 54
End: 2019-02-20

## 2019-02-20 ENCOUNTER — OUTPATIENT (OUTPATIENT)
Dept: OUTPATIENT SERVICES | Facility: HOSPITAL | Age: 54
LOS: 1 days | End: 2019-02-20
Payer: SELF-PAY

## 2019-02-20 LAB
INR PPP: 1.7 RATIO
POCT-PROTHROMBIN TIME: 20.3 SECS
QUALITY CONTROL: YES

## 2019-02-20 PROCEDURE — 85610 PROTHROMBIN TIME: CPT

## 2019-02-21 DIAGNOSIS — I48.2 CHRONIC ATRIAL FIBRILLATION: ICD-10-CM

## 2019-02-21 DIAGNOSIS — Z79.01 LONG TERM (CURRENT) USE OF ANTICOAGULANTS: ICD-10-CM

## 2019-02-25 ENCOUNTER — MEDICATION RENEWAL (OUTPATIENT)
Age: 54
End: 2019-02-25

## 2019-02-25 ENCOUNTER — CLINICAL ADVICE (OUTPATIENT)
Age: 54
End: 2019-02-25

## 2019-02-26 ENCOUNTER — RESULT CHARGE (OUTPATIENT)
Age: 54
End: 2019-02-26

## 2019-02-27 ENCOUNTER — APPOINTMENT (OUTPATIENT)
Dept: INTERNAL MEDICINE | Facility: CLINIC | Age: 54
End: 2019-02-27

## 2019-02-27 LAB
INR PPP: 3.6 RATIO
POCT-PROTHROMBIN TIME: 43.3 SECS
QUALITY CONTROL: YES

## 2019-02-27 RX ORDER — WARFARIN 5 MG/1
5 TABLET ORAL DAILY
Qty: 90 | Refills: 0 | Status: ACTIVE | COMMUNITY
Start: 1900-01-01 | End: 1900-01-01

## 2019-03-04 ENCOUNTER — OUTPATIENT (OUTPATIENT)
Dept: OUTPATIENT SERVICES | Facility: HOSPITAL | Age: 54
LOS: 1 days | End: 2019-03-04
Payer: SELF-PAY

## 2019-03-04 ENCOUNTER — RX RENEWAL (OUTPATIENT)
Age: 54
End: 2019-03-04

## 2019-03-04 ENCOUNTER — APPOINTMENT (OUTPATIENT)
Dept: INTERNAL MEDICINE | Facility: CLINIC | Age: 54
End: 2019-03-04

## 2019-03-04 DIAGNOSIS — I48.2 CHRONIC ATRIAL FIBRILLATION: ICD-10-CM

## 2019-03-04 DIAGNOSIS — Z79.01 LONG TERM (CURRENT) USE OF ANTICOAGULANTS: ICD-10-CM

## 2019-03-04 LAB
INR PPP: 2.7 RATIO
POCT-PROTHROMBIN TIME: 32.6 SECS
QUALITY CONTROL: YES

## 2019-03-04 PROCEDURE — 85610 PROTHROMBIN TIME: CPT

## 2019-03-04 RX ORDER — ATORVASTATIN CALCIUM 40 MG/1
40 TABLET, FILM COATED ORAL
Qty: 90 | Refills: 1 | Status: ACTIVE | COMMUNITY
Start: 1900-01-01 | End: 1900-01-01

## 2019-03-04 RX ORDER — ASPIRIN 81 MG/1
81 TABLET ORAL DAILY
Qty: 90 | Refills: 1 | Status: ACTIVE | COMMUNITY
Start: 2019-03-04 | End: 1900-01-01

## 2019-03-04 RX ORDER — LOSARTAN POTASSIUM 25 MG/1
25 TABLET, FILM COATED ORAL TWICE DAILY
Qty: 180 | Refills: 0 | Status: ACTIVE | COMMUNITY
Start: 1900-01-01 | End: 1900-01-01

## 2019-03-04 RX ORDER — FUROSEMIDE 20 MG/1
20 TABLET ORAL DAILY
Qty: 90 | Refills: 1 | Status: ACTIVE | COMMUNITY
Start: 1900-01-01 | End: 1900-01-01

## 2019-03-04 RX ORDER — METOPROLOL TARTRATE 50 MG/1
50 TABLET, FILM COATED ORAL
Qty: 180 | Refills: 1 | Status: ACTIVE | COMMUNITY
Start: 1900-01-01 | End: 1900-01-01

## 2019-03-06 ENCOUNTER — APPOINTMENT (OUTPATIENT)
Dept: INTERNAL MEDICINE | Facility: CLINIC | Age: 54
End: 2019-03-06

## 2019-04-01 ENCOUNTER — APPOINTMENT (OUTPATIENT)
Dept: OTHER | Facility: CLINIC | Age: 54
End: 2019-04-01

## 2019-11-27 ENCOUNTER — INBOUND DOCUMENT (OUTPATIENT)
Age: 54
End: 2019-11-27

## 2021-01-27 NOTE — PHYSICAL THERAPY INITIAL EVALUATION ADULT - DISCHARGE DISPOSITION, PT EVAL
Chief Complaint   Patient presents with     Follow Up     EMG follow up      Miguel Rudolph CMA on 1/27/2021 at 8:13 AM     no skilled PT needs/home

## 2023-11-28 NOTE — DISCHARGE NOTE ADULT - HOME CARE AGENCY
[facility] [place] ([phone])- a nurse will contact you the day after discharge to set up appointment for nursing evaluation and social work evaluation. (3) no apparent problem

## 2024-02-15 NOTE — PROGRESS NOTE ADULT - PROBLEM SELECTOR PROBLEM 1
Continue to follow with orthopedics.  Home health care PT.  
Mitral valve insufficiency, unspecified etiology
S/P MVR (mitral valve replacement)
S/P mitral valve replacement
Sinus pause
Sinus pause

## 2025-03-15 NOTE — ED PROVIDER NOTE - CARDIAC, MLM
Normal rate, regular rhythm.  Heart sounds S1, S2.  No murmurs, rubs or gallops. 2+ pulses in distal extremities b/l Previously Negative (within the last year)